# Patient Record
Sex: FEMALE | Race: WHITE | Employment: FULL TIME | URBAN - METROPOLITAN AREA
[De-identification: names, ages, dates, MRNs, and addresses within clinical notes are randomized per-mention and may not be internally consistent; named-entity substitution may affect disease eponyms.]

---

## 2017-01-31 RX ORDER — METFORMIN HYDROCHLORIDE 500 MG/1
1000 TABLET, EXTENDED RELEASE ORAL 2 TIMES DAILY WITH MEALS
Qty: 360 TABLET | Refills: 1 | Status: SHIPPED | OUTPATIENT
Start: 2017-01-31 | End: 2017-05-10

## 2017-02-03 ENCOUNTER — TELEPHONE (OUTPATIENT)
Dept: FAMILY MEDICINE CLINIC | Facility: CLINIC | Age: 57
End: 2017-02-03

## 2017-02-03 ENCOUNTER — LAB ENCOUNTER (OUTPATIENT)
Dept: LAB | Age: 57
End: 2017-02-03
Attending: INTERNAL MEDICINE
Payer: COMMERCIAL

## 2017-02-03 DIAGNOSIS — G47.30 SLEEP APNEA IN ADULT: Primary | ICD-10-CM

## 2017-02-03 DIAGNOSIS — G25.81 RESTLESS LEGS SYNDROME (RLS): ICD-10-CM

## 2017-02-03 DIAGNOSIS — G47.61 PERIODIC LIMB MOVEMENT DISORDER: Primary | ICD-10-CM

## 2017-02-03 LAB — DEPRECATED HBV CORE AB SER IA-ACNC: 10.2 NG/ML (ref 10–291)

## 2017-02-03 PROCEDURE — 82728 ASSAY OF FERRITIN: CPT

## 2017-02-03 PROCEDURE — 36415 COLL VENOUS BLD VENIPUNCTURE: CPT

## 2017-02-06 ENCOUNTER — MED REC SCAN ONLY (OUTPATIENT)
Dept: FAMILY MEDICINE CLINIC | Facility: CLINIC | Age: 57
End: 2017-02-06

## 2017-04-03 ENCOUNTER — TELEPHONE (OUTPATIENT)
Dept: FAMILY MEDICINE CLINIC | Facility: CLINIC | Age: 57
End: 2017-04-03

## 2017-04-03 DIAGNOSIS — Z00.00 ROUTINE HEALTH MAINTENANCE: Primary | ICD-10-CM

## 2017-04-03 DIAGNOSIS — E11.9 TYPE 2 DIABETES MELLITUS WITHOUT COMPLICATION, WITHOUT LONG-TERM CURRENT USE OF INSULIN (HCC): ICD-10-CM

## 2017-04-04 ENCOUNTER — TELEPHONE (OUTPATIENT)
Dept: FAMILY MEDICINE CLINIC | Facility: CLINIC | Age: 57
End: 2017-04-04

## 2017-04-10 ENCOUNTER — HOSPITAL ENCOUNTER (OUTPATIENT)
Dept: MAMMOGRAPHY | Age: 57
Discharge: HOME | End: 2017-04-10
Attending: FAMILY MEDICINE
Payer: COMMERCIAL

## 2017-04-10 DIAGNOSIS — Z00.00 ROUTINE HEALTH MAINTENANCE: ICD-10-CM

## 2017-04-10 PROCEDURE — 77067 SCR MAMMO BI INCL CAD: CPT

## 2017-04-14 ENCOUNTER — TELEPHONE (OUTPATIENT)
Dept: FAMILY MEDICINE CLINIC | Facility: CLINIC | Age: 57
End: 2017-04-14

## 2017-04-21 ENCOUNTER — TELEPHONE (OUTPATIENT)
Dept: FAMILY MEDICINE CLINIC | Facility: CLINIC | Age: 57
End: 2017-04-21

## 2017-04-24 ENCOUNTER — HOSPITAL ENCOUNTER (OUTPATIENT)
Dept: MAMMOGRAPHY | Age: 57
Discharge: HOME | End: 2017-04-24
Attending: FAMILY MEDICINE
Payer: COMMERCIAL

## 2017-04-24 DIAGNOSIS — R92.2 INCONCLUSIVE MAMMOGRAM: ICD-10-CM

## 2017-04-24 PROCEDURE — 77061 BREAST TOMOSYNTHESIS UNI: CPT

## 2017-04-24 PROCEDURE — 77065 DX MAMMO INCL CAD UNI: CPT

## 2017-04-25 ENCOUNTER — TELEPHONE (OUTPATIENT)
Dept: FAMILY MEDICINE CLINIC | Facility: CLINIC | Age: 57
End: 2017-04-25

## 2017-04-25 DIAGNOSIS — G25.81 RESTLESS LEG SYNDROME: ICD-10-CM

## 2017-04-25 DIAGNOSIS — G47.30 SLEEP APNEA IN ADULT: Primary | ICD-10-CM

## 2017-04-25 NOTE — TELEPHONE ENCOUNTER
Patient calls back, confirmed appointment for Saturday, states she needs a blood test for , sleep doctor. According to her note she wants a ferritin level, order placed.  V.O. Dr. Louise Carnes

## 2017-04-29 ENCOUNTER — NURSE ONLY (OUTPATIENT)
Dept: FAMILY MEDICINE CLINIC | Facility: CLINIC | Age: 57
End: 2017-04-29

## 2017-04-29 DIAGNOSIS — G25.81 RESTLESS LEG SYNDROME: ICD-10-CM

## 2017-04-29 DIAGNOSIS — E11.9 TYPE 2 DIABETES MELLITUS WITHOUT COMPLICATION, WITHOUT LONG-TERM CURRENT USE OF INSULIN (HCC): ICD-10-CM

## 2017-04-29 DIAGNOSIS — E78.00 PURE HYPERCHOLESTEROLEMIA: ICD-10-CM

## 2017-04-29 DIAGNOSIS — G47.30 SLEEP APNEA IN ADULT: ICD-10-CM

## 2017-04-29 PROCEDURE — 82728 ASSAY OF FERRITIN: CPT | Performed by: FAMILY MEDICINE

## 2017-04-29 PROCEDURE — 80061 LIPID PANEL: CPT | Performed by: FAMILY MEDICINE

## 2017-04-29 PROCEDURE — 36415 COLL VENOUS BLD VENIPUNCTURE: CPT | Performed by: FAMILY MEDICINE

## 2017-04-29 PROCEDURE — 82043 UR ALBUMIN QUANTITATIVE: CPT | Performed by: FAMILY MEDICINE

## 2017-04-29 PROCEDURE — 82570 ASSAY OF URINE CREATININE: CPT | Performed by: FAMILY MEDICINE

## 2017-04-29 PROCEDURE — 83036 HEMOGLOBIN GLYCOSYLATED A1C: CPT | Performed by: FAMILY MEDICINE

## 2017-05-01 ENCOUNTER — TELEPHONE (OUTPATIENT)
Dept: FAMILY MEDICINE CLINIC | Facility: CLINIC | Age: 57
End: 2017-05-01

## 2017-05-01 DIAGNOSIS — E11.9 TYPE 2 DIABETES MELLITUS WITHOUT COMPLICATION, WITHOUT LONG-TERM CURRENT USE OF INSULIN (HCC): Primary | ICD-10-CM

## 2017-05-01 NOTE — TELEPHONE ENCOUNTER
----- Message from Tin Alva DO sent at 4/30/2017  8:21 AM CDT -----  Can notify gifty, her BS control lipids all look good her kidneys are good as well .  Let's recall A1c in 6 months

## 2017-05-10 RX ORDER — METFORMIN HYDROCHLORIDE 500 MG/1
1000 TABLET, EXTENDED RELEASE ORAL 2 TIMES DAILY WITH MEALS
Qty: 360 TABLET | Refills: 1 | Status: SHIPPED | OUTPATIENT
Start: 2017-05-10 | End: 2017-10-22

## 2017-05-10 RX ORDER — LISINOPRIL 10 MG/1
10 TABLET ORAL
Qty: 90 TABLET | Refills: 1 | Status: SHIPPED | OUTPATIENT
Start: 2017-05-10 | End: 2017-10-22

## 2017-05-10 NOTE — TELEPHONE ENCOUNTER
Last OV 9/29/16, No future appointments.     Last rx lisinopril given 8/14/16  Last rx rosiglitazone given 8/31/16  Last rx metformin given 1/31/17

## 2017-08-02 ENCOUNTER — OFFICE VISIT (OUTPATIENT)
Dept: FAMILY MEDICINE CLINIC | Facility: CLINIC | Age: 57
End: 2017-08-02

## 2017-08-02 VITALS
WEIGHT: 151 LBS | SYSTOLIC BLOOD PRESSURE: 124 MMHG | DIASTOLIC BLOOD PRESSURE: 80 MMHG | TEMPERATURE: 98 F | HEART RATE: 68 BPM | RESPIRATION RATE: 16 BRPM | BODY MASS INDEX: 29 KG/M2

## 2017-08-02 DIAGNOSIS — E61.1 IRON DEFICIENCY: ICD-10-CM

## 2017-08-02 DIAGNOSIS — E11.9 TYPE 2 DIABETES MELLITUS WITHOUT COMPLICATION, WITHOUT LONG-TERM CURRENT USE OF INSULIN (HCC): ICD-10-CM

## 2017-08-02 DIAGNOSIS — R16.1 SPLENOMEGALY: ICD-10-CM

## 2017-08-02 DIAGNOSIS — R23.3 PETECHIAE: Primary | ICD-10-CM

## 2017-08-02 DIAGNOSIS — R63.5 WEIGHT GAIN: ICD-10-CM

## 2017-08-02 LAB
BASOPHILS # BLD AUTO: 0.04 X10(3) UL (ref 0–0.1)
BASOPHILS NFR BLD AUTO: 0.8 %
DEPRECATED HBV CORE AB SER IA-ACNC: 16.5 NG/ML (ref 10–291)
EOSINOPHIL # BLD AUTO: 0.37 X10(3) UL (ref 0–0.3)
EOSINOPHIL NFR BLD AUTO: 7 %
ERYTHROCYTE [DISTWIDTH] IN BLOOD BY AUTOMATED COUNT: 14 % (ref 11.5–16)
HCT VFR BLD AUTO: 32.4 % (ref 34–50)
HGB BLD-MCNC: 10.4 G/DL (ref 12–16)
IMMATURE GRANULOCYTE COUNT: 0.01 X10(3) UL (ref 0–1)
IMMATURE GRANULOCYTE RATIO %: 0.2 %
IRON SATURATION: 10 % (ref 13–45)
IRON: 42 UG/DL (ref 28–170)
LYMPHOCYTES # BLD AUTO: 1.84 X10(3) UL (ref 0.9–4)
LYMPHOCYTES NFR BLD AUTO: 34.9 %
MCH RBC QN AUTO: 30.9 PG (ref 27–33.2)
MCHC RBC AUTO-ENTMCNC: 32.1 G/DL (ref 31–37)
MCV RBC AUTO: 96.1 FL (ref 81–100)
MONOCYTES # BLD AUTO: 0.56 X10(3) UL (ref 0.1–0.6)
MONOCYTES NFR BLD AUTO: 10.6 %
NEUTROPHIL ABS PRELIM: 2.45 X10 (3) UL (ref 1.3–6.7)
NEUTROPHILS # BLD AUTO: 2.45 X10(3) UL (ref 1.3–6.7)
NEUTROPHILS NFR BLD AUTO: 46.5 %
PLATELET # BLD AUTO: 347 10(3)UL (ref 150–450)
RBC # BLD AUTO: 3.37 X10(6)UL (ref 3.8–5.1)
RED CELL DISTRIBUTION WIDTH-SD: 49 FL (ref 35.1–46.3)
TOTAL IRON BINDING CAPACITY: 425 UG/DL (ref 298–536)
TRANSFERRIN: 285 MG/DL (ref 200–360)
WBC # BLD AUTO: 5.3 X10(3) UL (ref 4–13)

## 2017-08-02 PROCEDURE — 83540 ASSAY OF IRON: CPT | Performed by: FAMILY MEDICINE

## 2017-08-02 PROCEDURE — 85025 COMPLETE CBC W/AUTO DIFF WBC: CPT | Performed by: FAMILY MEDICINE

## 2017-08-02 PROCEDURE — 99214 OFFICE O/P EST MOD 30 MIN: CPT | Performed by: FAMILY MEDICINE

## 2017-08-02 PROCEDURE — 82728 ASSAY OF FERRITIN: CPT | Performed by: FAMILY MEDICINE

## 2017-08-02 PROCEDURE — 83550 IRON BINDING TEST: CPT | Performed by: FAMILY MEDICINE

## 2017-08-02 PROCEDURE — 83036 HEMOGLOBIN GLYCOSYLATED A1C: CPT | Performed by: FAMILY MEDICINE

## 2017-08-03 ENCOUNTER — TELEPHONE (OUTPATIENT)
Dept: FAMILY MEDICINE CLINIC | Facility: CLINIC | Age: 57
End: 2017-08-03

## 2017-08-03 DIAGNOSIS — D50.9 IRON DEFICIENCY ANEMIA, UNSPECIFIED IRON DEFICIENCY ANEMIA TYPE: Primary | ICD-10-CM

## 2017-08-03 LAB
EST. AVERAGE GLUCOSE BLD GHB EST-MCNC: 146 MG/DL (ref 68–126)
HBA1C MFR BLD HPLC: 6.7 % (ref ?–5.7)

## 2017-08-03 RX ORDER — FERROUS SULFATE 325(65) MG
TABLET ORAL
Refills: 3 | COMMUNITY
Start: 2017-05-10 | End: 2017-08-26

## 2017-08-03 NOTE — PROGRESS NOTES
David Carrero is a 59year old female. HPI:   Jona Keenan presents today for a number of issues, first she noted new onset of several small red spots no her arms and legs, they are at times bigger than other. They are not painful nor do they itch.  She has Past Medical History:   Diagnosis Date   • DM type 2 (diabetes mellitus, type 2) [facility]    • Esophageal reflux    • Hyperlipidemia    • Hypertension    • Insomnia    • Varicose veins       Social History:  Smoking status: Never Smoker NUTRITION MANAGEMENT 3 HRS  US ABDOMEN COMPLETE (CPT=76700)     The patient indicates understanding of these issues and agrees to the plan. The patient is asked to return in after we get her lab results .   Not convinced these are petechiae, she needs some

## 2017-08-03 NOTE — TELEPHONE ENCOUNTER
Detailed message left for pt on cell (ok per consent) with Dr. Willa Damon an instructions to call with questions/concerns.

## 2017-08-03 NOTE — TELEPHONE ENCOUNTER
----- Message from Bismark Bond DO sent at 8/3/2017  6:54 AM CDT -----  Can notify Nora Acuña, her iron stores have actually improved, but she is still anemic.  I think I'd like her to see a hematologist, Dr. Sita Desir at [facility]  I will place the referral and see if

## 2017-08-08 ENCOUNTER — HOSPITAL ENCOUNTER (OUTPATIENT)
Dept: ULTRASOUND IMAGING | Age: 57
Discharge: HOME | End: 2017-08-08
Attending: FAMILY MEDICINE
Payer: COMMERCIAL

## 2017-08-08 DIAGNOSIS — R23.3 PETECHIAE: ICD-10-CM

## 2017-08-08 PROCEDURE — 76700 US EXAM ABDOM COMPLETE: CPT | Performed by: FAMILY MEDICINE

## 2017-08-11 ENCOUNTER — TELEPHONE (OUTPATIENT)
Dept: FAMILY MEDICINE CLINIC | Facility: CLINIC | Age: 57
End: 2017-08-11

## 2017-08-11 DIAGNOSIS — R10.11 RIGHT UPPER QUADRANT ABDOMINAL PAIN: Primary | ICD-10-CM

## 2017-08-11 DIAGNOSIS — R11.0 NAUSEA: ICD-10-CM

## 2017-08-11 DIAGNOSIS — K83.8 COMMON BILE DUCT DILATION: ICD-10-CM

## 2017-08-18 ENCOUNTER — OFFICE VISIT (OUTPATIENT)
Dept: HEMATOLOGY/ONCOLOGY | Age: 57
End: 2017-08-18
Attending: INTERNAL MEDICINE
Payer: COMMERCIAL

## 2017-08-18 ENCOUNTER — TELEPHONE (OUTPATIENT)
Dept: FAMILY MEDICINE CLINIC | Facility: CLINIC | Age: 57
End: 2017-08-18

## 2017-08-18 VITALS
RESPIRATION RATE: 18 BRPM | SYSTOLIC BLOOD PRESSURE: 121 MMHG | DIASTOLIC BLOOD PRESSURE: 77 MMHG | WEIGHT: 149.63 LBS | OXYGEN SATURATION: 99 % | HEART RATE: 76 BPM | BODY MASS INDEX: 29 KG/M2 | TEMPERATURE: 96 F

## 2017-08-18 DIAGNOSIS — D50.9 IRON DEFICIENCY ANEMIA, UNSPECIFIED IRON DEFICIENCY ANEMIA TYPE: Primary | ICD-10-CM

## 2017-08-18 DIAGNOSIS — E61.1 IRON DEFICIENCY: Primary | ICD-10-CM

## 2017-08-18 DIAGNOSIS — D50.9 IRON DEFICIENCY ANEMIA, UNSPECIFIED IRON DEFICIENCY ANEMIA TYPE: ICD-10-CM

## 2017-08-18 DIAGNOSIS — D64.9 NORMOCYTIC ANEMIA: ICD-10-CM

## 2017-08-18 PROCEDURE — 82607 VITAMIN B-12: CPT | Performed by: INTERNAL MEDICINE

## 2017-08-18 PROCEDURE — 85025 COMPLETE CBC W/AUTO DIFF WBC: CPT | Performed by: INTERNAL MEDICINE

## 2017-08-18 PROCEDURE — 99211 OFF/OP EST MAY X REQ PHY/QHP: CPT

## 2017-08-18 PROCEDURE — 36415 COLL VENOUS BLD VENIPUNCTURE: CPT

## 2017-08-18 PROCEDURE — 82746 ASSAY OF FOLIC ACID SERUM: CPT | Performed by: INTERNAL MEDICINE

## 2017-08-18 PROCEDURE — 82728 ASSAY OF FERRITIN: CPT | Performed by: INTERNAL MEDICINE

## 2017-08-18 PROCEDURE — 83615 LACTATE (LD) (LDH) ENZYME: CPT | Performed by: INTERNAL MEDICINE

## 2017-08-18 RX ORDER — ASCORBIC ACID 500 MG
500 TABLET ORAL DAILY
COMMUNITY

## 2017-08-18 NOTE — TELEPHONE ENCOUNTER
Spouse called, needs a referral for pt's 11/17 appt with Dr. Emerita Valentino.   Please call spouse at [phone]

## 2017-08-24 ENCOUNTER — DIABETIC EDUCATION (OUTPATIENT)
Dept: ENDOCRINOLOGY CLINIC | Facility: CLINIC | Age: 57
End: 2017-08-24

## 2017-08-24 DIAGNOSIS — E11.9 TYPE 2 DIABETES MELLITUS WITHOUT COMPLICATION, WITHOUT LONG-TERM CURRENT USE OF INSULIN (HCC): Primary | ICD-10-CM

## 2017-08-24 PROCEDURE — 97802 MEDICAL NUTRITION INDIV IN: CPT | Performed by: DIETITIAN, REGISTERED

## 2017-08-24 NOTE — PROGRESS NOTES
Start time: 2:50 End time: 4:05    Pt is eating healthy; low starch needs to add protein at breakfast. Not a good meat eater, anemic. Eats plenty of veg and some fruit. Had DB educ ~10 years ago at  at [facility].  Reviewed balanced plate and carb counting/fo

## 2017-08-26 RX ORDER — FERROUS SULFATE 325(65) MG
325 TABLET ORAL 2 TIMES DAILY
Qty: 180 TABLET | Refills: 1 | Status: SHIPPED | OUTPATIENT
Start: 2017-08-26 | End: 2018-05-14

## 2017-08-26 NOTE — TELEPHONE ENCOUNTER
Last OV 8/2/17, Future Appointments  Date Time Provider Pam Littlejohn   9/21/2017 3:50 PM MD Mony Farfan   11/17/2017 3:45 PM Osmany Gatica MD  [facility] [place]       Last rx given 5/10/17

## 2017-08-26 NOTE — TELEPHONE ENCOUNTER
Spouse called, pt needs a refill on Ferrous Sulfate 325 (65 Fe) MG Oral Tab. [facility], [facility]. Pt is suppose to take 2 by mouth everyday. This was prescribed by Dr. Betty Monroe. Please call spouse at [phone].   Dr. Roxine Cushing changed the dose from 1

## 2017-09-18 RX ORDER — OMEPRAZOLE 20 MG/1
CAPSULE, DELAYED RELEASE ORAL
Qty: 180 CAPSULE | Refills: 3 | Status: SHIPPED | OUTPATIENT
Start: 2017-09-18 | End: 2018-09-15

## 2017-09-18 NOTE — TELEPHONE ENCOUNTER
omeprazole 20 MG Oral Capsule Delayed Release 180 capsule 2 11/16/2016     Sig: TAKE 1 CAPSULE TWICE DAILY      PLEASE SEND [facility]RU [facility] MAIL ORDER.

## 2017-09-18 NOTE — TELEPHONE ENCOUNTER
Last OV 8/2/17, Future Appointments  Date Time Provider Pam Littlejohn   9/21/2017 3:50 PM MD Min Maldonado   11/17/2017 3:45 PM Jason Garner MD  [facility] [place]       Last rx given 11/16/16

## 2017-10-03 ENCOUNTER — TELEPHONE (OUTPATIENT)
Dept: FAMILY MEDICINE CLINIC | Facility: CLINIC | Age: 57
End: 2017-10-03

## 2017-10-03 DIAGNOSIS — E61.1 IRON DEFICIENCY: Primary | ICD-10-CM

## 2017-10-03 DIAGNOSIS — R10.12 LUQ PAIN: ICD-10-CM

## 2017-10-03 NOTE — TELEPHONE ENCOUNTER
Pt is scheduled to have EGD and colonoscopy with Dr. Damián Gallagher. Pt will need referral for follow up after testing.

## 2017-10-21 NOTE — TELEPHONE ENCOUNTER
Last OV 8/2/17, Future Appointments  Date Time Provider Pam Annetta   10/24/2017 11:00 AM Wesley Figueredo MD [facility]   11/17/2017 3:45 PM Kalie Hyde MD [facility] [place]       Last rx rosiglitazone given 5/10/17  Last rx lisinopr

## 2017-10-21 NOTE — TELEPHONE ENCOUNTER
SPOUSE CALLED AND PT NEEDS ALL 3 MEDS REFILLED THROUGH [facility]      Rosiglitazone Maleate 4 MG Oral Tab    lisinopril 10 MG Oral Tab    MetFORMIN HCl  MG Oral Tablet 24 Hr    PLEASE SEND SCRIPTS TO [facility]    THANK YOU

## 2017-10-22 RX ORDER — METFORMIN HYDROCHLORIDE 500 MG/1
1000 TABLET, EXTENDED RELEASE ORAL 2 TIMES DAILY WITH MEALS
Qty: 360 TABLET | Refills: 3 | Status: SHIPPED | OUTPATIENT
Start: 2017-10-22 | End: 2018-10-08

## 2017-10-22 RX ORDER — LISINOPRIL 10 MG/1
10 TABLET ORAL
Qty: 90 TABLET | Refills: 3 | Status: SHIPPED | OUTPATIENT
Start: 2017-10-22 | End: 2018-09-17

## 2017-10-23 ENCOUNTER — TELEPHONE (OUTPATIENT)
Dept: FAMILY MEDICINE CLINIC | Facility: CLINIC | Age: 57
End: 2017-10-23

## 2017-10-23 NOTE — TELEPHONE ENCOUNTER
Last OV 8/2/17, Future Appointments  Date Time Provider Pam Annetta   10/24/2017 11:00 AM [facility]   11/17/2017 3:45 PM Osmany Gatica MD [facility] [place]       Last rx given 3/29/16

## 2017-10-23 NOTE — TELEPHONE ENCOUNTER
Allergies on pt's file reviewed with pt. Pt states she does not have an allergy to statins (she has never been prescribed a statin in the past); allergy removed from chart. Pt states she had a bad reaction to Amaryl in the past. Allergy added.   Pt will alcides

## 2017-10-24 PROCEDURE — 88305 TISSUE EXAM BY PATHOLOGIST: CPT | Performed by: INTERNAL MEDICINE

## 2017-10-25 ENCOUNTER — TELEPHONE (OUTPATIENT)
Dept: FAMILY MEDICINE CLINIC | Facility: CLINIC | Age: 57
End: 2017-10-25

## 2017-10-25 NOTE — TELEPHONE ENCOUNTER
Noted    Future Appointments  Date Time Provider Pam Littlejohn   11/10/2017 9:00 AM EM[facility] CH[facility] NURSE [facility] EMG Jorge Collado   11/17/2017 3:45 PM Steven Paulson MD [facility] [place]

## 2017-10-30 PROBLEM — B96.81 HELICOBACTER POSITIVE GASTRITIS: Status: ACTIVE | Noted: 2017-10-30

## 2017-10-30 PROBLEM — K29.70 HELICOBACTER POSITIVE GASTRITIS: Status: ACTIVE | Noted: 2017-10-30

## 2017-11-17 ENCOUNTER — APPOINTMENT (OUTPATIENT)
Dept: HEMATOLOGY/ONCOLOGY | Age: 57
End: 2017-11-17
Attending: INTERNAL MEDICINE
Payer: COMMERCIAL

## 2017-11-20 ENCOUNTER — TELEPHONE (OUTPATIENT)
Dept: FAMILY MEDICINE CLINIC | Facility: CLINIC | Age: 57
End: 2017-11-20

## 2017-11-20 RX ORDER — FLUCONAZOLE 100 MG/1
100 TABLET ORAL DAILY
Qty: 5 TABLET | Refills: 0 | Status: SHIPPED | OUTPATIENT
Start: 2017-11-20 | End: 2017-11-25

## 2017-11-20 NOTE — TELEPHONE ENCOUNTER
Pt called, recently on an antibiotic, now thinks she is getting a yeast infection. Leaving for out of town tomorrow. Is there anything we can call in for her today? [place].    Please call pt at [phone]

## 2018-01-19 ENCOUNTER — TELEPHONE (OUTPATIENT)
Dept: FAMILY MEDICINE CLINIC | Facility: CLINIC | Age: 58
End: 2018-01-19

## 2018-01-19 NOTE — TELEPHONE ENCOUNTER
Pt state she was feeling very good prior to meeting with Dr. Lady Crouch. Dr. Lady Crouch states that she has to be off omeprazole for 2 weeks prior to stool testing.     Pt states she went off it last Friday- starting Sunday she started having a lot of pain, pt is not

## 2018-01-19 NOTE — TELEPHONE ENCOUNTER
PT is seeing Dr. Jacey Laird and they asked her to go off of her    omeprazole 20 MG Oral Capsule Delayed Release      for two week so they can do a stool sample . It has been one week and pt says she is in so much pain. She can't eatand has to sleep sitting up.

## 2018-01-19 NOTE — TELEPHONE ENCOUNTER
Well she  Could try some mylanta?  Or tums, but I would check with Dr. Lois Bedoya first and make sure it isn;t going to mess anything up as far as her testing goes

## 2018-01-19 NOTE — TELEPHONE ENCOUNTER
Pt advised of medication and to check with Dr. Corinne Elkins. Number for Dr. Corinne Elkins provided.

## 2018-01-29 ENCOUNTER — OFFICE VISIT (OUTPATIENT)
Dept: FAMILY MEDICINE CLINIC | Facility: CLINIC | Age: 58
End: 2018-01-29

## 2018-01-29 ENCOUNTER — LAB ENCOUNTER (OUTPATIENT)
Dept: LAB | Age: 58
End: 2018-01-29
Attending: INTERNAL MEDICINE
Payer: COMMERCIAL

## 2018-01-29 VITALS
TEMPERATURE: 98 F | WEIGHT: 146 LBS | DIASTOLIC BLOOD PRESSURE: 70 MMHG | BODY MASS INDEX: 29 KG/M2 | HEART RATE: 70 BPM | SYSTOLIC BLOOD PRESSURE: 120 MMHG

## 2018-01-29 DIAGNOSIS — R20.2 PARESTHESIA AND PAIN OF BOTH UPPER EXTREMITIES: Primary | ICD-10-CM

## 2018-01-29 DIAGNOSIS — K29.70 HELICOBACTER POSITIVE GASTRITIS: ICD-10-CM

## 2018-01-29 DIAGNOSIS — B96.81 HELICOBACTER POSITIVE GASTRITIS: ICD-10-CM

## 2018-01-29 DIAGNOSIS — I10 ESSENTIAL HYPERTENSION: ICD-10-CM

## 2018-01-29 DIAGNOSIS — M79.601 PARESTHESIA AND PAIN OF BOTH UPPER EXTREMITIES: Primary | ICD-10-CM

## 2018-01-29 DIAGNOSIS — E61.1 IRON DEFICIENCY: ICD-10-CM

## 2018-01-29 DIAGNOSIS — E78.00 PURE HYPERCHOLESTEROLEMIA: ICD-10-CM

## 2018-01-29 DIAGNOSIS — M79.602 PARESTHESIA AND PAIN OF BOTH UPPER EXTREMITIES: Primary | ICD-10-CM

## 2018-01-29 DIAGNOSIS — E11.9 TYPE 2 DIABETES MELLITUS WITHOUT COMPLICATION, WITHOUT LONG-TERM CURRENT USE OF INSULIN (HCC): ICD-10-CM

## 2018-01-29 DIAGNOSIS — G56.03 BILATERAL CARPAL TUNNEL SYNDROME: ICD-10-CM

## 2018-01-29 PROCEDURE — 84439 ASSAY OF FREE THYROXINE: CPT | Performed by: FAMILY MEDICINE

## 2018-01-29 PROCEDURE — 87338 HPYLORI STOOL AG IA: CPT

## 2018-01-29 PROCEDURE — 82570 ASSAY OF URINE CREATININE: CPT | Performed by: FAMILY MEDICINE

## 2018-01-29 PROCEDURE — 83036 HEMOGLOBIN GLYCOSYLATED A1C: CPT | Performed by: FAMILY MEDICINE

## 2018-01-29 PROCEDURE — 80061 LIPID PANEL: CPT | Performed by: FAMILY MEDICINE

## 2018-01-29 PROCEDURE — 84443 ASSAY THYROID STIM HORMONE: CPT | Performed by: FAMILY MEDICINE

## 2018-01-29 PROCEDURE — 82043 UR ALBUMIN QUANTITATIVE: CPT | Performed by: FAMILY MEDICINE

## 2018-01-29 PROCEDURE — 99214 OFFICE O/P EST MOD 30 MIN: CPT | Performed by: FAMILY MEDICINE

## 2018-01-29 PROCEDURE — 85025 COMPLETE CBC W/AUTO DIFF WBC: CPT | Performed by: INTERNAL MEDICINE

## 2018-01-29 PROCEDURE — 82728 ASSAY OF FERRITIN: CPT | Performed by: INTERNAL MEDICINE

## 2018-01-29 NOTE — PROGRESS NOTES
HPI:   Mabel Abreu is a 59year old female who presents for recheck of her diabetes. Patient’s FBS have been . Last visit with ophthalmologist was 4/13/17. Pt has been checking her feet on a regular basis. Pt denies any tingling of the feet. (ONETOUCH ULTRA BLUE) In Vitro Strip TEST BLOOD SUGAR THREE TIMES A DAY Disp: 300 strip Rfl: 3   MetFORMIN HCl  MG Oral Tablet 24 Hr Take 2 tablets (1,000 mg total) by mouth 2 (two) times daily with meals.  Disp: 360 tablet Rfl: 3   lisinopril 10 MG O exertion  CARDIOVASCULAR: denies chest pain on exertion  GI: denies abdominal pain and denies heartburn  NEURO: denies headaches, paresthesia in her hands     EXAM:   /70   Pulse 70   Temp 98.4 °F (36.9 °C) (Temporal)   Wt 146 lb   BMI 28.51 kg/m²

## 2018-01-30 LAB
BASOPHILS # BLD AUTO: 0.05 X10(3) UL (ref 0–0.1)
BASOPHILS NFR BLD AUTO: 0.9 %
CHOLEST SMN-MCNC: 161 MG/DL (ref ?–200)
CREAT UR-SCNC: 66.9 MG/DL
DEPRECATED HBV CORE AB SER IA-ACNC: 46.4 NG/ML (ref 10–291)
EOSINOPHIL # BLD AUTO: 0.3 X10(3) UL (ref 0–0.3)
EOSINOPHIL NFR BLD AUTO: 5.3 %
ERYTHROCYTE [DISTWIDTH] IN BLOOD BY AUTOMATED COUNT: 13.5 % (ref 11.5–16)
EST. AVERAGE GLUCOSE BLD GHB EST-MCNC: 151 MG/DL (ref 68–126)
FREE T4: 0.8 NG/DL (ref 0.9–1.8)
HBA1C MFR BLD HPLC: 6.9 % (ref ?–5.7)
HCT VFR BLD AUTO: 35.1 % (ref 34–50)
HDLC SERPL-MCNC: 63 MG/DL (ref 45–?)
HDLC SERPL: 2.56 {RATIO} (ref ?–4.44)
HGB BLD-MCNC: 11.1 G/DL (ref 12–16)
IMMATURE GRANULOCYTE COUNT: 0.01 X10(3) UL (ref 0–1)
IMMATURE GRANULOCYTE RATIO %: 0.2 %
LDLC SERPL CALC-MCNC: 71 MG/DL (ref ?–130)
LYMPHOCYTES # BLD AUTO: 2.51 X10(3) UL (ref 0.9–4)
LYMPHOCYTES NFR BLD AUTO: 44.6 %
MCH RBC QN AUTO: 31.1 PG (ref 27–33.2)
MCHC RBC AUTO-ENTMCNC: 31.6 G/DL (ref 31–37)
MCV RBC AUTO: 98.3 FL (ref 81–100)
MICROALBUMIN UR-MCNC: <0.5 MG/DL
MONOCYTES # BLD AUTO: 0.59 X10(3) UL (ref 0.1–0.6)
MONOCYTES NFR BLD AUTO: 10.5 %
NEUTROPHIL ABS PRELIM: 2.17 X10 (3) UL (ref 1.3–6.7)
NEUTROPHILS # BLD AUTO: 2.17 X10(3) UL (ref 1.3–6.7)
NEUTROPHILS NFR BLD AUTO: 38.5 %
NONHDLC SERPL-MCNC: 98 MG/DL (ref ?–130)
PLATELET # BLD AUTO: 314 10(3)UL (ref 150–450)
RBC # BLD AUTO: 3.57 X10(6)UL (ref 3.8–5.1)
RED CELL DISTRIBUTION WIDTH-SD: 48.8 FL (ref 35.1–46.3)
TRIGL SERPL-MCNC: 136 MG/DL (ref ?–150)
TSI SER-ACNC: 4.49 MIU/ML (ref 0.35–5.5)
VLDLC SERPL CALC-MCNC: 27 MG/DL (ref 5–40)
WBC # BLD AUTO: 5.6 X10(3) UL (ref 4–13)

## 2018-01-31 ENCOUNTER — TELEPHONE (OUTPATIENT)
Dept: FAMILY MEDICINE CLINIC | Facility: CLINIC | Age: 58
End: 2018-01-31

## 2018-01-31 DIAGNOSIS — E11.9 TYPE 2 DIABETES MELLITUS WITHOUT COMPLICATION, WITHOUT LONG-TERM CURRENT USE OF INSULIN (HCC): Primary | ICD-10-CM

## 2018-01-31 LAB — HELICOBACTER PYLORI AG, FECAL: NEGATIVE

## 2018-01-31 NOTE — PROGRESS NOTES
Here are your results from your recent visit with Gastroenterology. You will be happy to know that the stool test for the bacteria called H.pylori returned NEGATIVE. This is very good news.       If you need any further assistance, please feel free t

## 2018-01-31 NOTE — TELEPHONE ENCOUNTER
----- Message from Bismark Bond DO sent at 1/31/2018  4:12 PM CST -----  Notified of results and her A1c went up, but she admits that she has snot been eating well and has not been exercising as much and wants to try and make a better attempt to get some

## 2018-03-02 ENCOUNTER — TELEPHONE (OUTPATIENT)
Dept: FAMILY MEDICINE CLINIC | Facility: CLINIC | Age: 58
End: 2018-03-02

## 2018-03-02 NOTE — TELEPHONE ENCOUNTER
Pt called, she has an eye appt with Dr. Angela Baldwin on 04/23 at 8:00 am and needs a referral.  Please call pt at [phone]

## 2018-03-26 DIAGNOSIS — J30.2 SEASONAL ALLERGIC RHINITIS, UNSPECIFIED TRIGGER: ICD-10-CM

## 2018-03-26 RX ORDER — ALBUTEROL SULFATE 90 UG/1
2 AEROSOL, METERED RESPIRATORY (INHALATION) EVERY 4 HOURS PRN
Qty: 3 INHALER | Refills: 3 | Status: SHIPPED | OUTPATIENT
Start: 2018-03-26 | End: 2019-07-19

## 2018-03-26 NOTE — TELEPHONE ENCOUNTER
Needs refill of inhaler, it is now . Leaving town on [place] and wants to have with.  Shira Phillips

## 2018-03-27 ENCOUNTER — OFFICE VISIT (OUTPATIENT)
Dept: FAMILY MEDICINE CLINIC | Facility: CLINIC | Age: 58
End: 2018-03-27

## 2018-03-27 VITALS
WEIGHT: 149 LBS | BODY MASS INDEX: 29.25 KG/M2 | TEMPERATURE: 98 F | OXYGEN SATURATION: 99 % | RESPIRATION RATE: 16 BRPM | SYSTOLIC BLOOD PRESSURE: 120 MMHG | HEART RATE: 71 BPM | HEIGHT: 60 IN | DIASTOLIC BLOOD PRESSURE: 60 MMHG

## 2018-03-27 DIAGNOSIS — E11.9 CONTROLLED TYPE 2 DIABETES MELLITUS WITHOUT COMPLICATION, WITHOUT LONG-TERM CURRENT USE OF INSULIN (HCC): Primary | ICD-10-CM

## 2018-03-27 DIAGNOSIS — J30.89 NON-SEASONAL ALLERGIC RHINITIS DUE TO OTHER ALLERGIC TRIGGER: Primary | ICD-10-CM

## 2018-03-27 DIAGNOSIS — R06.00 DYSPNEA ON EXERTION: ICD-10-CM

## 2018-03-27 DIAGNOSIS — J98.01 BRONCHOSPASM: ICD-10-CM

## 2018-03-27 PROCEDURE — 99214 OFFICE O/P EST MOD 30 MIN: CPT | Performed by: FAMILY MEDICINE

## 2018-03-27 RX ORDER — METHYLPREDNISOLONE 4 MG/1
TABLET ORAL
Qty: 1 KIT | Refills: 0 | Status: SHIPPED | OUTPATIENT
Start: 2018-03-27 | End: 2018-06-27 | Stop reason: ALTCHOICE

## 2018-03-27 NOTE — PROGRESS NOTES
HPI:   Lis Shah is a 59year old female who presents for upper respiratory symptoms for  1  weeks.  Patient reports sore throat, congestion, clear colored nasal discharge, cough is keeping pt up at night, wheezing., has been using her inhaler off mellitus, type 2) (HCC)    • Esophageal reflux    • H. pylori infection    • Hyperlipidemia    • Hypertension    • Insomnia    • Varicose veins       Past Surgical History:  No date: CHOLECYSTECTOMY  No date: HYSTERECTOMY  No date: OTHER SURGICAL HISTORY days  Call if Sx worsen or persist    Meds & Refills for this Visit:  Signed Prescriptions Disp Refills    methylPREDNISolone (MEDROL) 4 MG Oral Tablet Therapy Pack 1 kit 0      Sig: As directed.            Imaging & Consults:  None

## 2018-04-24 ENCOUNTER — MED REC SCAN ONLY (OUTPATIENT)
Dept: FAMILY MEDICINE CLINIC | Facility: CLINIC | Age: 58
End: 2018-04-24

## 2018-04-26 ENCOUNTER — TELEPHONE (OUTPATIENT)
Dept: FAMILY MEDICINE CLINIC | Facility: CLINIC | Age: 58
End: 2018-04-26

## 2018-05-14 RX ORDER — FERROUS SULFATE 325(65) MG
325 TABLET ORAL 2 TIMES DAILY
Qty: 180 TABLET | Refills: 1 | Status: SHIPPED | OUTPATIENT
Start: 2018-05-14 | End: 2019-07-30

## 2018-05-14 NOTE — TELEPHONE ENCOUNTER
LOV: 3/27/18    Last Refill:  8/26/17  #180 1 RF    Future Appointments  Date Time Provider Pam Littlejohn   5/17/2018 8:30 AM  [facility] EMG Aidan Vásquez

## 2018-05-17 ENCOUNTER — NURSE ONLY (OUTPATIENT)
Dept: FAMILY MEDICINE CLINIC | Facility: CLINIC | Age: 58
End: 2018-05-17

## 2018-05-17 DIAGNOSIS — E61.1 IRON DEFICIENCY: Primary | ICD-10-CM

## 2018-05-17 DIAGNOSIS — E11.9 CONTROLLED TYPE 2 DIABETES MELLITUS WITHOUT COMPLICATION, WITHOUT LONG-TERM CURRENT USE OF INSULIN (HCC): ICD-10-CM

## 2018-05-17 DIAGNOSIS — E11.9 TYPE 2 DIABETES MELLITUS WITHOUT COMPLICATION, WITHOUT LONG-TERM CURRENT USE OF INSULIN (HCC): ICD-10-CM

## 2018-05-17 PROCEDURE — 82728 ASSAY OF FERRITIN: CPT | Performed by: FAMILY MEDICINE

## 2018-05-17 PROCEDURE — 83036 HEMOGLOBIN GLYCOSYLATED A1C: CPT | Performed by: FAMILY MEDICINE

## 2018-05-17 PROCEDURE — 85025 COMPLETE CBC W/AUTO DIFF WBC: CPT | Performed by: FAMILY MEDICINE

## 2018-05-17 PROCEDURE — 82570 ASSAY OF URINE CREATININE: CPT | Performed by: FAMILY MEDICINE

## 2018-05-17 PROCEDURE — 36415 COLL VENOUS BLD VENIPUNCTURE: CPT | Performed by: FAMILY MEDICINE

## 2018-05-17 PROCEDURE — 82043 UR ALBUMIN QUANTITATIVE: CPT | Performed by: FAMILY MEDICINE

## 2018-05-17 NOTE — PROGRESS NOTES
Patient to clinic for A1c and Microalbumin    Patient asking to have her iron checked as she takes an iron pill daily    Per v/o Dr Ivonne Bryson, add ferritin and CBC    Labs ordered    Mint and lavender tube drawn right AC x 1 attempt  Urine sent in red top tube

## 2018-05-18 ENCOUNTER — TELEPHONE (OUTPATIENT)
Dept: FAMILY MEDICINE CLINIC | Facility: CLINIC | Age: 58
End: 2018-05-18

## 2018-05-18 DIAGNOSIS — E11.9 TYPE 2 DIABETES MELLITUS WITHOUT COMPLICATION, WITHOUT LONG-TERM CURRENT USE OF INSULIN (HCC): ICD-10-CM

## 2018-05-18 DIAGNOSIS — E78.00 PURE HYPERCHOLESTEROLEMIA: Primary | ICD-10-CM

## 2018-05-18 NOTE — TELEPHONE ENCOUNTER
----- Message from Bismark Bond DO sent at 5/18/2018  8:13 AM CDT -----  Can notify Nora Acuña that her labs overall look good, but her A1c is about the same as it was, and it's not terrible but something we need to keep an eye on, lets recall A1c in 6 months

## 2018-06-27 ENCOUNTER — OFFICE VISIT (OUTPATIENT)
Dept: FAMILY MEDICINE CLINIC | Facility: CLINIC | Age: 58
End: 2018-06-27

## 2018-06-27 VITALS
WEIGHT: 152 LBS | OXYGEN SATURATION: 99 % | RESPIRATION RATE: 16 BRPM | DIASTOLIC BLOOD PRESSURE: 82 MMHG | TEMPERATURE: 98 F | SYSTOLIC BLOOD PRESSURE: 112 MMHG | BODY MASS INDEX: 30 KG/M2 | HEART RATE: 72 BPM

## 2018-06-27 DIAGNOSIS — J98.01 BRONCHOSPASM: ICD-10-CM

## 2018-06-27 DIAGNOSIS — J06.9 VIRAL URI: Primary | ICD-10-CM

## 2018-06-27 DIAGNOSIS — R05.9 COUGH: ICD-10-CM

## 2018-06-27 PROCEDURE — 99213 OFFICE O/P EST LOW 20 MIN: CPT | Performed by: FAMILY MEDICINE

## 2018-06-27 NOTE — PROGRESS NOTES
Angelica Carter is a 59year old female.   HPI:   Royal Layton is here for discussion of her allergies, sh has some PND and also some back and muscle pain , no fever slightly increased cough, and her inhaler helped, no other sick contacts, but [facility] Social History:  Smoking status: Never Smoker                                                              Smokeless tobacco: Never Used                      Alcohol use:  No                 REVIEW OF SYSTEMS:   GENERAL HEALTH: feels well otherwise  SKIN:

## 2018-09-15 RX ORDER — OMEPRAZOLE 20 MG/1
CAPSULE, DELAYED RELEASE ORAL
Qty: 180 CAPSULE | Refills: 3 | Status: SHIPPED | OUTPATIENT
Start: 2018-09-15 | End: 2019-06-27

## 2018-10-09 RX ORDER — METFORMIN HYDROCHLORIDE 500 MG/1
TABLET, EXTENDED RELEASE ORAL
Qty: 360 TABLET | Refills: 3 | Status: SHIPPED | OUTPATIENT
Start: 2018-10-09 | End: 2019-11-05

## 2018-10-09 NOTE — TELEPHONE ENCOUNTER
LOV: 6/27/18  Last Refill:10/22/17 #360 3 RF  Next A1C in November  A1C in May 6.9    Future Appointments   Date Time Provider Pam Littlejohn   10/11/2018  4:15 PM Soni Ellis [facility] ZARA Rdz

## 2018-10-11 ENCOUNTER — OFFICE VISIT (OUTPATIENT)
Dept: FAMILY MEDICINE CLINIC | Facility: CLINIC | Age: 58
End: 2018-10-11

## 2018-10-11 VITALS
HEART RATE: 76 BPM | TEMPERATURE: 97 F | WEIGHT: 147 LBS | DIASTOLIC BLOOD PRESSURE: 76 MMHG | SYSTOLIC BLOOD PRESSURE: 120 MMHG | BODY MASS INDEX: 29 KG/M2 | RESPIRATION RATE: 18 BRPM

## 2018-10-11 DIAGNOSIS — D50.9 IRON DEFICIENCY ANEMIA, UNSPECIFIED IRON DEFICIENCY ANEMIA TYPE: Primary | ICD-10-CM

## 2018-10-11 DIAGNOSIS — G56.03 CARPAL TUNNEL SYNDROME, BILATERAL: Primary | ICD-10-CM

## 2018-10-11 PROCEDURE — 99214 OFFICE O/P EST MOD 30 MIN: CPT | Performed by: FAMILY MEDICINE

## 2018-10-11 NOTE — PROGRESS NOTES
Lis Shah is a 59year old female.   HPI:   Symone Rm is here for discussion of bilateral hand pain and supposed to get a flu shot but she is a bit nauseous, but she presented in May with carpal tunnel and got the night splints and it gotten better, bu History:  Social History    Tobacco Use      Smoking status: Never Smoker      Smokeless tobacco: Never Used    Alcohol use: No      Alcohol/week: 0.0 oz    Drug use: No       REVIEW OF SYSTEMS:   GENERAL HEALTH: feels well otherwise  SKIN: denies any unus

## 2018-10-15 ENCOUNTER — NURSE ONLY (OUTPATIENT)
Dept: FAMILY MEDICINE CLINIC | Facility: CLINIC | Age: 58
End: 2018-10-15

## 2018-10-15 DIAGNOSIS — G56.03 CARPAL TUNNEL SYNDROME, BILATERAL: ICD-10-CM

## 2018-10-15 DIAGNOSIS — E78.00 PURE HYPERCHOLESTEROLEMIA: ICD-10-CM

## 2018-10-15 DIAGNOSIS — D50.9 IRON DEFICIENCY ANEMIA, UNSPECIFIED IRON DEFICIENCY ANEMIA TYPE: ICD-10-CM

## 2018-10-15 DIAGNOSIS — E11.9 TYPE 2 DIABETES MELLITUS WITHOUT COMPLICATION, WITHOUT LONG-TERM CURRENT USE OF INSULIN (HCC): ICD-10-CM

## 2018-10-15 PROCEDURE — 84439 ASSAY OF FREE THYROXINE: CPT | Performed by: FAMILY MEDICINE

## 2018-10-15 PROCEDURE — 83550 IRON BINDING TEST: CPT | Performed by: FAMILY MEDICINE

## 2018-10-15 PROCEDURE — 82728 ASSAY OF FERRITIN: CPT | Performed by: FAMILY MEDICINE

## 2018-10-15 PROCEDURE — 83036 HEMOGLOBIN GLYCOSYLATED A1C: CPT | Performed by: FAMILY MEDICINE

## 2018-10-15 PROCEDURE — 83540 ASSAY OF IRON: CPT | Performed by: FAMILY MEDICINE

## 2018-10-15 PROCEDURE — 80061 LIPID PANEL: CPT | Performed by: FAMILY MEDICINE

## 2018-10-15 PROCEDURE — 84443 ASSAY THYROID STIM HORMONE: CPT | Performed by: FAMILY MEDICINE

## 2018-10-15 PROCEDURE — 84481 FREE ASSAY (FT-3): CPT | Performed by: FAMILY MEDICINE

## 2018-10-15 PROCEDURE — 36415 COLL VENOUS BLD VENIPUNCTURE: CPT | Performed by: FAMILY MEDICINE

## 2018-10-15 NOTE — PROGRESS NOTES
1 mint and 1 lavender tube collected from R AC using straight needle and 1 attempt    Pt tolerated and was sent home in stable condition

## 2018-10-16 ENCOUNTER — TELEPHONE (OUTPATIENT)
Dept: FAMILY MEDICINE CLINIC | Facility: CLINIC | Age: 58
End: 2018-10-16

## 2018-10-16 DIAGNOSIS — E03.9 ACQUIRED HYPOTHYROIDISM: Primary | ICD-10-CM

## 2018-10-16 DIAGNOSIS — E11.9 TYPE 2 DIABETES MELLITUS WITHOUT COMPLICATION, WITHOUT LONG-TERM CURRENT USE OF INSULIN (HCC): Primary | ICD-10-CM

## 2018-10-16 RX ORDER — LIOTHYRONINE SODIUM 25 UG/1
25 TABLET ORAL DAILY
Qty: 30 TABLET | Refills: 0 | Status: SHIPPED | OUTPATIENT
Start: 2018-10-16 | End: 2018-11-12

## 2018-10-16 NOTE — TELEPHONE ENCOUNTER
----- Message from Batsheva Fay DO sent at 10/16/2018  4:52 PM CDT -----  Notified of results, her labs overall looked  Very good , but her T3 is low and her T4 is borderline and her TSH was normal, will add CYtomel  And then lets recall free T3, TSh and

## 2018-10-24 ENCOUNTER — TELEPHONE (OUTPATIENT)
Dept: FAMILY MEDICINE CLINIC | Facility: CLINIC | Age: 58
End: 2018-10-24

## 2018-10-24 NOTE — TELEPHONE ENCOUNTER
Pt wants to know if she is able to get the flu and tdap shot while having sinus drainage. She has no fever and doesn't feel bad just a lot of drainage when she wakes up.  No cough during the day    Please return call to [phone]

## 2018-10-24 NOTE — TELEPHONE ENCOUNTER
LM for pt that she can have vaccinations done and she can schedule a nurse visit.   Office number provided

## 2018-10-26 ENCOUNTER — NURSE ONLY (OUTPATIENT)
Dept: FAMILY MEDICINE CLINIC | Facility: CLINIC | Age: 58
End: 2018-10-26

## 2018-10-26 ENCOUNTER — TELEPHONE (OUTPATIENT)
Dept: FAMILY MEDICINE CLINIC | Facility: CLINIC | Age: 58
End: 2018-10-26

## 2018-10-26 DIAGNOSIS — Z12.39 SCREENING FOR BREAST CANCER: Primary | ICD-10-CM

## 2018-10-26 PROCEDURE — 90686 IIV4 VACC NO PRSV 0.5 ML IM: CPT | Performed by: FAMILY MEDICINE

## 2018-10-26 PROCEDURE — 90472 IMMUNIZATION ADMIN EACH ADD: CPT | Performed by: FAMILY MEDICINE

## 2018-10-26 PROCEDURE — 90715 TDAP VACCINE 7 YRS/> IM: CPT | Performed by: FAMILY MEDICINE

## 2018-10-26 PROCEDURE — 90471 IMMUNIZATION ADMIN: CPT | Performed by: FAMILY MEDICINE

## 2018-10-26 NOTE — PROGRESS NOTES
Patient presents today for flu vaccine. Consent signed. Copy sent to scanning. Also advised that her daughter is having a baby next month and would like TDAP vaccine. Last TDAP 4/24/13. Spoke with Dr Ruddy Diop and verbal ok to give TDAP. VIS sheet given.  TDA

## 2018-10-26 NOTE — TELEPHONE ENCOUNTER
Report from Additional views of 4/24/17 -     Additional views with tomography and cone compression reveal no evidence of mass in the retroareolar region of the right breast at the site of slight asymmetry.  Findings are consistent with overlapping breast t

## 2018-10-26 NOTE — TELEPHONE ENCOUNTER
PT STOPPED IN AND WANTED TO LOOK AND SEE WHEN LAST MAMMO WAS. ADV TO PT THAT LAST ONE WAS DONE ON 4/22/17    PT WOULD LIKE TO SEE IF THERE IS ANYWAY PLACING ORDER JUST FOR DIAGNOSTIC.  WOULD PREFER JUST TO MAKE ONE TRIP    PLEASE PLACE ORDER AND PT WILL ZEKE

## 2018-11-07 ENCOUNTER — TELEPHONE (OUTPATIENT)
Dept: FAMILY MEDICINE CLINIC | Facility: CLINIC | Age: 58
End: 2018-11-07

## 2018-11-07 NOTE — TELEPHONE ENCOUNTER
Pt is has about 8 pills left of her thyroid (Liothyronine Sodium 25 MCG Oral Tab) medicine. She will be out of town when she runs out. She was told she needs blood work when she is done but she will be out of town.  She wants to know if she just needs a ref

## 2018-11-07 NOTE — TELEPHONE ENCOUNTER
Orders are in epic for bloodwork- I advised pt she can schedule nurse visit      Pt will call back to schedule

## 2018-11-09 ENCOUNTER — NURSE ONLY (OUTPATIENT)
Dept: FAMILY MEDICINE CLINIC | Facility: CLINIC | Age: 58
End: 2018-11-09

## 2018-11-09 DIAGNOSIS — E03.9 ACQUIRED HYPOTHYROIDISM: ICD-10-CM

## 2018-11-09 PROCEDURE — 84443 ASSAY THYROID STIM HORMONE: CPT | Performed by: FAMILY MEDICINE

## 2018-11-09 PROCEDURE — 84481 FREE ASSAY (FT-3): CPT | Performed by: FAMILY MEDICINE

## 2018-11-09 PROCEDURE — 84439 ASSAY OF FREE THYROXINE: CPT | Performed by: FAMILY MEDICINE

## 2018-11-09 PROCEDURE — 36415 COLL VENOUS BLD VENIPUNCTURE: CPT | Performed by: FAMILY MEDICINE

## 2018-11-10 ENCOUNTER — TELEPHONE (OUTPATIENT)
Dept: FAMILY MEDICINE CLINIC | Facility: CLINIC | Age: 58
End: 2018-11-10

## 2018-11-10 DIAGNOSIS — G47.30 SLEEP APNEA IN ADULT: Primary | ICD-10-CM

## 2018-11-10 NOTE — TELEPHONE ENCOUNTER
----- Message from Berenice Amor DO sent at 11/10/2018 10:41 AM CST -----  Can notify gifty, that her thyroid looks good right now, lets keep the dose the same and recall TSH T4 and free T3 in 3 months

## 2018-11-12 RX ORDER — LIOTHYRONINE SODIUM 25 UG/1
25 TABLET ORAL DAILY
Qty: 90 TABLET | Refills: 0 | Status: SHIPPED | OUTPATIENT
Start: 2018-11-12 | End: 2019-02-11

## 2018-11-12 NOTE — TELEPHONE ENCOUNTER
Pt advised of results- verbalized understanding    Future orders and recall placed    Pt asked for refill of medication- routed to provider for review

## 2018-12-07 ENCOUNTER — TELEPHONE (OUTPATIENT)
Dept: FAMILY MEDICINE CLINIC | Facility: CLINIC | Age: 58
End: 2018-12-07

## 2018-12-07 ENCOUNTER — NURSE ONLY (OUTPATIENT)
Dept: FAMILY MEDICINE CLINIC | Facility: CLINIC | Age: 58
End: 2018-12-07

## 2018-12-07 DIAGNOSIS — R31.0 GROSS HEMATURIA: ICD-10-CM

## 2018-12-07 DIAGNOSIS — R10.9 ABDOMINAL PRESSURE: ICD-10-CM

## 2018-12-07 DIAGNOSIS — R82.90 ABNORMAL URINALYSIS: Primary | ICD-10-CM

## 2018-12-07 PROCEDURE — 87186 SC STD MICRODIL/AGAR DIL: CPT | Performed by: FAMILY MEDICINE

## 2018-12-07 PROCEDURE — 87086 URINE CULTURE/COLONY COUNT: CPT | Performed by: FAMILY MEDICINE

## 2018-12-07 PROCEDURE — 81003 URINALYSIS AUTO W/O SCOPE: CPT | Performed by: FAMILY MEDICINE

## 2018-12-07 PROCEDURE — 87077 CULTURE AEROBIC IDENTIFY: CPT | Performed by: FAMILY MEDICINE

## 2018-12-07 RX ORDER — NITROFURANTOIN 25; 75 MG/1; MG/1
100 CAPSULE ORAL 2 TIMES DAILY
Qty: 10 CAPSULE | Refills: 0 | Status: SHIPPED | OUTPATIENT
Start: 2018-12-07 | End: 2018-12-12

## 2018-12-07 NOTE — PROGRESS NOTES
Patient to clinic for urine dip. Result to Dr Marques Kilgore    Per DS, send for culture. Antibiotic will be sent to pharmacy for patient. Patient notified and verbalized understanding.    Request script to [facility]

## 2018-12-07 NOTE — TELEPHONE ENCOUNTER
Pt called, thinks she has a UTI. Took some otc medications and urine is now orange. Would like to discuss.   Please call pt at [phone]

## 2018-12-07 NOTE — TELEPHONE ENCOUNTER
Luis Constantino from [facility] called, pt is there right now, have we sent over the script for the antibiotic yet? Please call Luis Constantino at [phone].

## 2018-12-07 NOTE — TELEPHONE ENCOUNTER
Patient states she had blood in her urine last night and feeling of pressure. She took Azo last night because the pressure was so bad. Urine is orange today. Discussed with Dr Navi Mcknight who states have patient come for urine dip.     Patient notified and v

## 2018-12-10 ENCOUNTER — TELEPHONE (OUTPATIENT)
Dept: FAMILY MEDICINE CLINIC | Facility: CLINIC | Age: 58
End: 2018-12-10

## 2018-12-10 NOTE — TELEPHONE ENCOUNTER
----- Message from Lucia Infante DO sent at 12/10/2018  7:55 AM CST -----  Can notify Cora Esperanza, her urine was positive for UTI, the ABX she is on should cover it, but if her Sx return she should let us pinky

## 2018-12-27 ENCOUNTER — PATIENT OUTREACH (OUTPATIENT)
Dept: FAMILY MEDICINE CLINIC | Facility: CLINIC | Age: 58
End: 2018-12-27

## 2019-01-09 ENCOUNTER — OFFICE VISIT (OUTPATIENT)
Dept: FAMILY MEDICINE CLINIC | Facility: CLINIC | Age: 59
End: 2019-01-09

## 2019-01-09 VITALS
DIASTOLIC BLOOD PRESSURE: 76 MMHG | OXYGEN SATURATION: 99 % | HEIGHT: 60 IN | BODY MASS INDEX: 28.74 KG/M2 | TEMPERATURE: 98 F | RESPIRATION RATE: 16 BRPM | WEIGHT: 146.38 LBS | SYSTOLIC BLOOD PRESSURE: 130 MMHG | HEART RATE: 70 BPM

## 2019-01-09 DIAGNOSIS — J98.01 BRONCHOSPASM: ICD-10-CM

## 2019-01-09 DIAGNOSIS — J06.9 VIRAL URI: Primary | ICD-10-CM

## 2019-01-09 PROCEDURE — 99214 OFFICE O/P EST MOD 30 MIN: CPT | Performed by: FAMILY MEDICINE

## 2019-01-09 RX ORDER — METHYLPREDNISOLONE 4 MG/1
TABLET ORAL
Qty: 1 KIT | Refills: 0 | Status: SHIPPED | OUTPATIENT
Start: 2019-01-09 | End: 2019-04-29 | Stop reason: ALTCHOICE

## 2019-01-09 NOTE — PROGRESS NOTES
HPI:   Sergio Price is a 59year old female who presents for upper respiratory symptoms for  1  weeks. Patient reports congestion, cough with grey colored sputum, cough is keeping pt up at night, wheezing.  Shew was in [place] At her daughters and th Insomnia    • Varicose veins       Past Surgical History:   Procedure Laterality Date   • CHOLECYSTECTOMY     • HYSTERECTOMY     • OTHER SURGICAL HISTORY      cystocele repair   • OTHER SURGICAL HISTORY      rectocele repair   • OTHER SURGICAL HISTORY Pack 1 kit 0     Sig: As directed.        Imaging & Consults:  None

## 2019-01-18 ENCOUNTER — TELEPHONE (OUTPATIENT)
Dept: FAMILY MEDICINE CLINIC | Facility: CLINIC | Age: 59
End: 2019-01-18

## 2019-01-18 DIAGNOSIS — Z12.39 BREAST CANCER SCREENING: Primary | ICD-10-CM

## 2019-01-18 NOTE — TELEPHONE ENCOUNTER
Called [facility] and spoke to Natanael Carrasquillo. Advised that Dr Sharyle Bjork would have to addend original referral to include devin lucinda 2D + 3D, or cancel order and place new one. Stated she can't do that from her end. Then patient can do both at same visit.

## 2019-01-18 NOTE — TELEPHONE ENCOUNTER
Pt called, said she discussed with Dr. Brian Shoemaker about how she gets the run around with her mammograms, going her and going there, and he said we would look into where she is to get her specific mammogram done at with her insurance.   Pt said this was over a we

## 2019-01-18 NOTE — TELEPHONE ENCOUNTER
What run around? I don’t know if I can place an ordefyfor the 2D tomos without first doing a regular mammogram, maybe we can contact [facility] and find out if we can do that and if so then place the order.  And I will sign

## 2019-01-18 NOTE — TELEPHONE ENCOUNTER
Call from patient. States that when she gets her mammograms done, she always needs to go back for the [facility] lucinda 2D + 3D. States she wants order placed for this now, so she can do both at the same time, instead of making 2 trips.  States she talked to Dr Eli Kim

## 2019-01-21 NOTE — TELEPHONE ENCOUNTER
Pt was advised that SHIRA amd LANDEN orders were placed and per [facility] pt should be able to get them both done at the same time.     PT was provided with number to central scheduling to call and get visit scheduled

## 2019-02-04 ENCOUNTER — HOSPITAL ENCOUNTER (OUTPATIENT)
Dept: MAMMOGRAPHY | Age: 59
Discharge: HOME | End: 2019-02-04
Attending: FAMILY MEDICINE
Payer: COMMERCIAL

## 2019-02-04 DIAGNOSIS — Z12.39 BREAST CANCER SCREENING: ICD-10-CM

## 2019-02-04 PROCEDURE — 77063 BREAST TOMOSYNTHESIS BI: CPT | Performed by: FAMILY MEDICINE

## 2019-02-04 PROCEDURE — 77067 SCR MAMMO BI INCL CAD: CPT | Performed by: FAMILY MEDICINE

## 2019-02-11 RX ORDER — LIOTHYRONINE SODIUM 25 UG/1
25 TABLET ORAL DAILY
Qty: 90 TABLET | Refills: 0 | Status: SHIPPED | OUTPATIENT
Start: 2019-02-11 | End: 2019-05-12

## 2019-02-11 NOTE — TELEPHONE ENCOUNTER
Spouse called, pt needs refill on Liothyronine, please send this refill to [facility] in [place]. Pt also needs refill on Glucose Blood (ONETOUCH ULTRA BLUE) In Vitro Strip-please send this refill to [facility].   Please call spouse at [phone]

## 2019-02-13 ENCOUNTER — TELEPHONE (OUTPATIENT)
Dept: FAMILY MEDICINE CLINIC | Facility: CLINIC | Age: 59
End: 2019-02-13

## 2019-02-13 NOTE — TELEPHONE ENCOUNTER
Test strips were sent to the wrong pharmacy- need to go to [address] per spouse    Scripts sent to correct pharmacy and cancelled at [facility]

## 2019-02-16 ENCOUNTER — NURSE ONLY (OUTPATIENT)
Dept: FAMILY MEDICINE CLINIC | Facility: CLINIC | Age: 59
End: 2019-02-16

## 2019-02-16 DIAGNOSIS — E78.00 PURE HYPERCHOLESTEROLEMIA: ICD-10-CM

## 2019-02-16 DIAGNOSIS — E61.1 IRON DEFICIENCY: Primary | ICD-10-CM

## 2019-02-16 DIAGNOSIS — G47.30 SLEEP APNEA IN ADULT: ICD-10-CM

## 2019-02-16 LAB
DEPRECATED HBV CORE AB SER IA-ACNC: 48.6 NG/ML (ref 18–340)
T3FREE SERPL-MCNC: 2.46 PG/ML (ref 2.4–4.2)
T4 FREE SERPL-MCNC: 0.3 NG/DL (ref 0.8–1.7)
TSI SER-ACNC: 0.72 MIU/ML (ref 0.36–3.74)

## 2019-02-16 PROCEDURE — 84443 ASSAY THYROID STIM HORMONE: CPT | Performed by: FAMILY MEDICINE

## 2019-02-16 PROCEDURE — 82728 ASSAY OF FERRITIN: CPT | Performed by: FAMILY MEDICINE

## 2019-02-16 PROCEDURE — 84439 ASSAY OF FREE THYROXINE: CPT | Performed by: FAMILY MEDICINE

## 2019-02-16 PROCEDURE — 36415 COLL VENOUS BLD VENIPUNCTURE: CPT | Performed by: FAMILY MEDICINE

## 2019-02-16 PROCEDURE — 84481 FREE ASSAY (FT-3): CPT | Performed by: FAMILY MEDICINE

## 2019-02-16 NOTE — PROGRESS NOTES
Patient to clinic for thyroid labs. Asking if iron level can be checked as well. Per DS, add ferritin    Mint tube drawn right ac x 1 attempt    Patient states she is due for A1c as well. Per DS, can repeat A1c next month with lipid at that time.  Order

## 2019-02-18 ENCOUNTER — TELEPHONE (OUTPATIENT)
Dept: FAMILY MEDICINE CLINIC | Facility: CLINIC | Age: 59
End: 2019-02-18

## 2019-02-18 DIAGNOSIS — G47.00 INSOMNIA, UNSPECIFIED TYPE: Primary | ICD-10-CM

## 2019-02-18 DIAGNOSIS — E11.9 TYPE 2 DIABETES MELLITUS WITHOUT COMPLICATION, WITHOUT LONG-TERM CURRENT USE OF INSULIN (HCC): ICD-10-CM

## 2019-02-18 NOTE — TELEPHONE ENCOUNTER
----- Message from Oksana Salazar DO sent at 2/18/2019  8:01 AM CST -----  Can notify Francisca Overall her labs look good, thyroid is where I think it should be, her iron counts are good as well, lets recall TSH and Ferritin in 6 months everything else stays the same

## 2019-02-18 NOTE — TELEPHONE ENCOUNTER
Pt was advised of results- verbalized understanding    Future orders with recall    PT states she woke up with pain on the left side of her chest.  Pt states that she feels like she is starting to come down with something.   She denies chest pressure, tight

## 2019-02-25 ENCOUNTER — TELEPHONE (OUTPATIENT)
Dept: FAMILY MEDICINE CLINIC | Facility: CLINIC | Age: 59
End: 2019-02-25

## 2019-02-25 NOTE — TELEPHONE ENCOUNTER
Pt was advised that [facility] paperwork is ready to get picked up- she will be by to     Copy made and sent to scanning

## 2019-03-23 ENCOUNTER — NURSE ONLY (OUTPATIENT)
Dept: FAMILY MEDICINE CLINIC | Facility: CLINIC | Age: 59
End: 2019-03-23

## 2019-03-23 DIAGNOSIS — E78.00 PURE HYPERCHOLESTEROLEMIA: ICD-10-CM

## 2019-03-23 DIAGNOSIS — E11.9 TYPE 2 DIABETES MELLITUS WITHOUT COMPLICATION, WITHOUT LONG-TERM CURRENT USE OF INSULIN (HCC): ICD-10-CM

## 2019-03-23 LAB
CHOLEST SMN-MCNC: 186 MG/DL (ref ?–200)
EST. AVERAGE GLUCOSE BLD GHB EST-MCNC: 151 MG/DL (ref 68–126)
HBA1C MFR BLD HPLC: 6.9 % (ref ?–5.7)
HDLC SERPL-MCNC: 65 MG/DL (ref 40–59)
LDLC SERPL CALC-MCNC: 101 MG/DL (ref ?–100)
NONHDLC SERPL-MCNC: 121 MG/DL (ref ?–130)
TRIGL SERPL-MCNC: 100 MG/DL (ref 30–149)
VLDLC SERPL CALC-MCNC: 20 MG/DL (ref 0–30)

## 2019-03-23 PROCEDURE — 80061 LIPID PANEL: CPT | Performed by: FAMILY MEDICINE

## 2019-03-23 PROCEDURE — 83036 HEMOGLOBIN GLYCOSYLATED A1C: CPT | Performed by: FAMILY MEDICINE

## 2019-03-23 PROCEDURE — 36415 COLL VENOUS BLD VENIPUNCTURE: CPT | Performed by: FAMILY MEDICINE

## 2019-03-23 NOTE — PROGRESS NOTES
1 mint and 1lavender tube collected from R AC using straight needle and 1 attempt    Pt tolerated and was sent home in stable condition

## 2019-03-25 ENCOUNTER — TELEPHONE (OUTPATIENT)
Dept: FAMILY MEDICINE CLINIC | Facility: CLINIC | Age: 59
End: 2019-03-25

## 2019-03-25 DIAGNOSIS — E11.9 TYPE 2 DIABETES MELLITUS WITHOUT COMPLICATION, WITHOUT LONG-TERM CURRENT USE OF INSULIN (HCC): Primary | ICD-10-CM

## 2019-03-25 NOTE — TELEPHONE ENCOUNTER
----- Message from Chele Meredith DO sent at 3/25/2019  8:37 AM CDT -----  Can notify gifty  Her labs look good her A1c is holding steady at 6.9, her cholesterol looks good, lets keep all meds the same right now and work on diet and exercise and if she gets

## 2019-04-29 ENCOUNTER — OFFICE VISIT (OUTPATIENT)
Dept: FAMILY MEDICINE CLINIC | Facility: CLINIC | Age: 59
End: 2019-04-29

## 2019-04-29 VITALS
DIASTOLIC BLOOD PRESSURE: 70 MMHG | TEMPERATURE: 98 F | RESPIRATION RATE: 16 BRPM | SYSTOLIC BLOOD PRESSURE: 128 MMHG | BODY MASS INDEX: 27.88 KG/M2 | WEIGHT: 142 LBS | HEIGHT: 60 IN | OXYGEN SATURATION: 98 % | HEART RATE: 97 BPM

## 2019-04-29 DIAGNOSIS — M79.642 HAND PAIN, LEFT: ICD-10-CM

## 2019-04-29 DIAGNOSIS — J98.01 BRONCHOSPASM: ICD-10-CM

## 2019-04-29 DIAGNOSIS — G56.01 CARPAL TUNNEL SYNDROME OF RIGHT WRIST: Primary | ICD-10-CM

## 2019-04-29 DIAGNOSIS — J30.81 ALLERGIC RHINITIS DUE TO ANIMAL HAIR AND DANDER: ICD-10-CM

## 2019-04-29 PROCEDURE — 86003 ALLG SPEC IGE CRUDE XTRC EA: CPT | Performed by: FAMILY MEDICINE

## 2019-04-29 PROCEDURE — 99214 OFFICE O/P EST MOD 30 MIN: CPT | Performed by: FAMILY MEDICINE

## 2019-04-29 PROCEDURE — 36415 COLL VENOUS BLD VENIPUNCTURE: CPT | Performed by: FAMILY MEDICINE

## 2019-04-29 PROCEDURE — 82785 ASSAY OF IGE: CPT | Performed by: FAMILY MEDICINE

## 2019-04-29 RX ORDER — METHYLPREDNISOLONE 4 MG/1
TABLET ORAL
Qty: 1 KIT | Refills: 0 | Status: SHIPPED | OUTPATIENT
Start: 2019-04-29 | End: 2019-07-23 | Stop reason: ALTCHOICE

## 2019-04-29 NOTE — PROGRESS NOTES
HPI:   Lalo Dominguez is a 59year old female who presents for upper respiratory symptoms for  1  weeks. Patient reports sore throat, congestion, clear colored nasal discharge, wheezing.  She noted this happens every time she goes to [place] in [place] Esophageal reflux    • H. pylori infection    • Hyperlipidemia    • Hypertension    • Insomnia    • Varicose veins       Past Surgical History:   Procedure Laterality Date   • CHOLECYSTECTOMY     • HYSTERECTOMY     • OTHER SURGICAL HISTORY      cystocele r left    Orders Placed This Encounter      Allergens, Zone 8 [E]      Adult Food Allergy Prof [E]    AWAIT ALLERGY PROFILE  REFERRED TO DR. Lynda Pantoja AT [place] [id number] for this Visit:  Requested Prescriptions     Signed Prescripti

## 2019-05-01 ENCOUNTER — TELEPHONE (OUTPATIENT)
Dept: FAMILY MEDICINE CLINIC | Facility: CLINIC | Age: 59
End: 2019-05-01

## 2019-05-01 RX ORDER — FLUTICASONE PROPIONATE 50 MCG
2 SPRAY, SUSPENSION (ML) NASAL NIGHTLY
Qty: 16 G | Refills: 3 | Status: CANCELLED | OUTPATIENT
Start: 2019-05-01

## 2019-05-01 NOTE — TELEPHONE ENCOUNTER
----- Message from Moises Zavala DO sent at 5/1/2019 12:43 PM CDT -----  Can notify Arie Rodriguez, the good news is she si not allergic to anything, the bad news is she may still have allergies  Just did not show up in the panel we tested, so Iw oudl suggest when

## 2019-05-01 NOTE — TELEPHONE ENCOUNTER
Pt states she takes claritin daily- but has not been taking Flonase.     Scripts pended for provider review

## 2019-05-07 RX ORDER — PREDNISONE 10 MG/1
TABLET ORAL
Qty: 18 TABLET | Refills: 0 | Status: SHIPPED | OUTPATIENT
Start: 2019-05-07 | End: 2019-07-23 | Stop reason: ALTCHOICE

## 2019-05-13 RX ORDER — LIOTHYRONINE SODIUM 25 UG/1
25 TABLET ORAL DAILY
Qty: 90 TABLET | Refills: 0 | Status: SHIPPED | OUTPATIENT
Start: 2019-05-13 | End: 2019-08-06

## 2019-05-13 NOTE — TELEPHONE ENCOUNTER
LOV: 4/29/19  Last Refill: 2/11/19 #90 0 RF    Last Labs: TSH  0.717  T4: 0.3   2/16/19    Future Appointments   Date Time Provider Pam Littlejohn   5/29/2019  3:10 PM Janie Maldonado MD [facility] NP MATT Rodrigues

## 2019-05-18 ENCOUNTER — TELEPHONE (OUTPATIENT)
Dept: FAMILY MEDICINE CLINIC | Facility: CLINIC | Age: 59
End: 2019-05-18

## 2019-05-18 NOTE — TELEPHONE ENCOUNTER
Well lets try some plain tylenol 2 po Q6, if that doesn;t work then we may give aDVIL 600 MG A TRY EVERY 8 WITH FOOD

## 2019-05-18 NOTE — TELEPHONE ENCOUNTER
Patient notified and verbalized understanding. States she has already tried ibuprofen 4 tabs last night and it did not help. Per DS, take ibuprofen 600 mg every 8 hours with food consistently over the weekend.  If pain feels closer to the hip surface

## 2019-05-18 NOTE — TELEPHONE ENCOUNTER
Pt called, is having right hip pain, no known injury, what does Dr. Pam Black want her to take for pain.   Please call pt at home number [phone]

## 2019-05-29 PROBLEM — G56.01 CARPAL TUNNEL SYNDROME ON RIGHT: Status: ACTIVE | Noted: 2019-05-29

## 2019-05-30 ENCOUNTER — TELEPHONE (OUTPATIENT)
Dept: FAMILY MEDICINE CLINIC | Facility: CLINIC | Age: 59
End: 2019-05-30

## 2019-05-30 DIAGNOSIS — E11.65 TYPE 2 DIABETES MELLITUS WITH HYPERGLYCEMIA, WITHOUT LONG-TERM CURRENT USE OF INSULIN (HCC): Primary | ICD-10-CM

## 2019-06-24 ENCOUNTER — TELEPHONE (OUTPATIENT)
Dept: FAMILY MEDICINE CLINIC | Facility: CLINIC | Age: 59
End: 2019-06-24

## 2019-06-24 DIAGNOSIS — G56.03 BILATERAL CARPAL TUNNEL SYNDROME: Primary | ICD-10-CM

## 2019-06-24 NOTE — TELEPHONE ENCOUNTER
Its in there, next time a couple of days heads up will be needed, so that we can guarantee it will get approved

## 2019-06-24 NOTE — TELEPHONE ENCOUNTER
Pt needs a new referral to see Dr Lizette Aguirre. She used the referral from April already.  She has an appt this afternoon at 2pm. Please return call to [phone]

## 2019-06-26 ENCOUNTER — TELEPHONE (OUTPATIENT)
Dept: FAMILY MEDICINE CLINIC | Facility: CLINIC | Age: 59
End: 2019-06-26

## 2019-06-26 NOTE — TELEPHONE ENCOUNTER
FYI:    PT CALLED AND ADV THAT SHE IS HAVING CARPEL TUNEL SURGERY ON 7/16/19. WAS ADV THAT [employer]TS INS IS CHANGING ON 71/19. WAS ADV TO WAIT UNTIL AFTER 7/1/19 TO SCHEDULE EKG AND BLOOD WORK. ADV THAT DR HERNÁNDEZ'S OFFICE WILL BE SENDING OVER ORDERS SOON.

## 2019-06-26 NOTE — TELEPHONE ENCOUNTER
Routing to provider as Corin Quiñones- please advise if you receive anything from Dr. Kay Ruiz office for pre-op RN will call

## 2019-06-27 ENCOUNTER — TELEPHONE (OUTPATIENT)
Dept: FAMILY MEDICINE CLINIC | Facility: CLINIC | Age: 59
End: 2019-06-27

## 2019-06-27 RX ORDER — OMEPRAZOLE 20 MG/1
CAPSULE, DELAYED RELEASE ORAL
Qty: 180 CAPSULE | Refills: 3 | Status: SHIPPED | OUTPATIENT
Start: 2019-06-27 | End: 2020-06-23

## 2019-06-27 NOTE — TELEPHONE ENCOUNTER
[facility] sent over request for Omeprazole 20mg    Per order by Dr. Yesenia Em to RF #90 with 3 RF

## 2019-07-02 NOTE — TELEPHONE ENCOUNTER
Future Appointments   Date Time Provider Pam Littlejohn   7/9/2019  8:45 AM EMG CHAU NURSE [facility] EM[facility] [place]   7/16/2019  7:00 AM Camden Kelsey MD [id number] [place] [id number] [facility]   7/23/2019  4:00 PM Yoana August DO [facility] [place]   7/24/2019  7:00 AM Osvaldo

## 2019-07-09 ENCOUNTER — NURSE ONLY (OUTPATIENT)
Dept: FAMILY MEDICINE CLINIC | Facility: CLINIC | Age: 59
End: 2019-07-09

## 2019-07-09 DIAGNOSIS — G56.01 CARPAL TUNNEL SYNDROME ON RIGHT: ICD-10-CM

## 2019-07-09 LAB
ANION GAP SERPL CALC-SCNC: 7 MMOL/L (ref 0–18)
BUN BLD-MCNC: 22 MG/DL (ref 7–18)
BUN/CREAT SERPL: 31 (ref 10–20)
CALCIUM BLD-MCNC: 9.7 MG/DL (ref 8.5–10.1)
CHLORIDE SERPL-SCNC: 105 MMOL/L (ref 98–112)
CO2 SERPL-SCNC: 27 MMOL/L (ref 21–32)
CREAT BLD-MCNC: 0.71 MG/DL (ref 0.55–1.02)
GLUCOSE BLD-MCNC: 117 MG/DL (ref 70–99)
OSMOLALITY SERPL CALC.SUM OF ELEC: 292 MOSM/KG (ref 275–295)
POTASSIUM SERPL-SCNC: 4.4 MMOL/L (ref 3.5–5.1)
SODIUM SERPL-SCNC: 139 MMOL/L (ref 136–145)

## 2019-07-09 PROCEDURE — 80048 BASIC METABOLIC PNL TOTAL CA: CPT | Performed by: ORTHOPAEDIC SURGERY

## 2019-07-09 NOTE — PROGRESS NOTES
Patient to clinic for EKG and BMP per Dr Swapna Chávez. Mint tube drawn right AC x 1 attempt.   EKG performed

## 2019-07-11 ENCOUNTER — TELEPHONE (OUTPATIENT)
Dept: FAMILY MEDICINE CLINIC | Facility: CLINIC | Age: 59
End: 2019-07-11

## 2019-07-17 ENCOUNTER — TELEPHONE (OUTPATIENT)
Dept: FAMILY MEDICINE CLINIC | Facility: CLINIC | Age: 59
End: 2019-07-17

## 2019-07-17 DIAGNOSIS — E11.9 TYPE 2 DIABETES MELLITUS WITHOUT COMPLICATION, WITHOUT LONG-TERM CURRENT USE OF INSULIN (HCC): Primary | ICD-10-CM

## 2019-07-17 NOTE — TELEPHONE ENCOUNTER
Pt needs new referral for the new insurance company to see Dr Aury Daniel. New insurance started 7-1-19.

## 2019-07-19 ENCOUNTER — TELEPHONE (OUTPATIENT)
Dept: FAMILY MEDICINE CLINIC | Facility: CLINIC | Age: 59
End: 2019-07-19

## 2019-07-19 DIAGNOSIS — J30.2 SEASONAL ALLERGIC RHINITIS, UNSPECIFIED TRIGGER: ICD-10-CM

## 2019-07-19 RX ORDER — ALBUTEROL SULFATE 90 UG/1
2 AEROSOL, METERED RESPIRATORY (INHALATION) EVERY 4 HOURS PRN
Qty: 3 INHALER | Refills: 3 | Status: SHIPPED | OUTPATIENT
Start: 2019-07-19 | End: 2021-08-16

## 2019-07-19 NOTE — TELEPHONE ENCOUNTER
Patient states that she needs a refill on her Albuterol Inhaler - Ayanna Cox  Last refill on 3 26 2018 #3 with 3 refills

## 2019-07-19 NOTE — TELEPHONE ENCOUNTER
Pt called, would like a refill on her inhaler. [id number].    Please call pt at [phone]

## 2019-07-23 ENCOUNTER — OFFICE VISIT (OUTPATIENT)
Dept: FAMILY MEDICINE CLINIC | Facility: CLINIC | Age: 59
End: 2019-07-23

## 2019-07-23 VITALS
HEIGHT: 60 IN | RESPIRATION RATE: 16 BRPM | WEIGHT: 140.38 LBS | TEMPERATURE: 98 F | DIASTOLIC BLOOD PRESSURE: 78 MMHG | SYSTOLIC BLOOD PRESSURE: 102 MMHG | HEART RATE: 72 BPM | BODY MASS INDEX: 27.56 KG/M2

## 2019-07-23 DIAGNOSIS — M76.30 ILIOTIBIAL BAND SYNDROME, UNSPECIFIED LATERALITY: ICD-10-CM

## 2019-07-23 DIAGNOSIS — Z00.00 ROUTINE HEALTH MAINTENANCE: Primary | ICD-10-CM

## 2019-07-23 DIAGNOSIS — M70.62 TROCHANTERIC BURSITIS OF BOTH HIPS: ICD-10-CM

## 2019-07-23 DIAGNOSIS — D50.8 OTHER IRON DEFICIENCY ANEMIA: ICD-10-CM

## 2019-07-23 DIAGNOSIS — E11.9 CONTROLLED TYPE 2 DIABETES MELLITUS WITHOUT COMPLICATION, WITHOUT LONG-TERM CURRENT USE OF INSULIN (HCC): ICD-10-CM

## 2019-07-23 DIAGNOSIS — M70.61 TROCHANTERIC BURSITIS OF BOTH HIPS: ICD-10-CM

## 2019-07-23 PROCEDURE — 99396 PREV VISIT EST AGE 40-64: CPT | Performed by: FAMILY MEDICINE

## 2019-07-23 NOTE — PROGRESS NOTES
HPI:   Israel Buitrago is a 59year old female who presents for a complete physical exam. Symptoms: denies discharge, itching, burning or dysuria. Patient complains of not much SHe recently had carpal tunnel surgery ad went back to work today, ?  Maybe t lungs every 4 (four) hours as needed for Wheezing. Disp: 3 Inhaler Rfl: 3   omeprazole 20 MG Oral Capsule Delayed Release TAKE 1 CAPSULE TWICE A DAY Disp: 180 capsule Rfl: 3   LIOTHYRONINE SODIUM 25 MCG Oral Tab Take 1 tablet (25 mcg total) by mouth daily. Mother         Cancer in the liver   • Other (Other) Mother         non-alcoholic cirrhosis   • Heart Disorder Father       Social History:   Social History    Tobacco Use      Smoking status: Never Smoker      Smokeless tobacco: Never Used    Alcohol use: Oriented times three,cranial nerves are intact,motor and sensory are grossly intact    ASSESSMENT AND PLAN:   Mabel Abreu is a 59year old female who presents for a complete physical exam. NO Pap and pelvic done. Self breast exam explained.  Health Visit:  Requested Prescriptions      No prescriptions requested or ordered in this encounter       Imaging & Consults:  None

## 2019-07-24 ENCOUNTER — NURSE ONLY (OUTPATIENT)
Dept: FAMILY MEDICINE CLINIC | Facility: CLINIC | Age: 59
End: 2019-07-24

## 2019-07-24 DIAGNOSIS — G47.00 INSOMNIA, UNSPECIFIED TYPE: ICD-10-CM

## 2019-07-24 DIAGNOSIS — E78.00 PURE HYPERCHOLESTEROLEMIA: Primary | ICD-10-CM

## 2019-07-24 DIAGNOSIS — E11.9 TYPE 2 DIABETES MELLITUS WITHOUT COMPLICATION, WITHOUT LONG-TERM CURRENT USE OF INSULIN (HCC): ICD-10-CM

## 2019-07-24 DIAGNOSIS — E11.9 CONTROLLED TYPE 2 DIABETES MELLITUS WITHOUT COMPLICATION, WITHOUT LONG-TERM CURRENT USE OF INSULIN (HCC): ICD-10-CM

## 2019-07-24 LAB
CHOLEST SMN-MCNC: 192 MG/DL (ref ?–200)
CREAT UR-SCNC: 34.1 MG/DL
DEPRECATED HBV CORE AB SER IA-ACNC: 57.9 NG/ML (ref 18–340)
EST. AVERAGE GLUCOSE BLD GHB EST-MCNC: 154 MG/DL (ref 68–126)
HBA1C MFR BLD HPLC: 7 % (ref ?–5.7)
HDLC SERPL-MCNC: 72 MG/DL (ref 40–59)
LDLC SERPL CALC-MCNC: 106 MG/DL (ref ?–100)
MICROALBUMIN UR-MCNC: <0.5 MG/DL
NONHDLC SERPL-MCNC: 120 MG/DL (ref ?–130)
TRIGL SERPL-MCNC: 69 MG/DL (ref 30–149)
TSI SER-ACNC: 1.05 MIU/ML (ref 0.36–3.74)
VLDLC SERPL CALC-MCNC: 14 MG/DL (ref 0–30)

## 2019-07-24 PROCEDURE — 82570 ASSAY OF URINE CREATININE: CPT | Performed by: FAMILY MEDICINE

## 2019-07-24 PROCEDURE — 83036 HEMOGLOBIN GLYCOSYLATED A1C: CPT | Performed by: FAMILY MEDICINE

## 2019-07-24 PROCEDURE — 84443 ASSAY THYROID STIM HORMONE: CPT | Performed by: FAMILY MEDICINE

## 2019-07-24 PROCEDURE — 82728 ASSAY OF FERRITIN: CPT | Performed by: FAMILY MEDICINE

## 2019-07-24 PROCEDURE — 82043 UR ALBUMIN QUANTITATIVE: CPT | Performed by: FAMILY MEDICINE

## 2019-07-24 PROCEDURE — 80061 LIPID PANEL: CPT | Performed by: FAMILY MEDICINE

## 2019-07-24 NOTE — PROGRESS NOTES
Patient presented today for lab draw. 1 mint, 1 lav tube(s) drawn from right ac area x1 with butterfly needle. Patient tolerated well.

## 2019-07-25 ENCOUNTER — TELEPHONE (OUTPATIENT)
Dept: FAMILY MEDICINE CLINIC | Facility: CLINIC | Age: 59
End: 2019-07-25

## 2019-07-25 NOTE — TELEPHONE ENCOUNTER
Received Diabetic Eye Exam from [facility], [place]. Seen on 7/25/19. No retinopathy. Flowsheet updated. Sent for scanning.

## 2019-07-26 ENCOUNTER — TELEPHONE (OUTPATIENT)
Dept: FAMILY MEDICINE CLINIC | Facility: CLINIC | Age: 59
End: 2019-07-26

## 2019-07-26 DIAGNOSIS — E78.00 PURE HYPERCHOLESTEROLEMIA: ICD-10-CM

## 2019-07-26 DIAGNOSIS — D50.8 OTHER IRON DEFICIENCY ANEMIA: Primary | ICD-10-CM

## 2019-07-26 DIAGNOSIS — E11.9 CONTROLLED TYPE 2 DIABETES MELLITUS WITHOUT COMPLICATION, WITHOUT LONG-TERM CURRENT USE OF INSULIN (HCC): ICD-10-CM

## 2019-07-26 NOTE — TELEPHONE ENCOUNTER
Notes recorded by Dhara Joseph DO on 7/26/2019 at 8:27 AM CDT  OK forget the januvia, there;s a chance for allergic issues based on her previous allergies, I just want her to work on her weight and try and get some exercise, which I know is going to be t

## 2019-07-26 NOTE — TELEPHONE ENCOUNTER
Per DS insulin can be an option down the road but no where close at this time    Called the pt to let her know- left a message for her with the above information

## 2019-07-26 NOTE — TELEPHONE ENCOUNTER
----- Message from Tin Alva DO sent at 7/26/2019  8:25 AM CDT -----  Denis Pallas Larae Deed  labs looked very good lipids were  Excellent kidney, liver function, blood sugar is still creeping up a bit,  So I'd like to add something to her medicatio

## 2019-07-26 NOTE — TELEPHONE ENCOUNTER
----- Message from Max Mcbride DO sent at 7/26/2019  8:27 AM CDT -----  OK forget the [place] and Param, there;s a chance for allergic issues based on her previous allergies, I just want her to work on her weight and try and get some exercise, which I know is gabriela

## 2019-07-26 NOTE — TELEPHONE ENCOUNTER
Called the pt  gave her the lab results- she v/u she asks about her iron level as if it is low it will make her restless leg worse.  Per DS the iron level is normal  please add cbc and have pt CPM-recheck in 3 months with A1C and lipid    The pt states that

## 2019-07-30 RX ORDER — FERROUS SULFATE 325(65) MG
325 TABLET ORAL 2 TIMES DAILY
Qty: 180 TABLET | Refills: 1 | Status: SHIPPED | OUTPATIENT
Start: 2019-07-30 | End: 2020-03-23

## 2019-07-30 NOTE — TELEPHONE ENCOUNTER
LOV: 7/23/19   Last Refill: 5/14/18  #180 1 RF    Future Appointments   Date Time Provider Pam Littlejohn   8/14/2019  7:20 AM Jacquelyn Steele MD MMO NP ORTHO Melyssa

## 2019-08-06 RX ORDER — LIOTHYRONINE SODIUM 25 UG/1
25 TABLET ORAL DAILY
Qty: 90 TABLET | Refills: 2 | Status: SHIPPED | OUTPATIENT
Start: 2019-08-06 | End: 2019-09-19

## 2019-08-06 NOTE — TELEPHONE ENCOUNTER
LOV: 7/23/19  Last Refill:5/13/19 #90 0 RF    Future Appointments   Date Time Provider Pam Littlejohn   8/14/2019  7:20 AM Ronnie Kasper MD MMO NP MATT Nelson     Last TSH: 1.050 7/24/19

## 2019-09-19 RX ORDER — LIOTHYRONINE SODIUM 25 UG/1
25 TABLET ORAL DAILY
Qty: 90 TABLET | Refills: 2 | Status: SHIPPED | OUTPATIENT
Start: 2019-09-19 | End: 2020-04-23

## 2019-09-19 NOTE — TELEPHONE ENCOUNTER
Sp called, pt needs refill on Liothyronine Sodium 25 MCG Oral Tab, [facility] mail order.    Please call spouse at [phone]

## 2019-09-19 NOTE — TELEPHONE ENCOUNTER
LOV: 7/23/19   Last Refill: 8/6/19 * went to local pharmacy -wants it to go to mail order    Future Appointments   Date Time Provider Pam Littlejohn   10/14/2019  7:00 AM Marline Villegas MD MMO NP ORTHO [place]

## 2019-10-18 ENCOUNTER — TELEPHONE (OUTPATIENT)
Dept: FAMILY MEDICINE CLINIC | Facility: CLINIC | Age: 59
End: 2019-10-18

## 2019-10-30 ENCOUNTER — NURSE ONLY (OUTPATIENT)
Dept: FAMILY MEDICINE CLINIC | Facility: CLINIC | Age: 59
End: 2019-10-30

## 2019-10-30 DIAGNOSIS — E78.00 PURE HYPERCHOLESTEROLEMIA: ICD-10-CM

## 2019-10-30 DIAGNOSIS — D50.8 OTHER IRON DEFICIENCY ANEMIA: ICD-10-CM

## 2019-10-30 DIAGNOSIS — E11.9 CONTROLLED TYPE 2 DIABETES MELLITUS WITHOUT COMPLICATION, WITHOUT LONG-TERM CURRENT USE OF INSULIN (HCC): ICD-10-CM

## 2019-10-30 PROCEDURE — 90686 IIV4 VACC NO PRSV 0.5 ML IM: CPT | Performed by: FAMILY MEDICINE

## 2019-10-30 PROCEDURE — 82728 ASSAY OF FERRITIN: CPT | Performed by: FAMILY MEDICINE

## 2019-10-30 PROCEDURE — 85027 COMPLETE CBC AUTOMATED: CPT | Performed by: FAMILY MEDICINE

## 2019-10-30 PROCEDURE — 80061 LIPID PANEL: CPT | Performed by: FAMILY MEDICINE

## 2019-10-30 PROCEDURE — 90471 IMMUNIZATION ADMIN: CPT | Performed by: FAMILY MEDICINE

## 2019-10-30 PROCEDURE — 83036 HEMOGLOBIN GLYCOSYLATED A1C: CPT | Performed by: FAMILY MEDICINE

## 2019-10-30 NOTE — PROGRESS NOTES
Pt was in office for labs per DS    1 mint and 1 lavender tube collected from R AC using butterfly needle and 1 attempt    Pt tolerated and was sent home in stable condition    Pt also received flu vaccination in R arm- pt tolerated and was sent home in

## 2019-11-01 ENCOUNTER — TELEPHONE (OUTPATIENT)
Dept: FAMILY MEDICINE CLINIC | Facility: CLINIC | Age: 59
End: 2019-11-01

## 2019-11-01 DIAGNOSIS — D64.9 ANEMIA, UNSPECIFIED TYPE: Primary | ICD-10-CM

## 2019-11-01 NOTE — TELEPHONE ENCOUNTER
Notes recorded by Deejay Monroe DO on 11/1/2019 at 8:38 AM CDT  Can notify Adriel Kingsley her labs all look very good except she is once again anemic? I know she saw Dr. Ulysees Kayser in 2017 and had a scope done, is she still taking the iron supplements?  If so then She is

## 2019-11-05 RX ORDER — LISINOPRIL 10 MG/1
10 TABLET ORAL
Qty: 90 TABLET | Refills: 3 | Status: SHIPPED | OUTPATIENT
Start: 2019-11-05 | End: 2020-09-15

## 2019-11-05 RX ORDER — METFORMIN HYDROCHLORIDE 500 MG/1
TABLET, EXTENDED RELEASE ORAL
Qty: 360 TABLET | Refills: 3 | Status: SHIPPED | OUTPATIENT
Start: 2019-11-05 | End: 2020-09-15

## 2019-11-05 NOTE — TELEPHONE ENCOUNTER
LOV: 7/23/19   Last Refill:    Lisinopril 9/17/18  #90 3 RFf  Metofromin 10/9/18 #360 3RF  Rosiglitazone  9/17/18 #180 3 RF    [facility]    No future appointments.     Spoke with  and advised to check veggis/fruits in the freezer a

## 2019-11-05 NOTE — TELEPHONE ENCOUNTER
lisinopril 10 MG Oral Tab    METFORMIN HCL  MG Oral Tablet 24 Hr    Rosiglitazone Maleate 4 MG Oral Tab ()      [facility]      Also  was asking if his wife should be concerned about the [employer] recall on frozen veggies, [employer] states t

## 2020-01-06 ENCOUNTER — TELEPHONE (OUTPATIENT)
Dept: FAMILY MEDICINE CLINIC | Facility: CLINIC | Age: 60
End: 2020-01-06

## 2020-01-06 RX ORDER — ONDANSETRON 4 MG/1
4 TABLET, FILM COATED ORAL EVERY 8 HOURS PRN
Qty: 20 TABLET | Refills: 2 | Status: SHIPPED | OUTPATIENT
Start: 2020-01-06 | End: 2020-05-20

## 2020-01-06 NOTE — TELEPHONE ENCOUNTER
Sent Zofran 4 mg tabs po q4-6 prn, Bread, bananas, rice apple sauce and toast no dairy no fatty or fried foods, no fresh fruits or vegetables, can use some OTC florastor or align, and gatorade to rehydrate

## 2020-01-06 NOTE — TELEPHONE ENCOUNTER
Per DS can call in Zofran and pt can follow BRAT diet- need to know what pharmacy to send Zofran to.     Pt  advised please send Zofran to [facility] in [place]

## 2020-01-06 NOTE — TELEPHONE ENCOUNTER
called wife has the stomach flu. She has been throwing up, has diarrhea, and fever. She cant keep anything in her system. This has been going on since Saturday. She has been eating bland food and trying to stay hydrated.  Please advise and call back

## 2020-01-07 ENCOUNTER — TELEPHONE (OUTPATIENT)
Dept: FAMILY MEDICINE CLINIC | Facility: CLINIC | Age: 60
End: 2020-01-07

## 2020-01-07 NOTE — TELEPHONE ENCOUNTER
Letter mailed to patient reminding her she is due for labs.   Lab Frequency Next Occurrence   FERRITIN Once 01/01/2020   CBC WITH DIFFERENTIAL WITH PLATELET Once 36/36/9922

## 2020-01-09 ENCOUNTER — TELEPHONE (OUTPATIENT)
Dept: FAMILY MEDICINE CLINIC | Facility: CLINIC | Age: 60
End: 2020-01-09

## 2020-01-09 NOTE — TELEPHONE ENCOUNTER
Pt is going back to work- could Dr. Calvin Patino please write pt a note excusing her from work this past week and for her to return this coming [address] [facility]

## 2020-01-20 ENCOUNTER — NURSE ONLY (OUTPATIENT)
Dept: FAMILY MEDICINE CLINIC | Facility: CLINIC | Age: 60
End: 2020-01-20

## 2020-01-20 DIAGNOSIS — D64.9 ANEMIA, UNSPECIFIED TYPE: ICD-10-CM

## 2020-01-20 LAB
BASOPHILS # BLD AUTO: 0.04 X10(3) UL (ref 0–0.2)
BASOPHILS NFR BLD AUTO: 0.7 %
DEPRECATED HBV CORE AB SER IA-ACNC: 75.4 NG/ML (ref 18–340)
DEPRECATED RDW RBC AUTO: 48.1 FL (ref 35.1–46.3)
EOSINOPHIL # BLD AUTO: 0.2 X10(3) UL (ref 0–0.7)
EOSINOPHIL NFR BLD AUTO: 3.3 %
ERYTHROCYTE [DISTWIDTH] IN BLOOD BY AUTOMATED COUNT: 13.4 % (ref 11–15)
HCT VFR BLD AUTO: 35.1 % (ref 35–48)
HGB BLD-MCNC: 11.2 G/DL (ref 12–16)
IMM GRANULOCYTES # BLD AUTO: 0.01 X10(3) UL (ref 0–1)
IMM GRANULOCYTES NFR BLD: 0.2 %
LYMPHOCYTES # BLD AUTO: 1.86 X10(3) UL (ref 1–4)
LYMPHOCYTES NFR BLD AUTO: 30.7 %
MCH RBC QN AUTO: 31 PG (ref 26–34)
MCHC RBC AUTO-ENTMCNC: 31.9 G/DL (ref 31–37)
MCV RBC AUTO: 97.2 FL (ref 80–100)
MONOCYTES # BLD AUTO: 0.51 X10(3) UL (ref 0.1–1)
MONOCYTES NFR BLD AUTO: 8.4 %
NEUTROPHILS # BLD AUTO: 3.44 X10 (3) UL (ref 1.5–7.7)
NEUTROPHILS # BLD AUTO: 3.44 X10(3) UL (ref 1.5–7.7)
NEUTROPHILS NFR BLD AUTO: 56.7 %
PLATELET # BLD AUTO: 298 10(3)UL (ref 150–450)
RBC # BLD AUTO: 3.61 X10(6)UL (ref 3.8–5.3)
WBC # BLD AUTO: 6.1 X10(3) UL (ref 4–11)

## 2020-01-20 PROCEDURE — 82728 ASSAY OF FERRITIN: CPT | Performed by: FAMILY MEDICINE

## 2020-01-20 PROCEDURE — 85025 COMPLETE CBC W/AUTO DIFF WBC: CPT | Performed by: FAMILY MEDICINE

## 2020-01-20 NOTE — PROGRESS NOTES
Pt was in office for NV for labs per DS    1 mint and 1 lavender tube collected from [facility] using butterfly needle and 1 attempt    Pt tolerated and was sent home in stable condition

## 2020-01-21 ENCOUNTER — TELEPHONE (OUTPATIENT)
Dept: FAMILY MEDICINE CLINIC | Facility: CLINIC | Age: 60
End: 2020-01-21

## 2020-01-21 DIAGNOSIS — D50.9 IRON DEFICIENCY ANEMIA, UNSPECIFIED IRON DEFICIENCY ANEMIA TYPE: Primary | ICD-10-CM

## 2020-01-21 NOTE — TELEPHONE ENCOUNTER
Pt was advised of results- verbalized understanding    Pt states mom has the same issue and she has to have iron infusions done- Rn advised if that is a route to go down then hematology  Needs to make the call.     Pt states she will call to schedule

## 2020-01-21 NOTE — TELEPHONE ENCOUNTER
----- Message from Marlo Yi DO sent at 1/21/2020  8:05 AM CST -----  Can notify Donald Lopezaster that her blood count is slightly better, her iron stores have recovered a bit, but I can;t figure out why she would still be anemic given she is taking supplements a

## 2020-03-02 ENCOUNTER — TELEPHONE (OUTPATIENT)
Dept: FAMILY MEDICINE CLINIC | Facility: CLINIC | Age: 60
End: 2020-03-02

## 2020-03-02 DIAGNOSIS — E11.9 TYPE 2 DIABETES MELLITUS WITHOUT COMPLICATION, WITHOUT LONG-TERM CURRENT USE OF INSULIN (HCC): ICD-10-CM

## 2020-03-02 DIAGNOSIS — E11.9 CONTROLLED TYPE 2 DIABETES MELLITUS WITHOUT COMPLICATION, WITHOUT LONG-TERM CURRENT USE OF INSULIN (HCC): Primary | ICD-10-CM

## 2020-03-02 NOTE — TELEPHONE ENCOUNTER
Letter mailed to patient reminding her she is due for labs.     Lab Frequency Next Occurrence   LIPID PANEL Once 03/02/2020   HEMOGLOBIN A1C Once 03/02/2020

## 2020-03-23 RX ORDER — PNV NO.95/FERROUS FUM/FOLIC AC 28MG-0.8MG
TABLET ORAL
Qty: 180 TABLET | Refills: 0 | Status: SHIPPED | OUTPATIENT
Start: 2020-03-23 | End: 2020-07-07

## 2020-03-23 NOTE — TELEPHONE ENCOUNTER
No refill protocol    Last refilled on 7/30/19 for # 180 with 1 refills  Last ferritin, CBC 1/20/20  Last OV 7/23/19 for routine health  No future appointments. Thank you.

## 2020-04-23 ENCOUNTER — TELEPHONE (OUTPATIENT)
Dept: FAMILY MEDICINE CLINIC | Facility: CLINIC | Age: 60
End: 2020-04-23

## 2020-04-23 RX ORDER — LIOTHYRONINE SODIUM 25 UG/1
25 TABLET ORAL DAILY
Qty: 90 TABLET | Refills: 0 | Status: SHIPPED | OUTPATIENT
Start: 2020-04-23 | End: 2020-06-04

## 2020-04-23 NOTE — TELEPHONE ENCOUNTER
Last OV 7/23/19  Last lab 7/24/19  TSH 1.050   Last refilled 9/19/19  #90  2 refills     Refilled x 3 months    Patient  notified and verbalized understanding.

## 2020-04-23 NOTE — TELEPHONE ENCOUNTER
called Pt needs refill of   LIOTHYRONINE SODIUM 25 MG    Would like sent to   [facility] [address], [facility] [phone], [phone]

## 2020-05-12 ENCOUNTER — TELEPHONE (OUTPATIENT)
Dept: FAMILY MEDICINE CLINIC | Facility: CLINIC | Age: 60
End: 2020-05-12

## 2020-05-12 DIAGNOSIS — D50.9 IRON DEFICIENCY ANEMIA, UNSPECIFIED IRON DEFICIENCY ANEMIA TYPE: Primary | ICD-10-CM

## 2020-05-12 NOTE — TELEPHONE ENCOUNTER
Routing to provider- pt is requesting lab order for Ferritin    Last Ferritin was 1/20/20  75.4    Future Appointments   Date Time Provider Pam Littlejohn   5/19/2020  8:00 AM REF YK PATTI PRAC REF [facility] None

## 2020-05-12 NOTE — TELEPHONE ENCOUNTER
Patient called to schedule a nurse visit for next week. She would like Dr. Óscar Lo to add on a ferritin level. No need to all back unless you have questions.

## 2020-05-19 ENCOUNTER — LABORATORY ENCOUNTER (OUTPATIENT)
Dept: LAB | Age: 60
End: 2020-05-19
Attending: FAMILY MEDICINE
Payer: COMMERCIAL

## 2020-05-19 DIAGNOSIS — D50.9 IRON DEFICIENCY ANEMIA, UNSPECIFIED IRON DEFICIENCY ANEMIA TYPE: ICD-10-CM

## 2020-05-19 DIAGNOSIS — E11.9 TYPE 2 DIABETES MELLITUS WITHOUT COMPLICATION, WITHOUT LONG-TERM CURRENT USE OF INSULIN (HCC): ICD-10-CM

## 2020-05-19 PROCEDURE — 85025 COMPLETE CBC W/AUTO DIFF WBC: CPT

## 2020-05-19 PROCEDURE — 36415 COLL VENOUS BLD VENIPUNCTURE: CPT

## 2020-05-19 PROCEDURE — 82728 ASSAY OF FERRITIN: CPT

## 2020-05-19 PROCEDURE — 83036 HEMOGLOBIN GLYCOSYLATED A1C: CPT

## 2020-05-19 PROCEDURE — 80061 LIPID PANEL: CPT

## 2020-05-20 ENCOUNTER — TELEPHONE (OUTPATIENT)
Dept: FAMILY MEDICINE CLINIC | Facility: CLINIC | Age: 60
End: 2020-05-20

## 2020-05-20 DIAGNOSIS — E78.2 MIXED HYPERLIPIDEMIA: ICD-10-CM

## 2020-05-20 DIAGNOSIS — D50.9 IRON DEFICIENCY ANEMIA, UNSPECIFIED IRON DEFICIENCY ANEMIA TYPE: ICD-10-CM

## 2020-05-20 DIAGNOSIS — E11.9 NON-INSULIN TREATED TYPE 2 DIABETES MELLITUS (HCC): Primary | ICD-10-CM

## 2020-05-20 RX ORDER — ROSIGLITAZONE MALEATE 4 MG
TABLET ORAL
COMMUNITY
Start: 2020-04-15 | End: 2020-09-15

## 2020-05-20 RX ORDER — ATORVASTATIN CALCIUM 10 MG/1
10 TABLET, FILM COATED ORAL NIGHTLY
Qty: 30 TABLET | Refills: 5 | Status: SHIPPED | OUTPATIENT
Start: 2020-05-20 | End: 2020-11-13

## 2020-05-20 NOTE — TELEPHONE ENCOUNTER
----- Message from Berenice Amor, DO sent at 5/20/2020  3:49 PM CDT -----  Can notify Vlad July, really nice job on her BS control she went down 2/10's of a point, her blood count showed improvement as well as her ferritin, but she should conitnue to take an  I

## 2020-06-03 ENCOUNTER — TELEPHONE (OUTPATIENT)
Dept: FAMILY MEDICINE CLINIC | Facility: CLINIC | Age: 60
End: 2020-06-03

## 2020-06-03 ENCOUNTER — OFFICE VISIT (OUTPATIENT)
Dept: FAMILY MEDICINE CLINIC | Facility: CLINIC | Age: 60
End: 2020-06-03

## 2020-06-03 VITALS
WEIGHT: 139 LBS | SYSTOLIC BLOOD PRESSURE: 122 MMHG | BODY MASS INDEX: 28.02 KG/M2 | HEIGHT: 59 IN | TEMPERATURE: 99 F | DIASTOLIC BLOOD PRESSURE: 72 MMHG | RESPIRATION RATE: 12 BRPM | HEART RATE: 78 BPM

## 2020-06-03 DIAGNOSIS — L50.9 URTICARIA: ICD-10-CM
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
## 2020-06-03 PROCEDURE — 99213 OFFICE O/P EST LOW 20 MIN: CPT | Performed by: FAMILY MEDICINE

## 2020-06-03 NOTE — PROGRESS NOTES
Christopher Estevez is a 62year old female. HPI:   Sabas Murphy noted that she developed a rash on her legs and also on her  Arm in the last 2 days was concerned that this was an alleregicc reaction to her cholesterol med.  She has not been gardening and has not Hypertension    • Insomnia    • Varicose veins       Social History:  Social History    Tobacco Use      Smoking status: Never Smoker      Smokeless tobacco: Never Used    Alcohol use: No      Alcohol/week: 0.0 standard drinks    Drug use: No       REVIEW

## 2020-06-03 NOTE — TELEPHONE ENCOUNTER
PT CALLED AND ADV PT STARTED NEW MEDS ABOUT A WEEK AGO:    atorvastatin 10 MG Oral Tab      PT HAS NOTICED THAT SHE HAS STARTED OUT WITH SOME BLISTERY HIVE LIKE BUMPS. STARTED OUT ON KNEE, HAS SOME ON ANKLES, AND NOW ON ARM.     LOOKING FOR RECOMMENDATIONS

## 2020-06-04 RX ORDER — LIOTHYRONINE SODIUM 25 UG/1
25 TABLET ORAL DAILY
Qty: 90 TABLET | Refills: 2 | Status: SHIPPED | OUTPATIENT
Start: 2020-06-04 | End: 2021-02-10

## 2020-06-04 NOTE — TELEPHONE ENCOUNTER
[address], [facility] [phone], [phone]    Liothyronine Sodium 25 MCG Oral Tab

## 2020-06-05 ENCOUNTER — TELEPHONE (OUTPATIENT)
Dept: FAMILY MEDICINE CLINIC | Facility: CLINIC | Age: 60
End: 2020-06-05

## 2020-06-05 DIAGNOSIS — E11.9 TYPE 2 DIABETES MELLITUS WITHOUT COMPLICATION, WITHOUT LONG-TERM CURRENT USE OF INSULIN (HCC): Primary | ICD-10-CM

## 2020-06-05 NOTE — TELEPHONE ENCOUNTER
PT CALLED AND IS REQUESTING REFERRAL FOR OPHTHALMOLOGY DR Fede Suarez.      PT TRYING TO SCHEDULE APT

## 2020-06-17 ENCOUNTER — TELEPHONE (OUTPATIENT)
Dept: FAMILY MEDICINE CLINIC | Facility: CLINIC | Age: 60
End: 2020-06-17

## 2020-06-17 ENCOUNTER — HOSPITAL ENCOUNTER (OUTPATIENT)
Dept: MAMMOGRAPHY | Age: 60
Discharge: HOME | End: 2020-06-17
Attending: FAMILY MEDICINE
Payer: COMMERCIAL

## 2020-06-17 DIAGNOSIS — Z12.31 VISIT FOR SCREENING MAMMOGRAM: ICD-10-CM

## 2020-06-17 DIAGNOSIS — Z12.39 BREAST CANCER SCREENING: Primary | ICD-10-CM

## 2020-06-17 DIAGNOSIS — Z12.39 BREAST CANCER SCREENING: ICD-10-CM

## 2020-06-17 PROCEDURE — 77067 SCR MAMMO BI INCL CAD: CPT | Performed by: FAMILY MEDICINE

## 2020-06-17 PROCEDURE — 77063 BREAST TOMOSYNTHESIS BI: CPT | Performed by: FAMILY MEDICINE

## 2020-06-17 NOTE — TELEPHONE ENCOUNTER
She does not need a 2D-3D her last one was normal and she is to return to regular screening and she could have it done here

## 2020-06-17 NOTE — TELEPHONE ENCOUNTER
Pt was advised order was placed- pt states in the past she always got the 2D/3D because she always has to go back for additional views

## 2020-06-17 NOTE — TELEPHONE ENCOUNTER
Pt called needs a referral placed to get a mammogram. Pt is getting it done today in [place]. Pt is also wanting to know why they are telling her that is a self order? She thought the Dr was the one who ordered it for her?

## 2020-06-23 RX ORDER — OMEPRAZOLE 20 MG/1
CAPSULE, DELAYED RELEASE ORAL
Qty: 180 CAPSULE | Refills: 3 | Status: SHIPPED | OUTPATIENT
Start: 2020-06-23 | End: 2021-05-25

## 2020-06-23 NOTE — TELEPHONE ENCOUNTER
Pt needs a refill of the   omeprazole 20 MG Oral Capsule Delayed Release     [facility]    Also Spouse would like to talk to nurse about a letter they received from the insurance company  Please return call to [phone]

## 2020-06-26 ENCOUNTER — HOSPITAL ENCOUNTER (OUTPATIENT)
Dept: MAMMOGRAPHY | Age: 60
Discharge: HOME | End: 2020-06-26
Attending: FAMILY MEDICINE
Payer: COMMERCIAL

## 2020-06-26 DIAGNOSIS — R92.2 INCONCLUSIVE MAMMOGRAM: ICD-10-CM

## 2020-06-26 PROCEDURE — 77065 DX MAMMO INCL CAD UNI: CPT | Performed by: FAMILY MEDICINE

## 2020-06-26 PROCEDURE — 77061 BREAST TOMOSYNTHESIS UNI: CPT | Performed by: FAMILY MEDICINE

## 2020-07-07 RX ORDER — PNV NO.95/FERROUS FUM/FOLIC AC 28MG-0.8MG
1 TABLET ORAL 2 TIMES DAILY
Qty: 180 TABLET | Refills: 1 | Status: SHIPPED | OUTPATIENT
Start: 2020-07-07 | End: 2021-12-03

## 2020-07-16 ENCOUNTER — TELEPHONE (OUTPATIENT)
Dept: FAMILY MEDICINE CLINIC | Facility: CLINIC | Age: 60
End: 2020-07-16

## 2020-07-16 NOTE — TELEPHONE ENCOUNTER
It can cause muscle camps, it generally does not cause reflux, taking it at night is fine, she doesn't need to stay upright or fast for it.  Her iron can cause GI issues, if she wants to hold it and see what happens that's fine, if her Sx improve, then [facility]

## 2020-07-16 NOTE — TELEPHONE ENCOUNTER
Patient reports she didn't feel right in the very beginning when she started taking atorvastatin. Muscle cramps in legs. States she's never had these before. Having a hard time seeing, but was told is having cataracts. Blurriness.    Feels acid reflux for

## 2020-07-16 NOTE — TELEPHONE ENCOUNTER
Pt believes this medication is the reason she hasn't been feeling her best. Please call back.    atorvastatin 10 MG Oral Tab

## 2020-07-27 NOTE — TELEPHONE ENCOUNTER
Pt was advised of labs- verbalized understanding    Agreeable to starting statin Eye Protection Verbiage: Before proceeding with the stage, a plastic scleral shield was inserted. The globe was anesthetized with a few drops of 1% lidocaine with 1:100,000 epinephrine. Then, an appropriate sized scleral shield was chosen and coated with lacrilube ointment. The shield was gently inserted and left in place for the duration of each stage. After the stage was completed, the shield was gently removed.

## 2020-07-28 ENCOUNTER — NURSE ONLY (OUTPATIENT)
Dept: FAMILY MEDICINE CLINIC | Facility: CLINIC | Age: 60
End: 2020-07-28

## 2020-07-28 ENCOUNTER — TELEPHONE (OUTPATIENT)
Dept: FAMILY MEDICINE CLINIC | Facility: CLINIC | Age: 60
End: 2020-07-28

## 2020-07-28 DIAGNOSIS — R30.0 DYSURIA: Primary | ICD-10-CM

## 2020-07-28 PROCEDURE — 87086 URINE CULTURE/COLONY COUNT: CPT | Performed by: FAMILY MEDICINE

## 2020-07-28 PROCEDURE — 87077 CULTURE AEROBIC IDENTIFY: CPT | Performed by: FAMILY MEDICINE

## 2020-07-28 PROCEDURE — 87186 SC STD MICRODIL/AGAR DIL: CPT | Performed by: FAMILY MEDICINE

## 2020-07-28 NOTE — TELEPHONE ENCOUNTER
Pt called she just got back from vacation yesterday. On Sunday she was symptoms of a UTI, she had blood in urine, urinating frequently, and pressure when going to the bathroom. She took azo's, tylenol, and cranberry juice.  That helped but then the symptoms

## 2020-07-28 NOTE — PROGRESS NOTES
Pt was in office for NV to provide urine for testing    Pt has been taking Axo- so no in office dip was preformed    Pt tolerated and was sent home in stable condition

## 2020-07-28 NOTE — TELEPHONE ENCOUNTER
Per conversation with DR. Pitt pt can bring in urine to test    Future Appointments   Date Time Provider Pam Littlejohn   7/28/2020 11:00 AM  [facility] [address] EMG Hector Bones

## 2020-07-31 ENCOUNTER — TELEPHONE (OUTPATIENT)
Dept: FAMILY MEDICINE CLINIC | Facility: CLINIC | Age: 60
End: 2020-07-31

## 2020-07-31 RX ORDER — NITROFURANTOIN 25; 75 MG/1; MG/1
100 CAPSULE ORAL 2 TIMES DAILY
Qty: 10 CAPSULE | Refills: 0 | Status: SHIPPED | OUTPATIENT
Start: 2020-07-31 | End: 2020-08-05

## 2020-07-31 RX ORDER — FLUCONAZOLE 100 MG/1
100 TABLET ORAL DAILY
Qty: 5 TABLET | Refills: 0 | Status: SHIPPED | OUTPATIENT
Start: 2020-07-31 | End: 2020-08-05

## 2020-07-31 NOTE — TELEPHONE ENCOUNTER
Patient notified and verbalized understanding. States every time she takes antibiotic she gets a yeast infection.   Would like to know if DS will send script for diflucan as well

## 2020-07-31 NOTE — TELEPHONE ENCOUNTER
Ismael Rainey, DO  7/31/2020  8:29 AM      Can notify [place] July that her urine culture came back positive, will send in Macrobid bid for 5 days        Left message on voicemail/answering machine for patient to call office

## 2020-08-06 ENCOUNTER — TELEPHONE (OUTPATIENT)
Dept: FAMILY MEDICINE CLINIC | Facility: CLINIC | Age: 60
End: 2020-08-06

## 2020-08-06 NOTE — TELEPHONE ENCOUNTER
Spouse called, states he called last week for a referral for pt for Dr. Austin Boyer. Pt has an appt scheduled for 8/11 w/ Dr. Austin Boyer. No referral or phone note in system.   Please call spouse at [phone]

## 2020-08-08 ENCOUNTER — TELEPHONE (OUTPATIENT)
Dept: FAMILY MEDICINE CLINIC | Facility: CLINIC | Age: 60
End: 2020-08-08

## 2020-08-08 DIAGNOSIS — Z20.822 EXPOSURE TO COVID-19 VIRUS: Primary | ICD-10-CM

## 2020-08-08 DIAGNOSIS — R09.82 PND (POST-NASAL DRIP): ICD-10-CM

## 2020-08-08 NOTE — TELEPHONE ENCOUNTER
Order placed fro COVID test. She should wait until Monday to do it, if she does it too soon after exposure, it might not be accurate. She needs to stay home from work, cancel eye appt and quarantine at home until she knows the test result.

## 2020-08-08 NOTE — TELEPHONE ENCOUNTER
She was exposed to a co worker you is covid positive  She only has issues with Phlegm no fever, She is having vision issues  She needs advise.

## 2020-08-08 NOTE — TELEPHONE ENCOUNTER
Person at work tested positive on Thursday. Pt has not been in the same room with COVID pt. She does go in and out of rooms that COVID pt has been in. Pt denies fever, but she does have some phlegm in her throat.   She states she also suffers from se

## 2020-08-10 ENCOUNTER — TELEPHONE (OUTPATIENT)
Dept: FAMILY MEDICINE CLINIC | Facility: CLINIC | Age: 60
End: 2020-08-10

## 2020-08-10 ENCOUNTER — LAB ENCOUNTER (OUTPATIENT)
Dept: LAB | Facility: HOSPITAL | Age: 60
End: 2020-08-10
Attending: FAMILY MEDICINE
Payer: COMMERCIAL

## 2020-08-10 DIAGNOSIS — Z20.822 EXPOSURE TO COVID-19 VIRUS: ICD-10-CM

## 2020-08-10 DIAGNOSIS — R09.82 PND (POST-NASAL DRIP): ICD-10-CM

## 2020-08-10 NOTE — TELEPHONE ENCOUNTER
PT CALLED AND ADV THAT SHE NEEDS REFERRAL FOR COVID TEST. PT GETTING IT DONE AT 10:30 TODAY.     PLEASE ADV    THANK YOU

## 2020-08-10 NOTE — TELEPHONE ENCOUNTER
Per verbal conversation with DS    No referral is needed for lab testing- only an order    COVID testing would fall under lab work. RN advised pt that no referral is needed- an order is placed and pt is going to [facility] facility to hav testing done.     RN alcides

## 2020-08-11 ENCOUNTER — TELEPHONE (OUTPATIENT)
Dept: FAMILY MEDICINE CLINIC | Facility: CLINIC | Age: 60
End: 2020-08-11

## 2020-08-11 NOTE — TELEPHONE ENCOUNTER
Pt had the covid testing done yesterday. She was advised to call our office and ask to be called when results are in. She was told that if the test is negative that pt are not getting called. She was told this by the lady who did the test on her.  Alberto pa

## 2020-08-12 LAB — SARS-COV-2 RNA RESP QL NAA+PROBE: NOT DETECTED

## 2020-08-18 ENCOUNTER — TELEPHONE (OUTPATIENT)
Dept: FAMILY MEDICINE CLINIC | Facility: CLINIC | Age: 60
End: 2020-08-18

## 2020-08-18 NOTE — TELEPHONE ENCOUNTER
Pt's  called they had to cancel an eye appointment as she had to get tested for covid. They were advised they needed to get new referral to see the dr. Jeane Tracy is going to see the eye dr 10/27.  Pt is wanting to know if thye need  A new referral?

## 2020-09-15 RX ORDER — METFORMIN HYDROCHLORIDE 500 MG/1
TABLET, EXTENDED RELEASE ORAL
Qty: 360 TABLET | Refills: 3 | Status: SHIPPED | OUTPATIENT
Start: 2020-09-15 | End: 2021-09-27

## 2020-09-15 RX ORDER — PIOGLITAZONEHYDROCHLORIDE 15 MG/1
30 TABLET ORAL DAILY
Qty: 90 TABLET | Refills: 2 | Status: SHIPPED | OUTPATIENT
Start: 2020-09-15 | End: 2020-09-21 | Stop reason: SINTOL

## 2020-09-15 RX ORDER — LISINOPRIL 10 MG/1
10 TABLET ORAL
Qty: 90 TABLET | Refills: 3 | Status: SHIPPED | OUTPATIENT
Start: 2020-09-15

## 2020-09-15 NOTE — TELEPHONE ENCOUNTER
metFORMIN HCl  MG Oral Tablet     Rosiglitazone Maleate 4 MG Oral Tab lisinopril 10 MG Oral Tab [[id number]    [facility]

## 2020-09-15 NOTE — TELEPHONE ENCOUNTER
LOV: 6/3/20   Last Refill:   Metformin 11/5/19   Lisinopril 11/5/19   Rosiglitazone 4/15/20       Last A1c value was 6.7% done 5/19/2020.     BP Readings from Last 2 Encounters:  06/03/20 : 122/72  07/23/19 : 102/78

## 2020-09-21 ENCOUNTER — TELEPHONE (OUTPATIENT)
Dept: FAMILY MEDICINE CLINIC | Facility: CLINIC | Age: 60
End: 2020-09-21

## 2020-09-21 NOTE — TELEPHONE ENCOUNTER
We might need to get a prior auth given her side effect issues and also the Januvia did not work for her

## 2020-09-21 NOTE — TELEPHONE ENCOUNTER
Rosiglitazone was not approved per insurance- Pioglitazone was the preferred drug on the med list.    Spouse states this medication makes her sick- last time she was on it it dropped her blood pressure and she wound up in the ER     states Jeremy sepulveda

## 2020-09-21 NOTE — TELEPHONE ENCOUNTER
Medication sent to pharmacy to [facility]- will let pt know once we have approval     was advised- verbalized understanding

## 2020-09-21 NOTE — TELEPHONE ENCOUNTER
Pt was given Pioglitazone. And its making her sick. Spouse is calling to see if she can get something else.    Please return call to [phone]

## 2020-10-09 ENCOUNTER — OFFICE VISIT (OUTPATIENT)
Dept: FAMILY MEDICINE CLINIC | Facility: CLINIC | Age: 60
End: 2020-10-09

## 2020-10-09 VITALS
WEIGHT: 140 LBS | SYSTOLIC BLOOD PRESSURE: 140 MMHG | HEART RATE: 71 BPM | BODY MASS INDEX: 28 KG/M2 | OXYGEN SATURATION: 99 % | TEMPERATURE: 97 F | RESPIRATION RATE: 18 BRPM | DIASTOLIC BLOOD PRESSURE: 72 MMHG

## 2020-10-09 DIAGNOSIS — Z20.822 SUSPECTED COVID-19 VIRUS INFECTION: Primary | ICD-10-CM

## 2020-10-09 DIAGNOSIS — R09.81 NASAL CONGESTION: ICD-10-CM

## 2020-10-09 DIAGNOSIS — R19.7 DIARRHEA, UNSPECIFIED TYPE: ICD-10-CM

## 2020-10-09 PROCEDURE — 99213 OFFICE O/P EST LOW 20 MIN: CPT | Performed by: NURSE PRACTITIONER

## 2020-10-09 PROCEDURE — 3077F SYST BP >= 140 MM HG: CPT | Performed by: NURSE PRACTITIONER

## 2020-10-09 PROCEDURE — 3078F DIAST BP <80 MM HG: CPT | Performed by: NURSE PRACTITIONER

## 2020-10-09 NOTE — PATIENT INSTRUCTIONS
1. Rest. Drink plenty of fluids. 2. Gentle nose blowing. Supportive care as discussed. 3. [place] diet. Advance as tolerated. 4.Self quarantine at this time per [facility] guidelines. (Until 10 days from onset of symptoms, AND no fever for at least 24hrs, AND a symptoms. Symptoms can also vary from person to person. As experts learn more about COVID-19, other symptoms are being reported.  Symptoms may appear 2 to 14 days after contact with the virus:   · Fever  · Coughing  · Trouble breathing or feeling short of b weeks  · Inflammation in at least 2 organs such as the heart, lungs, or kidneys with lab tests that show inflammation  · No other diagnoses besides COVID-19 explain the child's symptoms  How is COVID-19 diagnosed?   Your healthcare provider will ask about y other conditions are being looked at for COVID-19, but these are not currently approved to treat it. The most proven treatments right now are those to help your body while it fights the virus. This is known as supportive care.  Supportive care may include treatment, but the FDA is looking at it and has asked the [employer] to help with plasma donation and collection. Talk with your provider to learn more about convalescent plasma donation and whether you qualify to donate.    Are you at risk for COVI of someone who has COVID-19 for at least 15 minutes  * You provided care at home to someone who is sick with COVID-19  * You had direct physical contact with the person (touched, hugged, or kissed them)  * You shared eating or drinking utensils  * They sne awaiting test results or are confirmed positive for COVID -19, and your symptoms worsen at home with symptoms such as: extreme weakness, difficult breathing, or unrelenting fevers greater than 100.4 degrees Fahrenheit, you should contact your health care p and would be interested in donating plasma. Convalescent plasma is a component of blood that, in people who have recovered from COVID-19, contains antibodies against the virus.  The antibodies in plasma can be used as a treatment for patients in our comm happens when you have loose, watery, or frequent bowel movements. It is a common problem with many causes. Most cases of diarrhea clear up on their own. But certain cases may need treatment.  Be sure to see your healthcare provider if your symptoms don't ge healthcare professional's instructions. Adult Self-Care for Colds  Colds are caused by viruses. They can't be cured with antibiotics. But you can ease symptoms and support your body's efforts to heal itself.  No matter which symptoms you have, be heike healthcare provider if there are any foods you should not have. When to call your healthcare provider  When you first notice symptoms, ask your provider if you should take antiviral medicines. Antibiotics should not be taken for colds or flu.  Also call

## 2020-10-09 NOTE — PROGRESS NOTES
CHIEF COMPLAINT:   Patient presents with:  Nasal Congestion  Diarrhea  Other: covid exposure      HPI:   Hyacinth Pitts is a 56year old female who presents for concern for possible covd-19.  She reports she was at a wedding on 10/3 and received a call • NON FORMULARY Cinnamon 2 times a day     • Calcium Carbonate-Vitamin D (CALTRATE 600+D OR) Take  by mouth.         Past Medical History:   Diagnosis Date   • Diabetes ([facility] [address]    • DM type 2 (diabetes mellitus, type 2) (HCC)    • Esophageal reflux    • H. py NECK: Supple, non-tender  LUNGS: clear to auscultation bilaterally, no rales, wheezes or rhonchi. Breathing is non labored. CARDIO: RRR without murmur  GI: soft, non distended. No visible peristalsis. No masses. No organomegaly.  No tenderness to palpation 6. Follow up with PMD as needed. Go to the emergency department immediately if symptoms worsen, change, you develop chest discomfort, wheezing, shortness of breath, or if you have any concerns.       Coronavirus Disease 2019 (COVID-19): Overview  Coronaviru · Chills or repeated shaking with chills  · Fatigue  · Loss of appetite  · Nausea, vomiting, diarrhea, or abdominal pain  · Loss of sense of smell and taste  You can check your symptoms with the CDC’s Coronavirus Self-.    What are possible complicat Your healthcare provider will ask about your symptoms. He or she will ask where you live, and about your recent travel, and any contact with sick people.  If your healthcare provider thinks you may have COVID-19, he or she will consider whether to test you The most proven treatments right now are those to help your body while it fights the virus. This is known as supportive care. Supportive care may include:   · Getting rest.  This helps your body fight the illness. · Staying hydrated.   Drinking liquids is People who have had COVID-19 and are fully recovered may be asked by their healthcare team to consider donating plasma. This is called COVID-19 convalescent plasma donation.  Plasma from people fully recovered from COVID-19 may contain antibodies to help fi There is no specific antiviral treatment recommended for COVID-19. People with COVID-19 should receive supportive care to help relieve symptoms.       Anyone who has been in close contact with someone who has COVID-19 should quarantine for at least 14 days 8. As much as possible, stay in a specific room and away from other people in your home. Also, you should use a separate bathroom, if available. If you need to be around other people in or outside of the home, wear a facemask.    9. Avoid sharing personal i If you have a fever with cough or shortness of breath but have not been exposed to someone with COVID-19 and have not tested positive for COVID-19, you should also stay home and away from others for a total of 10 days after your symptoms started, and at [employer] You can also get more information at the following websites:   Centers for Disease Control & Prevention (CDC)  What to do if you are sick with coronavirus disease 2019, [employer]com.pt. pdf · If you were prescribed antibiotics, take them as directed.   · Don't take anti-diarrhea medicines without asking your provider first.  Call your healthcare provider   Call your healthcare provider if you have any of the following:   · A fever of 100.4° F Soothe a sore throat and cough  · Gargle every  2 hours with  1/4 teaspoon of salt dissolved in  1/2 cup of warm water. Suck on throat lozenges and cough drops to moisten your throat. · Cough medicines are available. But it's not clear how well they work. © 2495-4590 [employer] [address] [facility] [place]. All rights reserved. This information is not intended as a substitute for professional medical care. Always follow your healthcare professional's instructions.

## 2020-10-16 ENCOUNTER — IMMUNIZATION (OUTPATIENT)
Dept: FAMILY MEDICINE CLINIC | Facility: CLINIC | Age: 60
End: 2020-10-16

## 2020-10-16 DIAGNOSIS — Z23 NEED FOR VACCINATION: ICD-10-CM

## 2020-10-16 PROCEDURE — 90471 IMMUNIZATION ADMIN: CPT | Performed by: FAMILY MEDICINE

## 2020-10-16 PROCEDURE — 90686 IIV4 VACC NO PRSV 0.5 ML IM: CPT | Performed by: FAMILY MEDICINE

## 2020-10-30 ENCOUNTER — TELEPHONE (OUTPATIENT)
Dept: FAMILY MEDICINE CLINIC | Facility: CLINIC | Age: 60
End: 2020-10-30

## 2020-11-13 ENCOUNTER — OFFICE VISIT (OUTPATIENT)
Dept: FAMILY MEDICINE CLINIC | Facility: CLINIC | Age: 60
End: 2020-11-13

## 2020-11-13 VITALS
HEIGHT: 60 IN | BODY MASS INDEX: 26.31 KG/M2 | HEART RATE: 69 BPM | RESPIRATION RATE: 16 BRPM | OXYGEN SATURATION: 99 % | TEMPERATURE: 98 F | DIASTOLIC BLOOD PRESSURE: 80 MMHG | WEIGHT: 134 LBS | SYSTOLIC BLOOD PRESSURE: 138 MMHG

## 2020-11-13 DIAGNOSIS — Z20.822 SUSPECTED 2019 NOVEL CORONAVIRUS INFECTION: ICD-10-CM

## 2020-11-13 DIAGNOSIS — R09.81 NASAL CONGESTION: Primary | ICD-10-CM

## 2020-11-13 PROCEDURE — 3075F SYST BP GE 130 - 139MM HG: CPT | Performed by: PHYSICIAN ASSISTANT

## 2020-11-13 PROCEDURE — 3079F DIAST BP 80-89 MM HG: CPT | Performed by: PHYSICIAN ASSISTANT

## 2020-11-13 PROCEDURE — 99213 OFFICE O/P EST LOW 20 MIN: CPT | Performed by: PHYSICIAN ASSISTANT

## 2020-11-13 PROCEDURE — 3008F BODY MASS INDEX DOCD: CPT | Performed by: PHYSICIAN ASSISTANT

## 2020-11-13 NOTE — PROGRESS NOTES
CHIEF COMPLAINT:     Patient presents with:  Nasal Congestion: x months. needs covid-19 test for travel      HPI:   Lisa Monroe is a 56year old female who presents with request for covid testing.   She will be driving to [place] to visit grandson a reflux    • H. pylori infection    • Hyperlipidemia    • Hypertension    • Insomnia    • Varicose veins       Social History:  Social History    Tobacco Use      Smoking status: Never Smoker      Smokeless tobacco: Never Used    Alcohol use: No      Alcoho test is positive: Discussed cdc guidelines regarding home isolation to include a minimum of 10 days of isolation from the start of symptoms or date of positive test if symptom onset date is unclear, have general improvement in symptoms, and be fever free f

## 2020-11-13 NOTE — PATIENT INSTRUCTIONS
Coronavirus Disease 2019 (COVID-19)     [facility] is committed to the safety and well-being of our patients, members, employees, and communities.  As concerns arise about the new strain of coronavirus that causes COVID-19, [facility] 4. If you have a medical appointment, call the healthcare provider ahead of time and tell them that you have or may have COVID-19.  5. For medical emergencies, call 911 and notify the dispatch personnel that you have or may have COVID-19.   6. Cover your c · At least 10 days have passed since symptoms first appeared OR if asymptomatic patient or date of symptom onset is unclear then use 10 days post COVID test date.    · At least 20 days have passed for severe illness (requiring hospitalization) OR if you are *Some people will be required to have a repeat COVID-19 test in order to be eligible to donate. If you’re instructed by [facility] that a repeat test is required, please contact the [facility] COVID-19 Nurse Triage Line at [phone].     Additional Inf

## 2020-12-22 ENCOUNTER — TELEPHONE (OUTPATIENT)
Dept: FAMILY MEDICINE CLINIC | Facility: CLINIC | Age: 60
End: 2020-12-22

## 2021-02-10 RX ORDER — LIOTHYRONINE SODIUM 25 UG/1
25 TABLET ORAL DAILY
Qty: 90 TABLET | Refills: 2 | Status: SHIPPED | OUTPATIENT
Start: 2021-02-10 | End: 2021-06-18

## 2021-02-10 NOTE — TELEPHONE ENCOUNTER
LOV: 6/3/20  Last Refill: 6/4/20 #90 2 . No future appointments.     Lab Results   Component Value Date    T4F 0.3 (L) 02/16/2019    TSH 1.050 07/24/2019
SPOUSE CALLED AND ADV THAT PT NEEDS REFILL OF     Liothyronine Sodium 25 MCG Oral Tab    ** THIS NEEDS TO [facility] SENT TO [facility] **     PLEASE CALL IF ANY PROBLEMS    THANK YOU
7

## 2021-03-22 ENCOUNTER — TELEPHONE (OUTPATIENT)
Dept: FAMILY MEDICINE CLINIC | Facility: CLINIC | Age: 61
End: 2021-03-22

## 2021-03-22 DIAGNOSIS — E11.9 TYPE 2 DIABETES MELLITUS WITHOUT COMPLICATION, WITHOUT LONG-TERM CURRENT USE OF INSULIN (HCC): ICD-10-CM

## 2021-03-22 DIAGNOSIS — D50.9 IRON DEFICIENCY ANEMIA, UNSPECIFIED IRON DEFICIENCY ANEMIA TYPE: ICD-10-CM

## 2021-03-22 DIAGNOSIS — E78.00 PURE HYPERCHOLESTEROLEMIA: Primary | ICD-10-CM

## 2021-03-22 NOTE — TELEPHONE ENCOUNTER
----- Message from Tin Alva DO sent at 3/22/2021  2:31 PM CDT -----  Regarding: RE: Labs  Yes please  ----- Message -----  From: Shweta Acuna RN  Sent: 3/22/2021   2:28 PM CDT  To: Tin Alva DO  Subject: Labs

## 2021-03-25 ENCOUNTER — NURSE ONLY (OUTPATIENT)
Dept: FAMILY MEDICINE CLINIC | Facility: CLINIC | Age: 61
End: 2021-03-25

## 2021-03-25 DIAGNOSIS — E78.00 PURE HYPERCHOLESTEROLEMIA: ICD-10-CM

## 2021-03-25 DIAGNOSIS — E11.9 TYPE 2 DIABETES MELLITUS WITHOUT COMPLICATION, WITHOUT LONG-TERM CURRENT USE OF INSULIN (HCC): ICD-10-CM

## 2021-03-25 DIAGNOSIS — D50.9 IRON DEFICIENCY ANEMIA, UNSPECIFIED IRON DEFICIENCY ANEMIA TYPE: ICD-10-CM

## 2021-03-25 LAB
CHOLEST SMN-MCNC: 189 MG/DL (ref ?–200)
CREAT UR-SCNC: <13 MG/DL
DEPRECATED HBV CORE AB SER IA-ACNC: 68.9 NG/ML
DEPRECATED RDW RBC AUTO: 49 FL (ref 35.1–46.3)
ERYTHROCYTE [DISTWIDTH] IN BLOOD BY AUTOMATED COUNT: 13.9 % (ref 11–15)
EST. AVERAGE GLUCOSE BLD GHB EST-MCNC: 151 MG/DL (ref 68–126)
HBA1C MFR BLD HPLC: 6.9 % (ref ?–5.7)
HCT VFR BLD AUTO: 34.3 %
HDLC SERPL-MCNC: 70 MG/DL (ref 40–59)
HGB BLD-MCNC: 11.1 G/DL
LDLC SERPL CALC-MCNC: 105 MG/DL (ref ?–100)
MCH RBC QN AUTO: 30.9 PG (ref 26–34)
MCHC RBC AUTO-ENTMCNC: 32.4 G/DL (ref 31–37)
MCV RBC AUTO: 95.5 FL
MICROALBUMIN UR-MCNC: <0.5 MG/DL
NONHDLC SERPL-MCNC: 119 MG/DL (ref ?–130)
PATIENT FASTING Y/N/NP: YES
PLATELET # BLD AUTO: 276 10(3)UL (ref 150–450)
RBC # BLD AUTO: 3.59 X10(6)UL
TRIGL SERPL-MCNC: 69 MG/DL (ref 30–149)
VLDLC SERPL CALC-MCNC: 14 MG/DL (ref 0–30)
WBC # BLD AUTO: 5.1 X10(3) UL (ref 4–11)

## 2021-03-25 PROCEDURE — 3044F HG A1C LEVEL LT 7.0%: CPT | Performed by: FAMILY MEDICINE

## 2021-03-25 PROCEDURE — 82728 ASSAY OF FERRITIN: CPT | Performed by: FAMILY MEDICINE

## 2021-03-25 PROCEDURE — 80061 LIPID PANEL: CPT | Performed by: FAMILY MEDICINE

## 2021-03-25 PROCEDURE — 82570 ASSAY OF URINE CREATININE: CPT | Performed by: FAMILY MEDICINE

## 2021-03-25 PROCEDURE — 82043 UR ALBUMIN QUANTITATIVE: CPT | Performed by: FAMILY MEDICINE

## 2021-03-25 PROCEDURE — 83036 HEMOGLOBIN GLYCOSYLATED A1C: CPT | Performed by: FAMILY MEDICINE

## 2021-03-25 PROCEDURE — 3061F NEG MICROALBUMINURIA REV: CPT | Performed by: FAMILY MEDICINE

## 2021-03-25 PROCEDURE — 85027 COMPLETE CBC AUTOMATED: CPT | Performed by: FAMILY MEDICINE

## 2021-03-26 ENCOUNTER — TELEPHONE (OUTPATIENT)
Dept: FAMILY MEDICINE CLINIC | Facility: CLINIC | Age: 61
End: 2021-03-26

## 2021-03-26 DIAGNOSIS — E11.9 TYPE 2 DIABETES MELLITUS WITHOUT COMPLICATION, WITHOUT LONG-TERM CURRENT USE OF INSULIN (HCC): ICD-10-CM

## 2021-03-26 DIAGNOSIS — E78.00 PURE HYPERCHOLESTEROLEMIA: ICD-10-CM

## 2021-03-26 DIAGNOSIS — D64.9 ANEMIA, UNSPECIFIED TYPE: Primary | ICD-10-CM

## 2021-03-26 NOTE — TELEPHONE ENCOUNTER
Patient returned call. Lab values for A1c, lipid, ferritin and Hgb given verbally. Patient states she does not follow with Dr Doris Dang. Only saw him once for a scope. Patient also stating she is still having chest pains at work.   States it has happene

## 2021-03-26 NOTE — TELEPHONE ENCOUNTER
Patient notified via detailed voicemail left at cell number (ok per  HIPAA consent)    Future labs ordered   Recall in epic    Labs faxed to Dr Corinne Elkins [phone]

## 2021-03-26 NOTE — TELEPHONE ENCOUNTER
----- Message from Marj Lawson DO sent at 3/26/2021  7:37 AM CDT -----  Can notify Phoebe Mcfarlane, her lipids and her A1c look great, her anemia is stable. Let’s keep everything the same and recall labs in 6 months.  Forward CBC and ferritin to Dr Margaret Blizzard

## 2021-04-12 ENCOUNTER — TELEPHONE (OUTPATIENT)
Dept: FAMILY MEDICINE CLINIC | Facility: CLINIC | Age: 61
End: 2021-04-12

## 2021-04-13 ENCOUNTER — MED REC SCAN ONLY (OUTPATIENT)
Dept: FAMILY MEDICINE CLINIC | Facility: CLINIC | Age: 61
End: 2021-04-13

## 2021-05-11 ENCOUNTER — NURSE ONLY (OUTPATIENT)
Dept: FAMILY MEDICINE CLINIC | Facility: CLINIC | Age: 61
End: 2021-05-11

## 2021-05-11 ENCOUNTER — TELEPHONE (OUTPATIENT)
Dept: FAMILY MEDICINE CLINIC | Facility: CLINIC | Age: 61
End: 2021-05-11

## 2021-05-11 DIAGNOSIS — R39.89 SENSATION OF PRESSURE IN BLADDER AREA: ICD-10-CM

## 2021-05-11 DIAGNOSIS — R30.0 DYSURIA: Primary | ICD-10-CM

## 2021-05-11 DIAGNOSIS — R30.0 DYSURIA: ICD-10-CM

## 2021-05-11 PROCEDURE — 87086 URINE CULTURE/COLONY COUNT: CPT | Performed by: FAMILY MEDICINE

## 2021-05-11 PROCEDURE — 87077 CULTURE AEROBIC IDENTIFY: CPT | Performed by: FAMILY MEDICINE

## 2021-05-11 PROCEDURE — 87186 SC STD MICRODIL/AGAR DIL: CPT | Performed by: FAMILY MEDICINE

## 2021-05-11 NOTE — TELEPHONE ENCOUNTER
Pt called she thinks she might have a UTI. She has pain and blood in her urine. She stopped at the store this morning and bought AZOJohn Lopez PT is  wanting to know if she came in to give a urine sample if that would mess with the results?

## 2021-05-11 NOTE — TELEPHONE ENCOUNTER
Pt advised she is having pressure and pain with urination- she also noticed blood today    Pt took Azo this morning RN advised we can do urine and send for culture    Per verbal okay per DS send for culture    Future Appointments   Date Time Provider Depar

## 2021-05-14 ENCOUNTER — TELEPHONE (OUTPATIENT)
Dept: FAMILY MEDICINE CLINIC | Facility: CLINIC | Age: 61
End: 2021-05-14

## 2021-05-14 ENCOUNTER — MOBILE ENCOUNTER (OUTPATIENT)
Dept: FAMILY MEDICINE CLINIC | Facility: CLINIC | Age: 61
End: 2021-05-14

## 2021-05-14 RX ORDER — NITROFURANTOIN 25; 75 MG/1; MG/1
100 CAPSULE ORAL 2 TIMES DAILY
Qty: 10 CAPSULE | Refills: 0 | Status: SHIPPED | OUTPATIENT
Start: 2021-05-14 | End: 2021-05-19

## 2021-05-14 RX ORDER — FLUCONAZOLE 150 MG/1
TABLET ORAL
Qty: 2 TABLET | Refills: 0 | Status: SHIPPED | OUTPATIENT
Start: 2021-05-14 | End: 2021-05-17

## 2021-05-14 RX ORDER — CIPROFLOXACIN 500 MG/1
500 TABLET, FILM COATED ORAL 2 TIMES DAILY
Qty: 10 TABLET | Refills: 0 | Status: SHIPPED | OUTPATIENT
Start: 2021-05-14 | End: 2021-05-19

## 2021-05-14 NOTE — TELEPHONE ENCOUNTER
Patient advised of Doctor's note below. Pt advised to take Diflucan when symptoms of yeast infection starts. Patient verbalized understanding. No further questions at this time.

## 2021-05-14 NOTE — TELEPHONE ENCOUNTER
Considering she is still symptomatic and has some E. Coli growth will choose to treat with Macrobid 100 mg twice daily X 5 days. May continue taking Azo as needed. Hydrate well.  Will also send in Rx Diflucan 150 mg X 2 doses to be taken 72 hours apart. ~ MM

## 2021-05-14 NOTE — TELEPHONE ENCOUNTER
Patient advised of Doctor's note below. Patient advised to  Cipro and Diflucan prescription. Patient verbalized understanding.      [address] and confirmed cancel order for macrobid, keep cipro and diflucan

## 2021-05-14 NOTE — TELEPHONE ENCOUNTER
See telephone encounter 5/14/21    Routed to Dr. Isaiah Gray    She prescribed pt Nitrofurantoin Monohyd Macro 100 MG Oral Cap  And fluconazole (DIFLUCAN) 150 MG Oral Tab      Patient was notified of the above. Keep cipro or macrobid?

## 2021-05-14 NOTE — TELEPHONE ENCOUNTER
----- Message from Kay Marquez DO sent at 5/14/2021  1:31 PM CDT -----  Can notify Pineda Cheatham, her urine culture did show a UTI, am going to call in Cipro 500 mg po bid for 5 days.

## 2021-05-14 NOTE — TELEPHONE ENCOUNTER
5/11/2021  Urine Culture 10,000 - 50,000 CFU/ML Escherichia coli       No abx in current medication list.    Pt reports she does not have any other symptoms other than blood in urine and pain- describes as pressure, with urination.  Pt denies burning with

## 2021-05-14 NOTE — TELEPHONE ENCOUNTER
PT CALLED AND ADV THAT SHE WOULD LIKE SOME RECOMMENDATIONS ON HER URINE CULTURE. PT ADV SHE LOOKED IT UP AND IS NOT HAVING ANY SX'S EXCEPT   HORRIBLE PRESSURE WHEN GOES TO THE BATHROOM AND SOME BLOOD IN THE URINE.     LOOKING FOR RECOMMENDATIONS       PLEAS

## 2021-05-18 ENCOUNTER — MED REC SCAN ONLY (OUTPATIENT)
Dept: FAMILY MEDICINE CLINIC | Facility: CLINIC | Age: 61
End: 2021-05-18

## 2021-05-21 ENCOUNTER — TELEPHONE (OUTPATIENT)
Dept: FAMILY MEDICINE CLINIC | Facility: CLINIC | Age: 61
End: 2021-05-21

## 2021-05-21 NOTE — TELEPHONE ENCOUNTER
In the same place? That would be really unusual? If it's just itching they can try something topical like lanacaine cream to the site 1-2 times a day, if she breaks out in the blisters again, then we would call in an antiviral for her.  If the itching is in

## 2021-05-21 NOTE — TELEPHONE ENCOUNTER
Pt's  called she thinks that she might have shingles again on her back. She has no pain just itching. Pt is wanting to know if the dr could send something in?     Pt would like sent to [facility]

## 2021-05-21 NOTE — TELEPHONE ENCOUNTER
Patient states that the itching is in the same place. He will try the cream and will call if anything changes.

## 2021-05-25 RX ORDER — OMEPRAZOLE 20 MG/1
CAPSULE, DELAYED RELEASE ORAL
Qty: 180 CAPSULE | Refills: 3 | Status: SHIPPED | OUTPATIENT
Start: 2021-05-25

## 2021-05-25 NOTE — TELEPHONE ENCOUNTER
Dariusz Kwok called to request a refill of the following medication:    omeprazole 20 MG Oral Capsule Delayed Release    [facility] [address] [place] [address], [address] [place] [address] [phone], [phone]

## 2021-06-03 ENCOUNTER — TELEPHONE (OUTPATIENT)
Dept: FAMILY MEDICINE CLINIC | Facility: CLINIC | Age: 61
End: 2021-06-03

## 2021-06-03 RX ORDER — PIOGLITAZONEHYDROCHLORIDE 30 MG/1
30 TABLET ORAL DAILY
Qty: 90 TABLET | Refills: 2 | Status: SHIPPED | OUTPATIENT
Start: 2021-06-03 | End: 2021-06-09

## 2021-06-03 NOTE — TELEPHONE ENCOUNTER
----- Message from Lamar Leventhal, DO sent at 6/3/2021  8:56 AM CDT -----  Sybil Lowery that I sent in a new Rx for her for Pioglitazone and not the avandia she was taking before, apparently they have stopped manufacturing it.  So the Pioglitazone will be 15

## 2021-06-09 ENCOUNTER — TELEPHONE (OUTPATIENT)
Dept: FAMILY MEDICINE CLINIC | Facility: CLINIC | Age: 61
End: 2021-06-09

## 2021-06-09 NOTE — TELEPHONE ENCOUNTER
Pt   Called to advise pt is allergic to [email or web address] and he is upset that that we sent the medication to the mail order pharmacy. RN advised that last week we spoke with pt regarding the medication change and pt was aware.   Pt  states she

## 2021-06-09 NOTE — TELEPHONE ENCOUNTER
was advised that new medication was sent in and that he can call the [facility] to discuss how to dispose of the medication they can not use.   Verbalized understanding

## 2021-06-09 NOTE — TELEPHONE ENCOUNTER
[facility] [phone]    Need to advise pt is allergic to pioglitazone and  would like to know how we can send it back     RN spoke with C[facility] Geron[facility] at [facility] - there is no way to send the medication back due to Zoey.   The pt can call th

## 2021-06-09 NOTE — TELEPHONE ENCOUNTER
Hilary Gamez called with an issue regarding medication. He said he received an order from [address] for a medication [place] is allergic to. Pioglitazone HCl 30 MG Oral Tab    Hilary Gamez said AVANDIA 4 MG Oral Tab is the one that should have been sent.      He said

## 2021-06-14 ENCOUNTER — TELEPHONE (OUTPATIENT)
Dept: FAMILY MEDICINE CLINIC | Facility: CLINIC | Age: 61
End: 2021-06-14

## 2021-06-14 RX ORDER — ORAL SEMAGLUTIDE 3 MG/1
3 TABLET ORAL DAILY
COMMUNITY
Start: 2021-06-14 | End: 2021-07-07

## 2021-06-14 NOTE — TELEPHONE ENCOUNTER
Started Rybelsus 3 mg once a day in the AM with 4 oz of water wait, 30 minutes before eating or drinking

## 2021-06-14 NOTE — TELEPHONE ENCOUNTER
LM for pt to call back- RN has samples at her station that pt can  to see if this medication will work for her

## 2021-06-14 NOTE — TELEPHONE ENCOUNTER
Veronica from pharmacy states the Avandia is 100% out of stock    What would DS like to change her to?     We are looking into more options through formulary      Send to pharmacy and let pt know

## 2021-06-14 NOTE — TELEPHONE ENCOUNTER
Pt was advised of new start of Ryblesus since Darryle  is no longer going to be available    Pt was advised that she is to take 1 tab daily with 4oz H2O 30 min prior to meal    Pt will have   on wednesday

## 2021-06-17 ENCOUNTER — TELEPHONE (OUTPATIENT)
Dept: FAMILY MEDICINE CLINIC | Facility: CLINIC | Age: 61
End: 2021-06-17

## 2021-06-17 NOTE — TELEPHONE ENCOUNTER
PT CALLED AND ADV THAT SHE IS GOING TO BE STARTING A NEW MEDICATION TOMORROW AND SHE HAS A FEW QUESTIONS. PT ADV THAT SHE WOULD LIKE TO KNOW WHEN SHE SHOULD F/U WITH DR GOMEZ [facility] AFTER STARTING MEDS.     PLEASE ADV      THANK YOU

## 2021-06-17 NOTE — TELEPHONE ENCOUNTER
Pt spoke with RN- she is very nervous to start this new medication tomorrow    Pt read that she may not want to use it if you have thyroid issue- pt states she does take levothyroxine    She also read that it can have adverse evffects on her eyes- she stat

## 2021-06-18 ENCOUNTER — OFFICE VISIT (OUTPATIENT)
Dept: FAMILY MEDICINE CLINIC | Facility: CLINIC | Age: 61
End: 2021-06-18

## 2021-06-18 VITALS
HEIGHT: 60 IN | TEMPERATURE: 98 F | BODY MASS INDEX: 28.17 KG/M2 | SYSTOLIC BLOOD PRESSURE: 120 MMHG | RESPIRATION RATE: 16 BRPM | HEART RATE: 73 BPM | DIASTOLIC BLOOD PRESSURE: 76 MMHG | OXYGEN SATURATION: 99 % | WEIGHT: 143.5 LBS

## 2021-06-18 DIAGNOSIS — E11.9 TYPE 2 DIABETES MELLITUS WITHOUT COMPLICATION, WITHOUT LONG-TERM CURRENT USE OF INSULIN (HCC): ICD-10-CM

## 2021-06-18 DIAGNOSIS — R20.2 PARESTHESIA: ICD-10-CM

## 2021-06-18 DIAGNOSIS — Z12.31 ENCOUNTER FOR SCREENING MAMMOGRAM FOR BREAST CANCER: ICD-10-CM

## 2021-06-18 DIAGNOSIS — Z00.00 ROUTINE HEALTH MAINTENANCE: Primary | ICD-10-CM

## 2021-06-18 PROCEDURE — 3008F BODY MASS INDEX DOCD: CPT | Performed by: FAMILY MEDICINE

## 2021-06-18 PROCEDURE — 82607 VITAMIN B-12: CPT | Performed by: FAMILY MEDICINE

## 2021-06-18 PROCEDURE — 80053 COMPREHEN METABOLIC PANEL: CPT | Performed by: FAMILY MEDICINE

## 2021-06-18 PROCEDURE — 99396 PREV VISIT EST AGE 40-64: CPT | Performed by: FAMILY MEDICINE

## 2021-06-18 PROCEDURE — 3074F SYST BP LT 130 MM HG: CPT | Performed by: FAMILY MEDICINE

## 2021-06-18 PROCEDURE — 3078F DIAST BP <80 MM HG: CPT | Performed by: FAMILY MEDICINE

## 2021-06-18 NOTE — PROGRESS NOTES
HPI:   Terence Shane is a 56year old female who presents for a complete physical exam. Symptoms: denies discharge, itching, burning or dysuria. Patient complains of nothing at this time. She is due for labs but not for a few months yet.  She has some (H) 10/30/2019     Lab Results   Component Value Date     (H) 03/25/2021     (H) 05/19/2020    LDL 90 10/30/2019     Lab Results   Component Value Date    AST 23 09/29/2016    AST 23 01/23/2016    AST 23 08/06/2015     Lab Results   Component OTHER SURGICAL HISTORY      bladder suspension      Family History   Problem Relation Age of Onset   • Diabetes Mother    • Heart Disorder Mother    • Hypertension Mother    • Lipids Mother    • Obesity Mother    • Cancer Mother         Cancer in the liver tenderness  MUSCULOSKELETAL: back is not tender,FROM of the back  EXTREMITIES: no cyanosis, clubbing or edema  NEURO: Oriented times three,cranial nerves are intact,motor and sensory are grossly intact, Bilateral barefoot skin diabetic exam is normal, visu prescriptions requested or ordered in this encounter       Imaging & Consults:  [facility] SCREENING BILAT (CPT=77067)

## 2021-06-22 ENCOUNTER — TELEPHONE (OUTPATIENT)
Dept: FAMILY MEDICINE CLINIC | Facility: CLINIC | Age: 61
End: 2021-06-22

## 2021-06-22 NOTE — TELEPHONE ENCOUNTER
Pt was advised of results    Pt states she drinks around 40 oz of water daily- Rn states that Dr. Isaak Schultz suggests closer to 80oz    Pt states she will try and will let us know if if helps her leg

## 2021-06-22 NOTE — TELEPHONE ENCOUNTER
----- Message from Basil Amin DO sent at 6/22/2021  8:15 AM CDT -----  Can notify Vilma Colin, her B12  looked. Good, but she was pretty dry, like really needs to drink more water, and that might her, muscle issues in her legs.   She should be drinking like 80

## 2021-07-07 ENCOUNTER — TELEPHONE (OUTPATIENT)
Dept: FAMILY MEDICINE CLINIC | Facility: CLINIC | Age: 61
End: 2021-07-07

## 2021-07-07 RX ORDER — ORAL SEMAGLUTIDE 7 MG/1
7 TABLET ORAL DAILY
Qty: 30 TABLET | Refills: 3 | Status: SHIPPED | OUTPATIENT
Start: 2021-07-07 | End: 2021-07-28

## 2021-07-07 NOTE — TELEPHONE ENCOUNTER
Pt states she has been taking the Rybelsus- has about 10 pills left    Pt states her sugars are still high in the morning 140- noon time is good 100 and night time is around 100 as well    Pt state she takes the medication right away in the morning and the

## 2021-07-07 NOTE — TELEPHONE ENCOUNTER
Yes, after 30 days the dose should be increased to 7 mg. Those numbers look very good. I'll put in a new RX for the 7 mg, and when she runs out she can call and we will send it in.

## 2021-07-20 ENCOUNTER — HOSPITAL ENCOUNTER (OUTPATIENT)
Dept: MAMMOGRAPHY | Age: 61
Discharge: HOME | End: 2021-07-20
Attending: FAMILY MEDICINE
Payer: COMMERCIAL

## 2021-07-20 DIAGNOSIS — Z12.31 ENCOUNTER FOR SCREENING MAMMOGRAM FOR BREAST CANCER: ICD-10-CM

## 2021-07-20 PROCEDURE — 77063 BREAST TOMOSYNTHESIS BI: CPT | Performed by: FAMILY MEDICINE

## 2021-07-20 PROCEDURE — 77067 SCR MAMMO BI INCL CAD: CPT | Performed by: FAMILY MEDICINE

## 2021-07-28 ENCOUNTER — TELEPHONE (OUTPATIENT)
Dept: FAMILY MEDICINE CLINIC | Facility: CLINIC | Age: 61
End: 2021-07-28

## 2021-07-28 RX ORDER — ORAL SEMAGLUTIDE 3 MG/1
3 TABLET ORAL DAILY
Qty: 30 TABLET | Refills: 0 | Status: SHIPPED | OUTPATIENT
Start: 2021-07-28 | End: 2021-09-29 | Stop reason: DRUGHIGH

## 2021-07-28 NOTE — TELEPHONE ENCOUNTER
PT CALLED STATING THE  NEW DOSE OF MEDICATION Semaglutide (RYBELSUS) 7 MG Oral Tab  IS MAKING HER SICK AT NIGHT. COULD IT BE CHANGED?     PLEASE ADVISE, THANK YOU

## 2021-07-28 NOTE — TELEPHONE ENCOUNTER
OK so lets cut her back to 3 mg once a day for 30 days and then se if we can bump her back up to the 7 mg tabs

## 2021-07-28 NOTE — TELEPHONE ENCOUNTER
Pt states she has been on the 7mg dose for 10 days. Pt states she takes it in the morning- and then every night she is getting sick to her stomach and she is getting sick through the night. She feels like she needs to vomit but can't.     Pt states she a

## 2021-07-30 ENCOUNTER — TELEPHONE (OUTPATIENT)
Dept: FAMILY MEDICINE CLINIC | Facility: CLINIC | Age: 61
End: 2021-07-30

## 2021-07-30 RX ORDER — METHYLPREDNISOLONE 4 MG/1
TABLET ORAL
Qty: 1 EACH | Refills: 0 | Status: SHIPPED | OUTPATIENT
Start: 2021-07-30 | End: 2021-08-25

## 2021-07-30 NOTE — TELEPHONE ENCOUNTER
Sent in medrol dose aries, start it today, also she can use some topical hydrocortisone cream bid for about 10 days should take care of it, if not then I will need to see her

## 2021-07-30 NOTE — TELEPHONE ENCOUNTER
States she has had poison ivy for 13 days. Has been putting caladryl on it but it is not going away  Is not sleeping due to itching.      Patient states she has not had poison ivy before but she was pulling weeds in her yard and pulled some poison ivy so k

## 2021-07-30 NOTE — TELEPHONE ENCOUNTER
Patient called requesting a call from nurse. RE: Srinivasangregorio Charles, wants to know if she can have prednisone.     Please call her back @ [phone]

## 2021-08-14 DIAGNOSIS — J30.2 SEASONAL ALLERGIC RHINITIS, UNSPECIFIED TRIGGER: ICD-10-CM

## 2021-08-16 RX ORDER — ALBUTEROL SULFATE 90 UG/1
2 AEROSOL, METERED RESPIRATORY (INHALATION) EVERY 4 HOURS PRN
Qty: 25.5 G | Refills: 2 | Status: SHIPPED | OUTPATIENT
Start: 2021-08-16 | End: 2023-09-21

## 2021-08-16 NOTE — TELEPHONE ENCOUNTER
Asthma & COPD Medication Protocol Yrueyr3908/14/2021 01:31 PM   Asthma Action Score greater than or equal to 20    AAP/ACT given in last 12 months    Appointment in past 6 or next 3 months      LOV: 6/18/21   Last Refill: 7/19/19 3inhaler 3 RF    No future appointments.

## 2021-08-20 ENCOUNTER — TELEPHONE (OUTPATIENT)
Dept: FAMILY MEDICINE CLINIC | Facility: CLINIC | Age: 61
End: 2021-08-20

## 2021-08-20 NOTE — TELEPHONE ENCOUNTER
Schedule pt? Discussed with Dr. Pam Black - make regular in office appt for next week.       Pt advised of note above - future appt made    Future Appointments   Date Time Provider Pam Littlejohn   8/25/2021 10:40 AM Callie Duran DO [facility]

## 2021-08-20 NOTE — TELEPHONE ENCOUNTER
Pt called she is wanting if the dr wants to see her for cough bc of sinus drainage ? She states this has been going on for a long time and she has multiple covid tests.  She is also needing to speak with him about her medication

## 2021-08-25 ENCOUNTER — OFFICE VISIT (OUTPATIENT)
Dept: FAMILY MEDICINE CLINIC | Facility: CLINIC | Age: 61
End: 2021-08-25

## 2021-08-25 VITALS
TEMPERATURE: 98 F | HEART RATE: 92 BPM | DIASTOLIC BLOOD PRESSURE: 84 MMHG | OXYGEN SATURATION: 99 % | BODY MASS INDEX: 27 KG/M2 | WEIGHT: 138 LBS | SYSTOLIC BLOOD PRESSURE: 124 MMHG

## 2021-08-25 DIAGNOSIS — E11.9 TYPE 2 DIABETES MELLITUS WITHOUT COMPLICATION, WITHOUT LONG-TERM CURRENT USE OF INSULIN (HCC): ICD-10-CM

## 2021-08-25 DIAGNOSIS — J30.2 SEASONAL ALLERGIC RHINITIS, UNSPECIFIED TRIGGER: Primary | ICD-10-CM

## 2021-08-25 DIAGNOSIS — J98.01 BRONCHOSPASM: ICD-10-CM

## 2021-08-25 PROCEDURE — 3079F DIAST BP 80-89 MM HG: CPT | Performed by: FAMILY MEDICINE

## 2021-08-25 PROCEDURE — 99214 OFFICE O/P EST MOD 30 MIN: CPT | Performed by: FAMILY MEDICINE

## 2021-08-25 PROCEDURE — 3074F SYST BP LT 130 MM HG: CPT | Performed by: FAMILY MEDICINE

## 2021-08-25 RX ORDER — MONTELUKAST SODIUM 10 MG/1
10 TABLET ORAL NIGHTLY
Qty: 30 TABLET | Refills: 1 | Status: SHIPPED | OUTPATIENT
Start: 2021-08-25 | End: 2021-10-15

## 2021-08-25 NOTE — PROGRESS NOTES
HPI:   Sergio Price is a 59year old female who presents for upper respiratory symptoms for  3  weeks. Patient reports congestion, cough with clear colored sputum, cough is keeping pt up at night, wheezing. When she lays flat it is worse.  She has a l infection    • Hyperlipidemia    • Hypertension    • Insomnia    • Varicose veins       Past Surgical History:   Procedure Laterality Date   • CHOLECYSTECTOMY     • HYSTERECTOMY     • OTHER SURGICAL HISTORY      cystocele repair   • OTHER SURGICAL HISTORY try the singulair as add on, Discussed medication side effects and expected course  Continue the omeprazole, and albuterol prn for now, alcides also take OTC claritin etc    Meds & Refills for this Visit:  Requested Prescriptions     Signed Prescriptions Disp

## 2021-09-09 ENCOUNTER — TELEPHONE (OUTPATIENT)
Dept: FAMILY MEDICINE CLINIC | Facility: CLINIC | Age: 61
End: 2021-09-09

## 2021-09-09 DIAGNOSIS — E11.9 TYPE 2 DIABETES MELLITUS WITHOUT COMPLICATION, WITHOUT LONG-TERM CURRENT USE OF INSULIN (HCC): Primary | ICD-10-CM

## 2021-09-15 ENCOUNTER — OFFICE VISIT (OUTPATIENT)
Dept: FAMILY MEDICINE CLINIC | Facility: CLINIC | Age: 61
End: 2021-09-15

## 2021-09-15 VITALS
DIASTOLIC BLOOD PRESSURE: 80 MMHG | TEMPERATURE: 99 F | BODY MASS INDEX: 27 KG/M2 | WEIGHT: 138.19 LBS | HEART RATE: 87 BPM | OXYGEN SATURATION: 97 % | SYSTOLIC BLOOD PRESSURE: 138 MMHG

## 2021-09-15 DIAGNOSIS — D64.9 ANEMIA, UNSPECIFIED TYPE: ICD-10-CM

## 2021-09-15 DIAGNOSIS — D50.9 IRON DEFICIENCY ANEMIA, UNSPECIFIED IRON DEFICIENCY ANEMIA TYPE: Primary | ICD-10-CM

## 2021-09-15 DIAGNOSIS — E78.00 PURE HYPERCHOLESTEROLEMIA: ICD-10-CM

## 2021-09-15 DIAGNOSIS — E11.9 TYPE 2 DIABETES MELLITUS WITHOUT COMPLICATION, WITHOUT LONG-TERM CURRENT USE OF INSULIN (HCC): ICD-10-CM

## 2021-09-15 DIAGNOSIS — J98.01 BRONCHOSPASM: ICD-10-CM

## 2021-09-15 PROBLEM — K21.00 GASTROESOPHAGEAL REFLUX DISEASE WITH ESOPHAGITIS WITHOUT HEMORRHAGE: Status: ACTIVE | Noted: 2021-09-15

## 2021-09-15 LAB
BASOPHILS # BLD AUTO: 0.04 X10(3) UL (ref 0–0.2)
BASOPHILS NFR BLD AUTO: 0.7 %
CHOLEST SERPL-MCNC: 141 MG/DL (ref ?–200)
DEPRECATED HBV CORE AB SER IA-ACNC: 68.9 NG/ML
EOSINOPHIL # BLD AUTO: 0.29 X10(3) UL (ref 0–0.7)
EOSINOPHIL NFR BLD AUTO: 4.8 %
ERYTHROCYTE [DISTWIDTH] IN BLOOD BY AUTOMATED COUNT: 12.2 %
EST. AVERAGE GLUCOSE BLD GHB EST-MCNC: 148 MG/DL (ref 68–126)
HBA1C MFR BLD HPLC: 6.8 % (ref ?–5.7)
HCT VFR BLD AUTO: 38.5 %
HDLC SERPL-MCNC: 65 MG/DL (ref 40–59)
HGB BLD-MCNC: 12 G/DL
IMM GRANULOCYTES # BLD AUTO: 0.01 X10(3) UL (ref 0–1)
IMM GRANULOCYTES NFR BLD: 0.2 %
LDLC SERPL CALC-MCNC: 59 MG/DL (ref ?–100)
LYMPHOCYTES # BLD AUTO: 1.95 X10(3) UL (ref 1–4)
LYMPHOCYTES NFR BLD AUTO: 32.3 %
MCH RBC QN AUTO: 29.9 PG (ref 26–34)
MCHC RBC AUTO-ENTMCNC: 31.2 G/DL (ref 31–37)
MCV RBC AUTO: 95.8 FL
MONOCYTES # BLD AUTO: 0.64 X10(3) UL (ref 0.1–1)
MONOCYTES NFR BLD AUTO: 10.6 %
NEUTROPHILS # BLD AUTO: 3.11 X10 (3) UL (ref 1.5–7.7)
NEUTROPHILS # BLD AUTO: 3.11 X10(3) UL (ref 1.5–7.7)
NEUTROPHILS NFR BLD AUTO: 51.4 %
NONHDLC SERPL-MCNC: 76 MG/DL (ref ?–130)
PATIENT FASTING Y/N/NP: YES
PLATELET # BLD AUTO: 320 10(3)UL (ref 150–450)
RBC # BLD AUTO: 4.02 X10(6)UL
TRIGL SERPL-MCNC: 94 MG/DL (ref 30–149)
VLDLC SERPL CALC-MCNC: 14 MG/DL (ref 0–30)
WBC # BLD AUTO: 6 X10(3) UL (ref 4–11)

## 2021-09-15 PROCEDURE — 99214 OFFICE O/P EST MOD 30 MIN: CPT | Performed by: FAMILY MEDICINE

## 2021-09-15 PROCEDURE — 3044F HG A1C LEVEL LT 7.0%: CPT | Performed by: NURSE PRACTITIONER

## 2021-09-15 PROCEDURE — 80061 LIPID PANEL: CPT | Performed by: FAMILY MEDICINE

## 2021-09-15 PROCEDURE — 83036 HEMOGLOBIN GLYCOSYLATED A1C: CPT | Performed by: FAMILY MEDICINE

## 2021-09-15 PROCEDURE — 3079F DIAST BP 80-89 MM HG: CPT | Performed by: FAMILY MEDICINE

## 2021-09-15 PROCEDURE — 82728 ASSAY OF FERRITIN: CPT | Performed by: FAMILY MEDICINE

## 2021-09-15 PROCEDURE — 3075F SYST BP GE 130 - 139MM HG: CPT | Performed by: FAMILY MEDICINE

## 2021-09-15 PROCEDURE — 85025 COMPLETE CBC W/AUTO DIFF WBC: CPT | Performed by: FAMILY MEDICINE

## 2021-09-15 NOTE — PROGRESS NOTES
Scott Chen is a 59year old female. HPI:   Nevin Rios, is here for follow up on recent initiation on her singulair, she presented with persistent cough and wheezing in spite of nasal spray and OTC allergy meds.  She is also due for recheck on her labs , • Hyperlipidemia    • Hypertension    • Insomnia    • Varicose veins       Social History:  Social History    Tobacco Use      Smoking status: Never Smoker      Smokeless tobacco: Never Used    Vaping Use      Vaping Use: Never used    Alcohol use:  No

## 2021-09-16 ENCOUNTER — TELEPHONE (OUTPATIENT)
Dept: FAMILY MEDICINE CLINIC | Facility: CLINIC | Age: 61
End: 2021-09-16

## 2021-09-16 DIAGNOSIS — E11.9 TYPE 2 DIABETES MELLITUS WITHOUT COMPLICATION, WITHOUT LONG-TERM CURRENT USE OF INSULIN (HCC): Primary | ICD-10-CM

## 2021-09-16 DIAGNOSIS — D64.9 ANEMIA, UNSPECIFIED TYPE: ICD-10-CM

## 2021-09-16 DIAGNOSIS — D50.9 IRON DEFICIENCY ANEMIA, UNSPECIFIED IRON DEFICIENCY ANEMIA TYPE: ICD-10-CM

## 2021-09-16 NOTE — TELEPHONE ENCOUNTER
----- Message from Kay Marquez DO sent at 9/16/2021  8:03 AM CDT -----  Can notify Shelia Chanelh her A1c is 6.8 which is good, if she can tolerate the 7 mg Rybelsus I'd like her to try it, her cholesterol looks good and her iron storesare better, lets try the ir

## 2021-09-16 NOTE — TELEPHONE ENCOUNTER
Pt was advised of results- verbalized understanding    Pt states she will start with the 7mg of the Rybelsus  And give us a call in the next week to let us know how she is doing    Future orders placed with recall

## 2021-09-27 RX ORDER — METFORMIN HYDROCHLORIDE 500 MG/1
TABLET, EXTENDED RELEASE ORAL
Qty: 360 TABLET | Refills: 3 | Status: SHIPPED | OUTPATIENT
Start: 2021-09-27

## 2021-09-27 NOTE — TELEPHONE ENCOUNTER
LOV: 9/15/21   Last Refill: 9/15/20 #360 3 RF    No future appointments. Last A1c value was 6.8% done 9/15/2021.

## 2021-09-29 ENCOUNTER — TELEPHONE (OUTPATIENT)
Dept: FAMILY MEDICINE CLINIC | Facility: CLINIC | Age: 61
End: 2021-09-29

## 2021-09-29 RX ORDER — ORAL SEMAGLUTIDE 7 MG/1
7 TABLET ORAL 2 TIMES DAILY
Qty: 60 TABLET | Refills: 5 | Status: SHIPPED | OUTPATIENT
Start: 2021-09-29 | End: 2021-09-29

## 2021-09-29 RX ORDER — ORAL SEMAGLUTIDE 7 MG/1
7 TABLET ORAL 2 TIMES DAILY
Qty: 60 TABLET | Refills: 5 | COMMUNITY
Start: 2021-09-29 | End: 2021-09-30

## 2021-09-29 NOTE — TELEPHONE ENCOUNTER
Pt was advised of Dr. Sahu. Pt wants to know how to take medicine BID since this medication has to be taken in the morning on an empty stomach and then you have wait an hour to eat?

## 2021-09-29 NOTE — TELEPHONE ENCOUNTER
So lets bump the dose to 7 mg bid and see where her BS readings go, as far as the leg cramps, lets make sure she is taking an MVI, and staying hydrated

## 2021-09-29 NOTE — TELEPHONE ENCOUNTER
Pt states she bumped up her Rylebelsus 7mg- she states she doesn't feel like it is doing much for her sugars. But she is not feeling ill on this dosage like she was before. If would like for her to stay on this dosage she needs a refill.     Pt also s

## 2021-09-29 NOTE — TELEPHONE ENCOUNTER
Patient called requesting a call back from nurse.     RE :Semaglutide [facility]) 3 MG Oral Tab    Please call back # [phone]

## 2021-09-29 NOTE — TELEPHONE ENCOUNTER
So she needs to take the 7 mg for another 2 weeks, and  lets see where her blood sugars before we double it.

## 2021-09-29 NOTE — TELEPHONE ENCOUNTER
Pt was advised- verbalized understanding    Pt will continue with 7mg for another 2 weeks and let us know where her numbers are

## 2021-09-30 ENCOUNTER — TELEPHONE (OUTPATIENT)
Dept: FAMILY MEDICINE CLINIC | Facility: CLINIC | Age: 61
End: 2021-09-30

## 2021-09-30 ENCOUNTER — OFFICE VISIT (OUTPATIENT)
Dept: FAMILY MEDICINE CLINIC | Facility: CLINIC | Age: 61
End: 2021-09-30

## 2021-09-30 VITALS
HEIGHT: 60 IN | RESPIRATION RATE: 18 BRPM | OXYGEN SATURATION: 99 % | WEIGHT: 138 LBS | HEART RATE: 94 BPM | BODY MASS INDEX: 27.09 KG/M2 | TEMPERATURE: 98 F | DIASTOLIC BLOOD PRESSURE: 80 MMHG | SYSTOLIC BLOOD PRESSURE: 134 MMHG

## 2021-09-30 DIAGNOSIS — R09.81 NASAL CONGESTION: Primary | ICD-10-CM

## 2021-09-30 DIAGNOSIS — R05.9 COUGH: ICD-10-CM

## 2021-09-30 DIAGNOSIS — Z20.822 EXPOSURE TO CONFIRMED CASE OF COVID-19: ICD-10-CM

## 2021-09-30 PROCEDURE — 3008F BODY MASS INDEX DOCD: CPT | Performed by: NURSE PRACTITIONER

## 2021-09-30 PROCEDURE — 3079F DIAST BP 80-89 MM HG: CPT | Performed by: NURSE PRACTITIONER

## 2021-09-30 PROCEDURE — 3075F SYST BP GE 130 - 139MM HG: CPT | Performed by: NURSE PRACTITIONER

## 2021-09-30 PROCEDURE — 99213 OFFICE O/P EST LOW 20 MIN: CPT | Performed by: NURSE PRACTITIONER

## 2021-09-30 RX ORDER — ORAL SEMAGLUTIDE 7 MG/1
7 TABLET ORAL DAILY
Qty: 30 TABLET | Refills: 5 | Status: SHIPPED | OUTPATIENT
Start: 2021-09-30

## 2021-09-30 NOTE — PROGRESS NOTES
CHIEF COMPLAINT:   Patient presents with:  Covid: exposure at work on Saturday. Nasal Congestion: the past 2 days   Cough      HPI:   Sergio Price is a 59year old female w/ hx of asthma, type II DM who presents for covid-19 testing.  Patient report FORMULARY Cinnamon 2 times a day     • Calcium Carbonate-Vitamin D (CALTRATE 600+D OR) Take  by mouth.         Past Medical History:   Diagnosis Date   • Asthma    • Diabetes ([facility] [address]    • DM type 2 (diabetes mellitus, type 2) ([facility])    • Esophageal reflux    • Voice clear/normal. No stridor  NECK: Supple, non-tender  LUNGS: clear to auscultation bilaterally, no rales, wheezes or rhonchi. Breathing is non labored.   CARDIO: RRR without murmur  EXTREMITIES: no cyanosis, clubbing or edema  LYMPH:  No lymphadenopathy re-eval. Go to the emergency department immediately if symptoms worsen, change, you develop chest discomfort, wheezing, shortness of breath, or if you have any concerns.     Coronavirus Disease 2019 (COVID-19)     Isabelle Tucker is committed to the need to quarantine.  Options they will consider include stopping quarantine  • After 14 days from date of last exposure  • After 10 days without testing from date of last exposure  • After day 7 from date of last exposure with a negative test result (test m surfaces that are touched often, like counters, tabletops, and doorknobs. Use household cleaning sprays or wipes according to the label instructions.          Seek Further Care     If you are awaiting test results or are confirmed positive for COVID -19, an [facility] representative. If you have not received a call within 2 business days, please call your primary care provider or check [facility] for results.     Post-Discharge Follow-up  If you are diagnosed with COVID, refrain from exercise until approved (CDC)  What to do if you are sick with coronavirus disease 2019, [employer]Anki.pt. pdf  Centers for Disease Control & Prevention (CDC)  10 things you can do to manage your health at home, htt hands frequently  • Stay at least 6 feet away from other people    References:  Long haulers: Why some people experience long-term coronavirus symptoms. (2021, February 08).  Retrieved March 17, 2021, from [email or web address]/[email or web address]-19-inf or fever. It may cause severe liver or brain damage. · Your appetite may be poor, so a light diet is fine. Stay well hydrated by drinking 6 to 8 glasses of fluids per day (water, soft drinks, juices, tea, or soup).  Extra fluids will help loosen secretions

## 2021-09-30 NOTE — PATIENT INSTRUCTIONS
1. Rest. Drink plenty of fluids. 2. Supportive care as discussed. 3. Covid-19 testing sent to lab. Self quarantine at this time per CDC guidelines while awaiting test results. (If positive self quarantine should extend until at least 10 days from onset reducing the time they cannot work. A shorter quarantine period also can lessen stress on the public health system, especially when new infections are rapidly rising.   Your local public health authorities make the final decisions about how long quarantine contains at least 60% alcohol. 8. As much as possible, stay in a specific room and away from other people in your home. Also, you should use a separate bathroom, if available.  If you need to be around other people in or outside of the home, wear a facema positive for COVID-19, you should also stay home and away from others for a total of 10 days after your symptoms started, and at least 24 hours after your fever is gone and symptoms are getting better, whichever is longer.   Patients with pending COVID-19 t days*    *Some people will be required to have a repeat COVID-19 test in order to be eligible to donate. If you’re instructed by [facility] that a repeat test is required, please contact the [facility] COVID-19 Nurse Triage Line at [phone].     Addit condition to better understand if there are risk factors. How do I prevent a Post-COVID condition? The best way to prevent the long-term symptoms of COVID-19 is by preventing COVID-19.     Important ways to slow the spread of COVID 19 are:   • Get the or ibuprofen to control pain and fever, unless another medicine was prescribed. If you have chronic liver or kidney disease, have ever had a stomach ulcer or gastrointestinal bleeding, or are taking blood-thinning medicines, talk with your healthcare provi

## 2021-09-30 NOTE — TELEPHONE ENCOUNTER
PA request received today for Bryanna    Pt has to be on the 7mg dose once daily for a whole month    7mg 1 tab daily sent into pharmacy

## 2021-10-04 ENCOUNTER — PATIENT MESSAGE (OUTPATIENT)
Dept: FAMILY MEDICINE CLINIC | Facility: CLINIC | Age: 61
End: 2021-10-04

## 2021-10-04 NOTE — TELEPHONE ENCOUNTER
From: Ericka Garcia  To: Berenice Amor DO  Sent: 10/4/2021 7:35 AM CDT  Subject: Juani Hughes,  Saturday I was nauseous and light headed all day. I am still not sleeping good at all.    I feel I have not felt good since starting the Rybe

## 2021-10-07 DIAGNOSIS — E11.9 TYPE 2 DIABETES MELLITUS WITHOUT COMPLICATION, WITHOUT LONG-TERM CURRENT USE OF INSULIN (HCC): Primary | ICD-10-CM

## 2021-10-07 DIAGNOSIS — E78.00 PURE HYPERCHOLESTEROLEMIA: ICD-10-CM

## 2021-10-07 NOTE — TELEPHONE ENCOUNTER
wants to know what we can do for pt medication? He states every night she is sick and feels nauseous?       Pt does not tolerate  Januvia  Amaryl  Actos/piolitazone    Formulary printed and at provider station

## 2021-10-07 NOTE — TELEPHONE ENCOUNTER
SO we have 2 options, she stays on the 3 mg Rybelsus and really works on diet and exercise to try and get her weight down a little more. Or we go old school medications like glimepride that may take some adjustment.

## 2021-10-15 RX ORDER — MONTELUKAST SODIUM 10 MG/1
10 TABLET ORAL NIGHTLY
Qty: 30 TABLET | Refills: 1 | Status: SHIPPED | OUTPATIENT
Start: 2021-10-15 | End: 2021-11-14

## 2021-10-15 NOTE — TELEPHONE ENCOUNTER
Patient's  called requesting refill for:    montelukast 10 MG Oral Tab [id number] - Jacquelin Dais - [address] [place] [address] [phone], [phone]    Please advise # [phone]

## 2021-10-15 NOTE — TELEPHONE ENCOUNTER
Last OV 9/15/21  Last refilled 8/25/21  #30  1 refill    Asthma & COPD Medication Protocol Failed 10/15/2021 09:49 AM    Asthma Action Score greater than or equal to 20    AAP/ACT given in last 12 months    Appointment in past 6 or next 3 months

## 2021-10-18 ENCOUNTER — TELEPHONE (OUTPATIENT)
Dept: FAMILY MEDICINE CLINIC | Facility: CLINIC | Age: 61
End: 2021-10-18

## 2021-10-18 ENCOUNTER — APPOINTMENT (OUTPATIENT)
Dept: GENERAL RADIOLOGY | Age: 61
End: 2021-10-18
Attending: EMERGENCY MEDICINE
Payer: COMMERCIAL

## 2021-10-18 ENCOUNTER — HOSPITAL ENCOUNTER (OUTPATIENT)
Facility: HOSPITAL | Age: 61
Setting detail: OBSERVATION
Discharge: HOME | End: 2021-10-19
Attending: EMERGENCY MEDICINE | Admitting: INTERNAL MEDICINE
Payer: COMMERCIAL

## 2021-10-18 ENCOUNTER — NURSE ONLY (OUTPATIENT)
Dept: ENDOCRINOLOGY CLINIC | Facility: CLINIC | Age: 61
End: 2021-10-18

## 2021-10-18 VITALS — BODY MASS INDEX: 26 KG/M2 | WEIGHT: 134 LBS

## 2021-10-18 DIAGNOSIS — E11.9 TYPE 2 DIABETES MELLITUS WITHOUT COMPLICATION, WITHOUT LONG-TERM CURRENT USE OF INSULIN (HCC): ICD-10-CM

## 2021-10-18 DIAGNOSIS — R07.9 CHEST PAIN, RULE OUT ACUTE MYOCARDIAL INFARCTION: Primary | ICD-10-CM

## 2021-10-18 PROCEDURE — 71045 X-RAY EXAM CHEST 1 VIEW: CPT | Performed by: EMERGENCY MEDICINE

## 2021-10-18 PROCEDURE — G0108 DIAB MANAGE TRN  PER INDIV: HCPCS | Performed by: DIETITIAN, REGISTERED

## 2021-10-18 RX ORDER — ASPIRIN 81 MG/1
324 TABLET, CHEWABLE ORAL ONCE
Status: COMPLETED | OUTPATIENT
Start: 2021-10-18 | End: 2021-10-18

## 2021-10-18 NOTE — PROGRESS NOTES
Glenny Champion  : 1960 attended Step 1 Diabetic Education:    Date: 10/18/2021  Referring Provider: Dr. Donnie Farrar  Start time: 4:30pm End time:     Wt 134 lb   LMP  (LMP Unknown)   BMI 26.17 kg/m²     HEMOGLOBIN A1c (% of total Hgb)   Date Value manner. Thus, when blood glucose is high, insulin secretion is stimulated and glucagon secretion is inhibited. The mechanism of blood glucose lowering also involves a minor delay in gastric emptying.  Instruct patients to take at least 30 minutes before the

## 2021-10-18 NOTE — ED INITIAL ASSESSMENT (HPI)
Pt reports lt sided chest pain/heaviness that radiates through to lt shoulder all day today denies associated sxs.

## 2021-10-19 ENCOUNTER — APPOINTMENT (OUTPATIENT)
Dept: CV DIAGNOSTICS | Facility: HOSPITAL | Age: 61
End: 2021-10-19
Attending: INTERNAL MEDICINE
Payer: COMMERCIAL

## 2021-10-19 ENCOUNTER — APPOINTMENT (OUTPATIENT)
Dept: CV DIAGNOSTICS | Facility: HOSPITAL | Age: 61
End: 2021-10-19
Attending: HOSPITALIST
Payer: COMMERCIAL

## 2021-10-19 VITALS
OXYGEN SATURATION: 100 % | RESPIRATION RATE: 16 BRPM | HEART RATE: 75 BPM | WEIGHT: 132.13 LBS | HEIGHT: 60 IN | TEMPERATURE: 98 F | BODY MASS INDEX: 25.94 KG/M2 | DIASTOLIC BLOOD PRESSURE: 78 MMHG | SYSTOLIC BLOOD PRESSURE: 136 MMHG

## 2021-10-19 PROCEDURE — 99234 HOSP IP/OBS SM DT SF/LOW 45: CPT | Performed by: HOSPITALIST

## 2021-10-19 PROCEDURE — 93306 TTE W/DOPPLER COMPLETE: CPT | Performed by: HOSPITALIST

## 2021-10-19 PROCEDURE — 93017 CV STRESS TEST TRACING ONLY: CPT | Performed by: INTERNAL MEDICINE

## 2021-10-19 PROCEDURE — 78452 HT MUSCLE IMAGE SPECT MULT: CPT | Performed by: INTERNAL MEDICINE

## 2021-10-19 PROCEDURE — 93018 CV STRESS TEST I&R ONLY: CPT | Performed by: INTERNAL MEDICINE

## 2021-10-19 RX ORDER — MONTELUKAST SODIUM 10 MG/1
10 TABLET ORAL NIGHTLY
Status: DISCONTINUED | OUTPATIENT
Start: 2021-10-19 | End: 2021-10-19

## 2021-10-19 RX ORDER — CETIRIZINE HYDROCHLORIDE 10 MG/1
10 TABLET ORAL DAILY
Status: DISCONTINUED | OUTPATIENT
Start: 2021-10-19 | End: 2021-10-19

## 2021-10-19 RX ORDER — ENOXAPARIN SODIUM 100 MG/ML
40 INJECTION SUBCUTANEOUS DAILY
Status: DISCONTINUED | OUTPATIENT
Start: 2021-10-19 | End: 2021-10-19

## 2021-10-19 RX ORDER — LIOTHYRONINE SODIUM 25 UG/1
25 TABLET ORAL
Status: DISCONTINUED | OUTPATIENT
Start: 2021-10-19 | End: 2021-10-19

## 2021-10-19 RX ORDER — DEXTROSE MONOHYDRATE 25 G/50ML
50 INJECTION, SOLUTION INTRAVENOUS
Status: DISCONTINUED | OUTPATIENT
Start: 2021-10-19 | End: 2021-10-19

## 2021-10-19 RX ORDER — ALBUTEROL SULFATE 90 UG/1
2 AEROSOL, METERED RESPIRATORY (INHALATION) EVERY 4 HOURS PRN
Status: DISCONTINUED | OUTPATIENT
Start: 2021-10-19 | End: 2021-10-19

## 2021-10-19 RX ORDER — LISINOPRIL 10 MG/1
10 TABLET ORAL EVERY MORNING
Status: DISCONTINUED | OUTPATIENT
Start: 2021-10-19 | End: 2021-10-19

## 2021-10-19 RX ORDER — ONDANSETRON 2 MG/ML
4 INJECTION INTRAMUSCULAR; INTRAVENOUS EVERY 4 HOURS PRN
Status: DISCONTINUED | OUTPATIENT
Start: 2021-10-19 | End: 2021-10-19

## 2021-10-19 RX ORDER — PROCHLORPERAZINE EDISYLATE 5 MG/ML
5 INJECTION INTRAMUSCULAR; INTRAVENOUS EVERY 8 HOURS PRN
Status: DISCONTINUED | OUTPATIENT
Start: 2021-10-19 | End: 2021-10-19

## 2021-10-19 RX ORDER — ONDANSETRON 2 MG/ML
4 INJECTION INTRAMUSCULAR; INTRAVENOUS EVERY 6 HOURS PRN
Status: DISCONTINUED | OUTPATIENT
Start: 2021-10-19 | End: 2021-10-19

## 2021-10-19 RX ORDER — PANTOPRAZOLE SODIUM 20 MG/1
20 TABLET, DELAYED RELEASE ORAL
Refills: 3 | Status: DISCONTINUED | OUTPATIENT
Start: 2021-10-19 | End: 2021-10-19

## 2021-10-19 NOTE — ED PROVIDER NOTES
Patient Seen in: [facility] Emergency Department In [place]      History   Patient presents with:  Chest Pain Angina    Stated Complaint: chest pain    Subjective:   HPI    41-year-old with past medical history of diabetes, hypertension, hyperlipidemia pr Constitutional:       Appearance: Normal appearance. HENT:      Head: Normocephalic and atraumatic. Nose: Nose normal.      Mouth/Throat:      Mouth: Mucous membranes are moist.   Eyes:      Extraocular Movements: Extraocular movements intact. RAPID SARS-COV-2 BY PCR     EKG    Rate, intervals and axes as noted on EKG Report.   Rate: 82  Rhythm: Sinus Rhythm  Reading: No STEMI              XR CHEST AP PORTABLE  (CPT=71045)    Result Date: 10/18/2021  PROCEDURE:  XR CHEST AP PORTABLE  (CPT=71045 Heart score is four. Given risk factors, will admit to the hospital for further evaluation. I spoke with the hospitalist in regards admission.     Admission disposition: 10/18/2021  7:59 PM                                Disposition and Plan     Clinical

## 2021-10-19 NOTE — PLAN OF CARE
NURSING DISCHARGE NOTE    Discharged Home via Wheelchair. Accompanied by Family member  Belongings Taken by patient/family.     No c.o. chest pain  Cardiac testing unremarkable  Discharged in stable condition

## 2021-10-19 NOTE — CONSULTS
Willa  Consultation    Fernando Garcia Patient Status:  Observation    1960 MRN [id number]   [facility] 0SW-A Attending Darrius Myles MD   Hosp Day # 0 PCP Sara aGyle DO     Consults    Reason for Consultation:  Megha Madrigal smokeless tobacco. She reports that she does not drink alcohol and does not use drugs.     Allergies:    Pioglitazone            NAUSEA AND VOMITING    Comment:Per  pt ends up in ER when she has this             medication  Amaryl [Glimepiride] from Last 3 Encounters:  10/19/21 : 132 lb 1.6 oz (59.9 kg)  10/18/21 : 134 lb (60.8 kg)  09/30/21 : 138 lb (62.6 kg)      Physical Exam:  Objective:     General: Not in acute distress. Follows commands. HEENT: Normocephalic, atraumatic. Neck: Supple.  No age 70 which is not premature. -Recommend exercise nuclear stress test. This may be done today, or may be scheduled outpatient. -TTE pending, will be reviewed    Plan discussed with patient. Please call with questions. Thank you for your consult.     E

## 2021-10-19 NOTE — DISCHARGE SUMMARY
[facility] HOSPITALIST  DISCHARGE SUMMARY     Miles Garcia Jose Patient Status:  Observation    1960 MRN [id number]   [facility] 0SW-A Attending Maureen Morrison MD   [facility] Day # 0 PCP Chele Meredith DO     Date of Admission: 10/18/2021  Yaw (four) hours as needed for Wheezing. Quantity: 25.5 g  Refills: 2     CALTRATE 600+D OR      Take  by mouth. Refills: 0     CLARITIN OR      Take 1 tablet by mouth daily.    Refills: 0     Iron 325 (65 Fe) MG Tabs      Take 1 tablet by mouth 2 (two) joseph 136/78      -----------------------------------------------------------------------------------------------  PATIENT DISCHARGE INSTRUCTIONS: See electronic chart    Shannon Pearce MD    Time spent:  > 30 minutes

## 2021-10-19 NOTE — H&P
[facility] HOSPITALIST  History and Physical     Hakeem Garcia Patient Status:  Observation    1960 MRN [id number]   [facility] 0SW-A Attending Abbie Zee MD   Hosp Day # 0 PCP Popeye Villavicencio DO     Chief Complaint: chest pain (Other) Mother         non-alcoholic cirrhosis   • Heart Disorder Father         Allergies:   Pioglitazone            NAUSEA AND VOMITING    Comment:Per  pt ends up in ER when she has this             medication  Amaryl [Glimepiride]        Comment: Systems:   A comprehensive 14 point review of systems was completed. Pertinent positives and negatives noted in the HPI.     Physical Exam:    /66 (BP Location: Left arm)   Pulse 79   Temp 97.2 °F (36.2 °C) (Oral)   Resp 20   Ht 5' (1.524 m)   Wt 1 Markers  Recent Labs   Lab 10/18/21  2967   DDIMER <0.27       Imaging: Imaging data reviewed in Epic.       ASSESSMENT / PLAN:     Chest pain   Recheck troponin    Tele monitoring   Cardiology consultation   Echo, may need inpatient stress test  Essential

## 2021-10-19 NOTE — PLAN OF CARE
Patient is alert and approriate. Interactive and cooperative with care. She denies chest pain and shortness of breath. No signs of distress noted. Admitting orders per hospitalist. Follow up labs ordered . Cardiology consulted.  Pt to have echocardiogram and urine specific gravity, serum osmolarity and serum sodium as indicated or ordered  - Monitor response to interventions for patient's volume status, including labs, urine output, blood pressure (other measures as available)  - Encourage oral intake as appro

## 2021-10-19 NOTE — PLAN OF CARE
Nuclear stress test negative for ischemia. EF 48%. TTE with EF 60-65%, no rwmas, no sig valve disease. [address] Stephanie Rajput for [employer] from cardiology standpoint.      Kellie Oleary, JEAN PAUL  10/19/2021  4:20 PM

## 2021-10-19 NOTE — PROGRESS NOTES
Cardiac Diagnostic Note:    Pt walked 7:15 on Sabas protocol for stress nuclear test  Pt denied cardiac symptoms  No arrhythmias  nuc pending.

## 2021-10-19 NOTE — PLAN OF CARE
Admitted with cp.    States feeling has resolved  Encouraged to ambulate in hallway  Echo pending  Family at bedside  Refused scds & lovenox

## 2021-10-20 ENCOUNTER — PATIENT OUTREACH (OUTPATIENT)
Dept: CASE MANAGEMENT | Age: 61
End: 2021-10-20

## 2021-10-20 DIAGNOSIS — R07.9 CHEST PAIN, RULE OUT ACUTE MYOCARDIAL INFARCTION: ICD-10-CM

## 2021-10-20 DIAGNOSIS — Z02.9 ENCOUNTERS FOR UNSPECIFIED ADMINISTRATIVE PURPOSE: ICD-10-CM

## 2021-10-20 RX ORDER — LIOTHYRONINE SODIUM 25 UG/1
25 TABLET ORAL DAILY
COMMUNITY
End: 2021-11-08

## 2021-10-20 NOTE — PROGRESS NOTES
Initial Post Discharge Follow Up   Discharge Date: 10/19/21  Contact Date: 10/20/2021    Consent Verification:  Assessment Completed With: Patient  HIPAA Verified?   Yes    Discharge Dx:   Chest pain     Was TCC ordered: yes          General:   • How hav  (90 Base) MCG/ACT Inhalation Aero Soln Inhale 2 puffs into the lungs every 4 (four) hours as needed for Wheezing.  25.5 g 2   • omeprazole 20 MG Oral Capsule Delayed Release TAKE 1 CAPSULE TWICE A  capsule 3   • Liothyronine Sodium 25 MCG Ora disease concerns, Etc): No     Follow up appointments:  Pt declined TCC appt when offered. Attempted to schedule an appt with Dr. Nydia Prabhakar and pt declined stating she will call back to schedule herself soon.       PCP TCM/HFU appointment: scheduled at D/C with

## 2021-11-03 ENCOUNTER — OFFICE VISIT (OUTPATIENT)
Dept: FAMILY MEDICINE CLINIC | Facility: CLINIC | Age: 61
End: 2021-11-03

## 2021-11-03 VITALS
WEIGHT: 133.63 LBS | HEART RATE: 98 BPM | BODY MASS INDEX: 26 KG/M2 | OXYGEN SATURATION: 98 % | SYSTOLIC BLOOD PRESSURE: 132 MMHG | DIASTOLIC BLOOD PRESSURE: 86 MMHG | TEMPERATURE: 99 F

## 2021-11-03 DIAGNOSIS — I10 PRIMARY HYPERTENSION: ICD-10-CM

## 2021-11-03 DIAGNOSIS — R07.9 CHEST PAIN, RULE OUT ACUTE MYOCARDIAL INFARCTION: Primary | ICD-10-CM

## 2021-11-03 DIAGNOSIS — Z09 FOLLOW UP: ICD-10-CM

## 2021-11-03 DIAGNOSIS — E11.9 TYPE 2 DIABETES MELLITUS WITHOUT COMPLICATION, WITHOUT LONG-TERM CURRENT USE OF INSULIN (HCC): ICD-10-CM

## 2021-11-03 PROCEDURE — 99214 OFFICE O/P EST MOD 30 MIN: CPT | Performed by: NURSE PRACTITIONER

## 2021-11-03 PROCEDURE — 90471 IMMUNIZATION ADMIN: CPT | Performed by: NURSE PRACTITIONER

## 2021-11-03 PROCEDURE — 3075F SYST BP GE 130 - 139MM HG: CPT | Performed by: NURSE PRACTITIONER

## 2021-11-03 PROCEDURE — 90686 IIV4 VACC NO PRSV 0.5 ML IM: CPT | Performed by: NURSE PRACTITIONER

## 2021-11-03 PROCEDURE — 3079F DIAST BP 80-89 MM HG: CPT | Performed by: NURSE PRACTITIONER

## 2021-11-03 NOTE — PATIENT INSTRUCTIONS
firkt ekg was question able. There one day. THE [facility] in [place]. For observatoin.    STress the next day a

## 2021-11-03 NOTE — PROGRESS NOTES
Subjective:   Patient ID: Lisa Monroe is a 59year old female. HPI:   Lisa Monroe is a 59year old female here today for hospital follow up.    She was discharged from Inpatient hospital, [facility] to Home   Admission Date: 10/18/21 what is causing her nausea. Allergies:  She is allergic to pioglitazone, amaryl (glimepiride), ceclor, januvia (sitagliptin phosphate), and sulfa antibiotics.    Current Meds:  Current Outpatient Medications on File Prior to Visit:  liothyronine 25 MCG O mother; Diabetes in her mother; Heart Disorder in her father and mother; Hypertension in her mother; Lipids in her mother; Obesity in her mother; Other in her mother. She  reports that she has never smoked.  She has never used smokeless tobacco. She report motor and sensory are grossly intact    ASSESSMENT/ PLAN:  There are no diagnoses linked to this encounter.     Orders Placed This Encounter      Flulaval 6 months and older 0.5 ml PFS (83602)      Meds & Refills for this Visit:No prescriptions requested or TEST BLOOD SUGAR THREE TIMES A  strip 3   • ONETOUCH DELICA LANCETS FINE Does not apply Misc USE 1 LANCET BY FINGER STICK THREE TIMES A DAY 3 Box 3   • Loratadine (CLARITIN OR) Take 1 tablet by mouth daily.        • Multiple Vitamins-Minerals (Nora Starksks regarding her pulses medication she is currently taking. She thinks it may be causing her nausea, or singular? Gerard Carreon Would like to follow-up with PCP regarding Rybelsus medication. She is asked to continue to monitor symptoms.   Advise follow-up with PCP with

## 2021-11-05 ENCOUNTER — TELEPHONE (OUTPATIENT)
Dept: FAMILY MEDICINE CLINIC | Facility: CLINIC | Age: 61
End: 2021-11-05

## 2021-11-05 NOTE — TELEPHONE ENCOUNTER
Forward to Dr. Aixa Waller, please advise. I checked to see if we have samples, we did but not the dosage pt is taking. Samples we have are for 3mg tabs.

## 2021-11-05 NOTE — TELEPHONE ENCOUNTER
PT called she seen Ale Richardson on 11/03 and talk to her about Semaglutide (RYBELSUS) 7 MG Oral Tab and how it was making her nauseous. Pt states that Ale Richardson was going to talk with Dr. Gamez Neighbours about possibly changing the medication. Pt only has 3 pills left and reorders the medication. She went to pick it up at the pharmacy and it was $900. She did not want to pay that price for medication that she might be stopping. She has enough pills to get her to Monday. Pt is wanting to know what the dr recommends to do?     Pt advised that dr is out of office

## 2021-11-08 ENCOUNTER — TELEPHONE (OUTPATIENT)
Dept: FAMILY MEDICINE CLINIC | Facility: CLINIC | Age: 61
End: 2021-11-08

## 2021-11-08 RX ORDER — LIOTHYRONINE SODIUM 25 UG/1
TABLET ORAL
Qty: 90 TABLET | Refills: 3 | Status: SHIPPED | OUTPATIENT
Start: 2021-11-08

## 2021-11-08 NOTE — TELEPHONE ENCOUNTER
Thyroid Supplements Protocol Failed 11/08/2021 08:37 AM   Protocol Details  TSH test in past 12 months    TSH value between 0.350 and 5.500 IU/ml    Appointment in past 12 or next 3 months     Routing to provider per protocol.     Last refilled on 2/10/21 f

## 2021-11-08 NOTE — TELEPHONE ENCOUNTER
Pt came in office last week saw Wilmar gore was told PA will follow up with Darwin Yoon to see how he wanted to proceed regarding pt taking medication pt would like a call back     Semaglutide (RYBELSUS) 7 MG Oral Tab    Pt call back [phone]    Thank you

## 2021-11-08 NOTE — TELEPHONE ENCOUNTER
Pt states she is not worried about the copy- her biggest concern is that she doesn't plan to continue this medication if she is going to be sick all the time    Today was her last pill of the Rybelsus 7mg     Pt states her Avandia 4mg BID never made her si

## 2021-11-08 NOTE — TELEPHONE ENCOUNTER
So I can't understand why her copay went up so much, when last time it was 25$, and I'm running out of ideas about what to use that won;t make her sick.  So I sent a message to Micah Radford to see what our choices are

## 2021-11-09 NOTE — TELEPHONE ENCOUNTER
I would not take the pioglitazone, if it made her sick before it will do it again. The pioglitazone was at it's maximum dose and was not getting he to goal, and the Avandia was also at maximum dose and not on formulary.  So we're kind of stuck

## 2021-11-09 NOTE — TELEPHONE ENCOUNTER
Judy Astorga Nurse  Caller: Unspecified (Today, 12:42 PM)  PT RETURNED CALL.  PLEASE CALL BACK, THANKS     Pt was advised- she is to continue taking Metformin at this time and once we get a message from [address] us know if there is

## 2021-11-10 ENCOUNTER — TELEPHONE (OUTPATIENT)
Dept: FAMILY MEDICINE CLINIC | Facility: CLINIC | Age: 61
End: 2021-11-10

## 2021-11-10 DIAGNOSIS — E11.9 TYPE 2 DIABETES MELLITUS WITHOUT COMPLICATION, WITHOUT LONG-TERM CURRENT USE OF INSULIN (HCC): Primary | ICD-10-CM

## 2021-11-10 NOTE — TELEPHONE ENCOUNTER
Pt. LM after returning my call. She says she has eaten a few more carbs for lunch & eats a snack at night. Her BG level this am w/only Metformin was 117 mg/dL. We discussed following up w/BG readings on 11/19/21 before leaving for out of town. Will assess readings then. Pt. V/u & was agreeable w/plan.

## 2021-11-10 NOTE — TELEPHONE ENCOUNTER
Contacted pt.'s insurance; pharmacy benefit  Scripts @[phone]   Member ID# [id number]. Rep states that pt. is only allowed to refill a 30-day supply x2. She can get it refilled through [facility] mail order or a [facility] or [employer] . Message left for pt. regarding need to go to [employer] or [employer] for 30 or 90-day supply or through [facility]. Message sent to PCP w/information .

## 2021-11-10 NOTE — TELEPHONE ENCOUNTER
----- Message from Sudha Grider DO sent at 11/10/2021 11:01 AM CST -----  So notify Asad, I talked to [facility] and she wants to sit down with her and figure out what we can get for her to help keep her BS down, that won;t bankrupt her

## 2021-11-17 ENCOUNTER — TELEPHONE (OUTPATIENT)
Dept: ENDOCRINOLOGY CLINIC | Facility: CLINIC | Age: 61
End: 2021-11-17

## 2021-11-18 ENCOUNTER — TELEPHONE (OUTPATIENT)
Dept: ENDOCRINOLOGY CLINIC | Facility: CLINIC | Age: 61
End: 2021-11-18

## 2021-11-18 NOTE — TELEPHONE ENCOUNTER
Pt. contacted regarding BG readings; currently not taking Rybelsus because she is unable to tolerate. Current wt 130.2 lbs & has lost 8 lbs.      Date Breakfast   Lunch    Dinner   Bedtime Comments    Pre Insulin Units Post Pre Insulin Units Post Pre  Ins

## 2021-11-18 NOTE — TELEPHONE ENCOUNTER
Well if she can maintain her current efforts and her A1c comes into line then I'm OK with what she is doing.

## 2021-11-30 ENCOUNTER — TELEPHONE (OUTPATIENT)
Dept: FAMILY MEDICINE CLINIC | Facility: CLINIC | Age: 61
End: 2021-11-30

## 2021-11-30 RX ORDER — BLOOD SUGAR DIAGNOSTIC
STRIP MISCELLANEOUS
Qty: 300 EACH | Refills: 0 | Status: SHIPPED | OUTPATIENT
Start: 2021-11-30

## 2021-11-30 NOTE — TELEPHONE ENCOUNTER
Patient's  called requesting refill for:    Glucose Blood (ONETOUCH ULTRA BLUE) In Vitro Strip 300 strip     [facility], [facility] [phone], [phone]    Please advise # [phone]

## 2021-12-01 ENCOUNTER — TELEPHONE (OUTPATIENT)
Dept: FAMILY MEDICINE CLINIC | Facility: CLINIC | Age: 61
End: 2021-12-01

## 2021-12-01 NOTE — TELEPHONE ENCOUNTER
Received Diabetic Eye Exam from [facility]. Diabetic Retinopathy No    Done 11/30/21. Flowsheet updated. Sent for scanning.

## 2021-12-02 ENCOUNTER — MED REC SCAN ONLY (OUTPATIENT)
Dept: FAMILY MEDICINE CLINIC | Facility: CLINIC | Age: 61
End: 2021-12-02

## 2021-12-03 RX ORDER — PNV NO.95/FERROUS FUM/FOLIC AC 28MG-0.8MG
1 TABLET ORAL 2 TIMES DAILY
Qty: 180 TABLET | Refills: 1 | Status: SHIPPED | OUTPATIENT
Start: 2021-12-03

## 2021-12-03 NOTE — TELEPHONE ENCOUNTER
Ferrous Sulfate (IRON) 325 (65 Fe) MG Oral Tab    [id number] [place], [place] - [address] [place] [address] [phone], [phone]    Spouse would like a refill for pt verified pharmacy     Thank you

## 2021-12-09 ENCOUNTER — TELEPHONE (OUTPATIENT)
Dept: FAMILY MEDICINE CLINIC | Facility: CLINIC | Age: 61
End: 2021-12-09

## 2021-12-09 RX ORDER — LANCETS 33 GAUGE
1 EACH MISCELLANEOUS 3 TIMES DAILY
Qty: 300 EACH | Refills: 3 | Status: SHIPPED | OUTPATIENT
Start: 2021-12-09 | End: 2022-12-09

## 2021-12-09 NOTE — TELEPHONE ENCOUNTER
[address], [facility] [phone], [phone]    Lancets one touch     [facility] pt's  calling for a refill on lancets verified home delivery     Call back # [phone]

## 2022-01-24 ENCOUNTER — TELEPHONE (OUTPATIENT)
Dept: FAMILY MEDICINE CLINIC | Facility: CLINIC | Age: 62
End: 2022-01-24

## 2022-01-24 NOTE — TELEPHONE ENCOUNTER
Letter mailed to patient reminding her she is due for labs.     Lab Frequency Next Occurrence   CBC WITH DIFFERENTIAL WITH PLATELET Once 26/03/8324   HEMOGLOBIN A1C Once 09/16/2021   FERRITIN Once 09/16/2021   MICROALB/CREAT RATIO, RANDOM URINE Once 09/16/2

## 2022-02-21 ENCOUNTER — NURSE ONLY (OUTPATIENT)
Dept: FAMILY MEDICINE CLINIC | Facility: CLINIC | Age: 62
End: 2022-02-21
Payer: COMMERCIAL

## 2022-02-21 DIAGNOSIS — D64.9 ANEMIA, UNSPECIFIED TYPE: ICD-10-CM

## 2022-02-21 DIAGNOSIS — D50.9 IRON DEFICIENCY ANEMIA, UNSPECIFIED IRON DEFICIENCY ANEMIA TYPE: ICD-10-CM

## 2022-02-21 DIAGNOSIS — E11.9 TYPE 2 DIABETES MELLITUS WITHOUT COMPLICATION, WITHOUT LONG-TERM CURRENT USE OF INSULIN (HCC): ICD-10-CM

## 2022-02-21 LAB
BASOPHILS # BLD AUTO: 0.07 X10(3) UL (ref 0–0.2)
BASOPHILS NFR BLD AUTO: 1.1 %
CREAT UR-SCNC: <13 MG/DL
DEPRECATED HBV CORE AB SER IA-ACNC: 75.6 NG/ML
EOSINOPHIL # BLD AUTO: 0.42 X10(3) UL (ref 0–0.7)
EOSINOPHIL NFR BLD AUTO: 6.3 %
ERYTHROCYTE [DISTWIDTH] IN BLOOD BY AUTOMATED COUNT: 12.7 %
EST. AVERAGE GLUCOSE BLD GHB EST-MCNC: 154 MG/DL (ref 68–126)
HBA1C MFR BLD: 7 % (ref ?–5.7)
HCT VFR BLD AUTO: 36.2 %
HGB BLD-MCNC: 11.2 G/DL
IMM GRANULOCYTES # BLD AUTO: 0.01 X10(3) UL (ref 0–1)
IMM GRANULOCYTES NFR BLD: 0.2 %
LYMPHOCYTES # BLD AUTO: 2.38 X10(3) UL (ref 1–4)
LYMPHOCYTES NFR BLD AUTO: 35.8 %
MCH RBC QN AUTO: 29.7 PG (ref 26–34)
MCHC RBC AUTO-ENTMCNC: 30.9 G/DL (ref 31–37)
MCV RBC AUTO: 96 FL
MICROALBUMIN UR-MCNC: <0.5 MG/DL
MONOCYTES # BLD AUTO: 0.6 X10(3) UL (ref 0.1–1)
MONOCYTES NFR BLD AUTO: 9 %
NEUTROPHILS # BLD AUTO: 3.16 X10 (3) UL (ref 1.5–7.7)
NEUTROPHILS # BLD AUTO: 3.16 X10(3) UL (ref 1.5–7.7)
NEUTROPHILS NFR BLD AUTO: 47.6 %
PLATELET # BLD AUTO: 315 10(3)UL (ref 150–450)
RBC # BLD AUTO: 3.77 X10(6)UL
WBC # BLD AUTO: 6.6 X10(3) UL (ref 4–11)

## 2022-02-21 PROCEDURE — 83550 IRON BINDING TEST: CPT | Performed by: FAMILY MEDICINE

## 2022-02-21 PROCEDURE — 83036 HEMOGLOBIN GLYCOSYLATED A1C: CPT | Performed by: FAMILY MEDICINE

## 2022-02-21 PROCEDURE — 85025 COMPLETE CBC W/AUTO DIFF WBC: CPT | Performed by: FAMILY MEDICINE

## 2022-02-21 PROCEDURE — 82043 UR ALBUMIN QUANTITATIVE: CPT | Performed by: FAMILY MEDICINE

## 2022-02-21 PROCEDURE — 82570 ASSAY OF URINE CREATININE: CPT | Performed by: FAMILY MEDICINE

## 2022-02-21 PROCEDURE — 82728 ASSAY OF FERRITIN: CPT | Performed by: FAMILY MEDICINE

## 2022-02-21 PROCEDURE — 83540 ASSAY OF IRON: CPT | Performed by: FAMILY MEDICINE

## 2022-02-21 NOTE — PROGRESS NOTES
Patient presented for lab work ordered by Jose Heath. One gold and one lavender tubes drawn with a butterfly needle x one attempt in right ac space. Urine collected for urine microalbumin test Pt tolerated procedure well.

## 2022-02-23 ENCOUNTER — TELEPHONE (OUTPATIENT)
Dept: FAMILY MEDICINE CLINIC | Facility: CLINIC | Age: 62
End: 2022-02-23

## 2022-02-23 LAB
IRON SATN MFR SERPL: 20 %
IRON SERPL-MCNC: 73 UG/DL
TIBC SERPL-MCNC: 359 UG/DL (ref 240–450)
TRANSFERRIN SERPL-MCNC: 241 MG/DL (ref 200–360)

## 2022-02-23 NOTE — TELEPHONE ENCOUNTER
----- Message from Dru Duran DO sent at 2/23/2022  9:48 AM CST -----  Can we add, iron and TIBC, and Retic count

## 2022-02-26 ENCOUNTER — TELEPHONE (OUTPATIENT)
Dept: FAMILY MEDICINE CLINIC | Facility: CLINIC | Age: 62
End: 2022-02-26

## 2022-02-26 NOTE — TELEPHONE ENCOUNTER
----- Message from Khalif Cordova DO sent at 2/26/2022  9:02 AM CST -----  Can notify Dajuna Palacios, that her BS control went up just a bit, but not terrible, I know she is having trouble tolerating the new medication and if we could increase it I think that would help, but of not then lets focus on exercise 30-45 min 3-5 days a week, to help keep her number under control. Her irons stores have improved, she still has a low grade anemia. It looks like it's been a while since she's seen Dr. Francis Rasmussen. It may not be a bad idea to follow up with him, I see hr last EGD was 2017, and I can see she had a colonoscopy, but I can;t find the recommendation for repeat .  Lets recall A1c and LIPIDS in 6 months Addended by: TAMIA CRUZ on: 11/24/2017 05:20 PM     Modules accepted: Orders

## 2022-02-28 NOTE — TELEPHONE ENCOUNTER
Pt was advised of results- states she is no longer on Ryblesus- she could not tolerate. Pt states she spoke with Cuco OMER about this and she knew was off the medication. She is only taking Metfromin 500mg 2 tabs BID    Pt states her morning sugars are bad especially when she is stressed- she notices it more during th week.       Pt states she is still taking 1 iron pill a day right now

## 2022-02-28 NOTE — TELEPHONE ENCOUNTER
If she can tolerate 1000 mg bid of the metformin that would be helpful even if it was 1 po tid that would help

## 2022-02-28 NOTE — TELEPHONE ENCOUNTER
Pt states she would like to work on diet exercise and hopefully that will help her stress level    Pt states if she starts noticing a trend in her sugars she will let us know.

## 2022-02-28 NOTE — TELEPHONE ENCOUNTER
Oh got it I thought it was 500 bid, I think she may need to meet with KAN Mayorga again for options or really work on her weight and some exercise

## 2022-04-17 NOTE — TELEPHONE ENCOUNTER
Pt was advised that DS would like to see this in person    Verbalized understanding    Future Appointments   Date Time Provider Pam Littlejohn   6/3/2020  4:00 PM Jerzy Castaneda [facility] ZARA Pittman GYN

## 2022-05-25 RX ORDER — OMEPRAZOLE 20 MG/1
CAPSULE, DELAYED RELEASE ORAL
Qty: 180 CAPSULE | Refills: 3 | Status: SHIPPED | OUTPATIENT
Start: 2022-05-25

## 2022-07-16 DIAGNOSIS — E11.9 TYPE 2 DIABETES MELLITUS WITHOUT COMPLICATION, WITHOUT LONG-TERM CURRENT USE OF INSULIN (HCC): Primary | ICD-10-CM

## 2022-07-18 RX ORDER — BLOOD SUGAR DIAGNOSTIC
STRIP MISCELLANEOUS
Qty: 300 STRIP | Refills: 3 | Status: SHIPPED | OUTPATIENT
Start: 2022-07-18

## 2022-07-18 NOTE — TELEPHONE ENCOUNTER
Diabetic Supplies Protocol Passed 07/16/2022 11:15 AM    Appointment in the past 12 or next 3 months     LOV 9/15/21  Last refill 11/31/21 #300 0 refills

## 2022-08-11 ENCOUNTER — TELEPHONE (OUTPATIENT)
Dept: FAMILY MEDICINE CLINIC | Facility: CLINIC | Age: 62
End: 2022-08-11

## 2022-08-11 NOTE — TELEPHONE ENCOUNTER
Pt tested positive for COVID yesterday. Pt states sxs started yesterday- and she felt bad last night    No fever. Lots of nasal congestion/drainage and diarrhea today. Coughing- she states she is coughing so hard to get anything out. Pt wants to know if she can take Mucinex to loosen things up?

## 2022-08-11 NOTE — TELEPHONE ENCOUNTER
Sure that would be fine, if it gets worse let us know, there really isnt anything to bring up with this the cough is all due to inflammation in the tissue

## 2022-08-11 NOTE — TELEPHONE ENCOUNTER
Pt called she tested positive for covid yesterday. She wants to know if there is something specific the doctor wants her to do?

## 2022-08-12 ENCOUNTER — TELEPHONE (OUTPATIENT)
Dept: FAMILY MEDICINE CLINIC | Facility: CLINIC | Age: 62
End: 2022-08-12

## 2022-08-12 NOTE — TELEPHONE ENCOUNTER
Sent in RX for paxlovid, hold her metformin while she is taking this, stay hydrated, if Sx get worse to go to ER

## 2022-08-12 NOTE — TELEPHONE ENCOUNTER
Patient having cough and diarrhea    Tested positive for Covid yesterday    Would like rx    Please adv    Thank you

## 2022-09-06 ENCOUNTER — TELEPHONE (OUTPATIENT)
Dept: FAMILY MEDICINE CLINIC | Facility: CLINIC | Age: 62
End: 2022-09-06

## 2022-09-06 RX ORDER — PREDNISONE 10 MG/1
TABLET ORAL
Qty: 18 TABLET | Refills: 0 | Status: SHIPPED | OUTPATIENT
Start: 2022-09-06

## 2022-09-06 NOTE — TELEPHONE ENCOUNTER
Patient came in contact with poison ivy on either 9/2 or 9/3. Rash on both legs.  called to see if doctor could order medication for it.   Please call back at     [phone]

## 2022-10-04 RX ORDER — METFORMIN HYDROCHLORIDE 500 MG/1
TABLET, EXTENDED RELEASE ORAL
Qty: 360 TABLET | Refills: 3 | Status: SHIPPED | OUTPATIENT
Start: 2022-10-04

## 2022-10-04 RX ORDER — LISINOPRIL 10 MG/1
TABLET ORAL
Qty: 90 TABLET | Refills: 3 | Status: SHIPPED | OUTPATIENT
Start: 2022-10-04

## 2022-10-04 NOTE — TELEPHONE ENCOUNTER
Last refilled 9/27/21 for #30 with 3 RF  LOV with OP 11/3/21  No future appt with DS  Last Micro & A1C 2/21/22    Diabetic Medication Protocol Failed 10/03/2022 09:02 PM   Protocol Details  HgBA1C procedure resulted in past 6 months    Last HgBA1C < 7.5    Appointment in past 6 or next 3 months    Microalbumin procedure in past 12 months or taking ACE/ARB
No change

## 2022-10-04 NOTE — TELEPHONE ENCOUNTER
Last refilled 9/15/20 for #90 with 3 RF  LOV with OP 11/3/21  No future appt with DS  Last CMP 10/18/21     Hypertension Medications Protocol Failed 10/04/2022 07:01 AM   Protocol Details  Appointment in past 6 or next 3 months    CMP or BMP in past 12 months    Last serum creatinine< 2.0

## 2022-10-06 ENCOUNTER — TELEPHONE (OUTPATIENT)
Dept: FAMILY MEDICINE CLINIC | Facility: CLINIC | Age: 62
End: 2022-10-06

## 2022-10-06 DIAGNOSIS — E11.9 TYPE 2 DIABETES MELLITUS WITHOUT COMPLICATION, WITHOUT LONG-TERM CURRENT USE OF INSULIN (HCC): Primary | ICD-10-CM

## 2022-10-08 ENCOUNTER — TELEPHONE (OUTPATIENT)
Dept: FAMILY MEDICINE CLINIC | Facility: CLINIC | Age: 62
End: 2022-10-08

## 2022-10-08 RX ORDER — BLOOD-GLUCOSE METER
EACH MISCELLANEOUS
Qty: 1 KIT | Refills: 0 | Status: SHIPPED | OUTPATIENT
Start: 2022-10-08

## 2022-10-08 NOTE — TELEPHONE ENCOUNTER
[address] [facility] [phone], [address] Samira Martínez would like Climmie Kussmaul to place prescription for wife to get a glucose meter One Touch Ultra 2 would like to go through her insurance       Call back # [phone]    Thank you

## 2022-10-18 ENCOUNTER — NURSE ONLY (OUTPATIENT)
Dept: FAMILY MEDICINE CLINIC | Facility: CLINIC | Age: 62
End: 2022-10-18
Payer: COMMERCIAL

## 2022-10-18 DIAGNOSIS — D64.9 ANEMIA, UNSPECIFIED TYPE: ICD-10-CM

## 2022-10-18 DIAGNOSIS — E11.9 TYPE 2 DIABETES MELLITUS WITHOUT COMPLICATION, WITHOUT LONG-TERM CURRENT USE OF INSULIN (HCC): ICD-10-CM

## 2022-10-18 DIAGNOSIS — E78.00 PURE HYPERCHOLESTEROLEMIA: ICD-10-CM

## 2022-10-18 LAB
CHOLEST SERPL-MCNC: 197 MG/DL (ref ?–200)
EST. AVERAGE GLUCOSE BLD GHB EST-MCNC: 157 MG/DL (ref 68–126)
FASTING PATIENT LIPID ANSWER: YES
HBA1C MFR BLD: 7.1 % (ref ?–5.7)
HDLC SERPL-MCNC: 82 MG/DL (ref 40–59)
HGB RETIC QN AUTO: 36.2 PG (ref 28.2–36.6)
IMM RETICS NFR: 0.08 RATIO (ref 0.1–0.3)
LDLC SERPL CALC-MCNC: 102 MG/DL (ref ?–100)
NONHDLC SERPL-MCNC: 115 MG/DL (ref ?–130)
RETICS # AUTO: 39.5 X10(3) UL (ref 22.5–147.5)
RETICS/RBC NFR AUTO: 1.1 %
TRIGL SERPL-MCNC: 70 MG/DL (ref 30–149)
VLDLC SERPL CALC-MCNC: 12 MG/DL (ref 0–30)

## 2022-10-18 PROCEDURE — 80061 LIPID PANEL: CPT | Performed by: FAMILY MEDICINE

## 2022-10-18 PROCEDURE — 85045 AUTOMATED RETICULOCYTE COUNT: CPT | Performed by: FAMILY MEDICINE

## 2022-10-18 PROCEDURE — 83036 HEMOGLOBIN GLYCOSYLATED A1C: CPT | Performed by: FAMILY MEDICINE

## 2022-10-18 NOTE — PROGRESS NOTES
Patient in office for labs per DS     Venipuncture site right [facility] with a 25G needle collected a mint and lavender top     Patient left in stable condition with no concerns at this time.

## 2022-10-19 ENCOUNTER — TELEPHONE (OUTPATIENT)
Dept: FAMILY MEDICINE CLINIC | Facility: CLINIC | Age: 62
End: 2022-10-19

## 2022-10-19 NOTE — TELEPHONE ENCOUNTER
Spoke with patients, aware of results. Patient is not taking anything.     Future Appointments   Date Time Provider Pam Littlejohn   12/12/2022  5:00 PM Taniya Mixon [facility] ZARA Recinos

## 2022-10-19 NOTE — TELEPHONE ENCOUNTER
----- Message from Arthor Mcburney, DO sent at 10/19/2022  3:29 PM CDT -----  Can notify Dameon Valderrama her A1c is holding steady her cholesterol though needs some help should be 170 and her LDL should be 70 or less, I thought she was taking something previously?

## 2022-11-03 RX ORDER — LIOTHYRONINE SODIUM 25 UG/1
TABLET ORAL
Qty: 90 TABLET | Refills: 3 | Status: SHIPPED | OUTPATIENT
Start: 2022-11-03

## 2022-11-03 NOTE — TELEPHONE ENCOUNTER
Thyroid Supplements Protocol Failed 11/03/2022 12:13 AM   Protocol Details  TSH test in past 12 months    TSH value between 0.350 and 5.500 IU/ml    Appointment in past 12 or next 3 months     LOV: 11/3/21   Last Refill: 11/08/21 #90 3 RF    Future Appointments   Date Time Provider Pam Littlejohn   12/12/2022  5:00 PM Ariel Sosa [facility] Mary Brenner

## 2022-12-12 ENCOUNTER — OFFICE VISIT (OUTPATIENT)
Dept: FAMILY MEDICINE CLINIC | Facility: CLINIC | Age: 62
End: 2022-12-12
Payer: COMMERCIAL

## 2022-12-12 VITALS
DIASTOLIC BLOOD PRESSURE: 80 MMHG | HEIGHT: 60 IN | HEART RATE: 79 BPM | TEMPERATURE: 97 F | SYSTOLIC BLOOD PRESSURE: 130 MMHG | RESPIRATION RATE: 18 BRPM | BODY MASS INDEX: 27.09 KG/M2 | OXYGEN SATURATION: 98 % | WEIGHT: 138 LBS

## 2022-12-12 DIAGNOSIS — Z00.00 ROUTINE HEALTH MAINTENANCE: Primary | ICD-10-CM

## 2022-12-12 DIAGNOSIS — E11.9 TYPE 2 DIABETES MELLITUS WITHOUT COMPLICATION, WITHOUT LONG-TERM CURRENT USE OF INSULIN (HCC): ICD-10-CM

## 2022-12-12 LAB
ALBUMIN SERPL-MCNC: 4 G/DL (ref 3.4–5)
ALBUMIN/GLOB SERPL: 1.4 {RATIO} (ref 1–2)
ALP LIVER SERPL-CCNC: 71 U/L
ALT SERPL-CCNC: 24 U/L
ANION GAP SERPL CALC-SCNC: 4 MMOL/L (ref 0–18)
AST SERPL-CCNC: 13 U/L (ref 15–37)
BASOPHILS # BLD AUTO: 0.05 X10(3) UL (ref 0–0.2)
BASOPHILS NFR BLD AUTO: 0.8 %
BILIRUB SERPL-MCNC: 0.4 MG/DL (ref 0.1–2)
BUN BLD-MCNC: 21 MG/DL (ref 7–18)
CALCIUM BLD-MCNC: 9.5 MG/DL (ref 8.5–10.1)
CHLORIDE SERPL-SCNC: 106 MMOL/L (ref 98–112)
CO2 SERPL-SCNC: 29 MMOL/L (ref 21–32)
CREAT BLD-MCNC: 0.86 MG/DL
EOSINOPHIL # BLD AUTO: 0.38 X10(3) UL (ref 0–0.7)
EOSINOPHIL NFR BLD AUTO: 6.2 %
ERYTHROCYTE [DISTWIDTH] IN BLOOD BY AUTOMATED COUNT: 12 %
FASTING STATUS PATIENT QL REPORTED: NO
GFR SERPLBLD BASED ON 1.73 SQ M-ARVRAT: 75 ML/MIN/1.73M2 (ref 60–?)
GLOBULIN PLAS-MCNC: 2.9 G/DL (ref 2.8–4.4)
GLUCOSE BLD-MCNC: 122 MG/DL (ref 70–99)
HCT VFR BLD AUTO: 34.8 %
HGB BLD-MCNC: 11 G/DL
IMM GRANULOCYTES # BLD AUTO: 0.01 X10(3) UL (ref 0–1)
IMM GRANULOCYTES NFR BLD: 0.2 %
LYMPHOCYTES # BLD AUTO: 2.49 X10(3) UL (ref 1–4)
LYMPHOCYTES NFR BLD AUTO: 40.4 %
MCH RBC QN AUTO: 30.1 PG (ref 26–34)
MCHC RBC AUTO-ENTMCNC: 31.6 G/DL (ref 31–37)
MCV RBC AUTO: 95.3 FL
MONOCYTES # BLD AUTO: 0.66 X10(3) UL (ref 0.1–1)
MONOCYTES NFR BLD AUTO: 10.7 %
NEUTROPHILS # BLD AUTO: 2.58 X10 (3) UL (ref 1.5–7.7)
NEUTROPHILS # BLD AUTO: 2.58 X10(3) UL (ref 1.5–7.7)
NEUTROPHILS NFR BLD AUTO: 41.7 %
OSMOLALITY SERPL CALC.SUM OF ELEC: 292 MOSM/KG (ref 275–295)
PLATELET # BLD AUTO: 334 10(3)UL (ref 150–450)
POTASSIUM SERPL-SCNC: 4.5 MMOL/L (ref 3.5–5.1)
PROT SERPL-MCNC: 6.9 G/DL (ref 6.4–8.2)
RBC # BLD AUTO: 3.65 X10(6)UL
SODIUM SERPL-SCNC: 139 MMOL/L (ref 136–145)
T4 FREE SERPL-MCNC: 0.4 NG/DL (ref 0.8–1.7)
TSI SER-ACNC: 0.52 MIU/ML (ref 0.36–3.74)
WBC # BLD AUTO: 6.2 X10(3) UL (ref 4–11)

## 2022-12-12 PROCEDURE — 84443 ASSAY THYROID STIM HORMONE: CPT | Performed by: FAMILY MEDICINE

## 2022-12-12 PROCEDURE — 84439 ASSAY OF FREE THYROXINE: CPT | Performed by: FAMILY MEDICINE

## 2022-12-12 PROCEDURE — 85025 COMPLETE CBC W/AUTO DIFF WBC: CPT | Performed by: FAMILY MEDICINE

## 2022-12-12 PROCEDURE — 80053 COMPREHEN METABOLIC PANEL: CPT | Performed by: FAMILY MEDICINE

## 2022-12-12 RX ORDER — ATORVASTATIN CALCIUM 10 MG/1
5 TABLET, FILM COATED ORAL NIGHTLY
Qty: 45 TABLET | Refills: 0 | Status: SHIPPED | OUTPATIENT
Start: 2022-12-12 | End: 2023-01-26

## 2022-12-13 ENCOUNTER — TELEPHONE (OUTPATIENT)
Dept: FAMILY MEDICINE CLINIC | Facility: CLINIC | Age: 62
End: 2022-12-13

## 2022-12-13 DIAGNOSIS — E03.4 HYPOTHYROIDISM DUE TO ACQUIRED ATROPHY OF THYROID: Primary | ICD-10-CM

## 2022-12-13 LAB
CREAT UR-SCNC: 36.7 MG/DL
MICROALBUMIN UR-MCNC: 0.54 MG/DL
MICROALBUMIN/CREAT 24H UR-RTO: 14.7 UG/MG (ref ?–30)

## 2022-12-13 PROCEDURE — 82570 ASSAY OF URINE CREATININE: CPT | Performed by: FAMILY MEDICINE

## 2022-12-13 PROCEDURE — 82043 UR ALBUMIN QUANTITATIVE: CPT | Performed by: FAMILY MEDICINE

## 2022-12-13 RX ORDER — LEVOTHYROXINE SODIUM 0.05 MG/1
50 TABLET ORAL
Qty: 30 TABLET | Refills: 1 | Status: SHIPPED | OUTPATIENT
Start: 2022-12-13 | End: 2022-12-16 | Stop reason: CLARIF

## 2022-12-13 RX ORDER — ROSUVASTATIN CALCIUM 5 MG/1
5 TABLET, COATED ORAL NIGHTLY
Qty: 30 TABLET | Refills: 5 | Status: SHIPPED | OUTPATIENT
Start: 2022-12-13 | End: 2023-01-12

## 2022-12-13 NOTE — TELEPHONE ENCOUNTER
Detailed message left for pt and spouse with new medication sent in    Office sherrimber provided- for follow up questions

## 2022-12-13 NOTE — TELEPHONE ENCOUNTER
SPOUSE CALLED AND ADV THAT PT IS NOT ABLE TO TAKE :     ATORVASTATIN - BACK IN 2020, PT ADV WAS ON THIS AND GOT TERRIBLE LEG CRAMPS.    WAS WONDERING IF SOMETHING ELSE CAN BE CALLED IN.     PLEASE ADV    THANK YOU

## 2022-12-16 ENCOUNTER — TELEPHONE (OUTPATIENT)
Dept: FAMILY MEDICINE CLINIC | Facility: CLINIC | Age: 62
End: 2022-12-16

## 2022-12-16 RX ORDER — LIOTHYRONINE SODIUM 50 UG/1
50 TABLET ORAL DAILY
Qty: 90 TABLET | Refills: 0 | Status: SHIPPED | OUTPATIENT
Start: 2022-12-16

## 2022-12-16 NOTE — TELEPHONE ENCOUNTER
Per  patient has previously been on Liothyronine 25 mcg.     Script sent 12/13/22 was for levothyroxine    Please advise if should be liothyronine 50 mcg or if changing to or adding levothyroxine

## 2022-12-16 NOTE — TELEPHONE ENCOUNTER
See last labs    Dr recommended a dosage change of thyroid med    Patient is leaving out of town for 2-3 weeks tomorrow and is concerned about effects of dosage change while away    She is wanting to know if it can wait until she returns    Spouse aware dr Matthew Castano out until tomorrow    Please adv  Thank you

## 2022-12-16 NOTE — TELEPHONE ENCOUNTER
Patient  notified and verbalized understanding. States patient has several liothyronine 25 mcg tabs. Will take 2 daily to use up current supply. Requests script for liothyronine 50mcg sent to [facility].    Script sent

## 2023-01-11 RX ORDER — PNV NO.95/FERROUS FUM/FOLIC AC 28MG-0.8MG
1 TABLET ORAL 2 TIMES DAILY
Qty: 180 TABLET | Refills: 1 | Status: SHIPPED | OUTPATIENT
Start: 2023-01-11

## 2023-01-11 NOTE — TELEPHONE ENCOUNTER
Routing to provider per protocol. Ferrous Sulfate (IRON) 325 (65 Fe) MG Oral Tab  Last refilled on 12/3/21 for #180  with 1 rf. Last labs 12/12/22. Last seen on 12/12/22. No future appointments. Thank you.

## 2023-01-11 NOTE — TELEPHONE ENCOUNTER
SPOUSE STOPPED IN AND ADV PT NEEDS REFILLS OF     Ferrous Sulfate (IRON) 325 (65 Fe) MG Oral Tab    PLEASE SEND TO Kacie Blackburn

## 2023-01-26 ENCOUNTER — OFFICE VISIT (OUTPATIENT)
Dept: FAMILY MEDICINE CLINIC | Facility: CLINIC | Age: 63
End: 2023-01-26
Payer: COMMERCIAL

## 2023-01-26 ENCOUNTER — TELEPHONE (OUTPATIENT)
Dept: FAMILY MEDICINE CLINIC | Facility: CLINIC | Age: 63
End: 2023-01-26

## 2023-01-26 VITALS
BODY MASS INDEX: 27 KG/M2 | RESPIRATION RATE: 18 BRPM | SYSTOLIC BLOOD PRESSURE: 124 MMHG | DIASTOLIC BLOOD PRESSURE: 86 MMHG | OXYGEN SATURATION: 97 % | TEMPERATURE: 98 F | WEIGHT: 140.25 LBS | HEART RATE: 88 BPM

## 2023-01-26 DIAGNOSIS — J34.0 CELLULITIS OF NOSE: ICD-10-CM

## 2023-01-26 DIAGNOSIS — Z86.14 HISTORY OF MRSA INFECTION: ICD-10-CM

## 2023-01-26 DIAGNOSIS — J34.89 PAIN OF NOSE: Primary | ICD-10-CM

## 2023-01-26 PROCEDURE — 3079F DIAST BP 80-89 MM HG: CPT | Performed by: FAMILY MEDICINE

## 2023-01-26 PROCEDURE — 87081 CULTURE SCREEN ONLY: CPT | Performed by: FAMILY MEDICINE

## 2023-01-26 PROCEDURE — 3074F SYST BP LT 130 MM HG: CPT | Performed by: FAMILY MEDICINE

## 2023-01-26 PROCEDURE — 99213 OFFICE O/P EST LOW 20 MIN: CPT | Performed by: FAMILY MEDICINE

## 2023-01-26 NOTE — TELEPHONE ENCOUNTER
PND is the worst at night when she is laying down    Pt has no temp but to touch her nose it is painful- she states it is red, painful. Pt states her nose is dripping constantly and then it get crusty, and full. Pt has not tried anything inside her nose- but the inside of her nose is dry and hurting.     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

## 2023-01-26 NOTE — TELEPHONE ENCOUNTER
Pt states that she thinks she has a sinus infection. Nose is very sore to the touch. Does not think it is from blowing nose. Would like to get on an antibiotic. No real coughing except sometimes at night. Nose is crusty. Please advise. Thank you!     [id number] - [place] - [address] [place] [address] [phone], [phone]   [address] [place] [address] Street   Phone: [phone] Fax: [phone]

## 2023-01-26 NOTE — TELEPHONE ENCOUNTER
Well unless she is blowing thick green or yellow out of her nose, has a fever or facial pain.  But a sore nose doesn;t qite qualify, have her use some flonase and some plain mucinex for a couple of days and lets see how it goes, if any of the above develop let us know

## 2023-01-27 ENCOUNTER — MOBILE ENCOUNTER (OUTPATIENT)
Dept: FAMILY MEDICINE CLINIC | Facility: CLINIC | Age: 63
End: 2023-01-27

## 2023-01-27 ENCOUNTER — TELEPHONE (OUTPATIENT)
Dept: FAMILY MEDICINE CLINIC | Facility: CLINIC | Age: 63
End: 2023-01-27

## 2023-01-27 RX ORDER — SULFAMETHOXAZOLE AND TRIMETHOPRIM 800; 160 MG/1; MG/1
1 TABLET ORAL 2 TIMES DAILY
Qty: 20 TABLET | Refills: 0 | Status: CANCELLED
Start: 2023-01-27 | End: 2023-02-06

## 2023-01-27 NOTE — TELEPHONE ENCOUNTER
Sarthak Canales, DO  You 14 minutes ago (9:57 AM)     Ok to substitute same instructions      Pharmacy has been notified

## 2023-01-27 NOTE — TELEPHONE ENCOUNTER
Spoke with Clementine at [facility] who states Mupirocin 2% nasal ointment or cream is not available. Will need to dispense Mupirocin 2% ointment 22g. States it is the same medication but is in a larger tube and does not say nasal on the tube.     LOPEZ DASILVA

## 2023-01-27 NOTE — TELEPHONE ENCOUNTER
JENNY FROM [facility] CALLED AND ADV THAT THE NOSE OINTMENT IS NOT AVAILABLE AND WOULD LIKE TO OFFER ALTERNATE     PLEASE CALL [facility] [address]

## 2023-01-27 NOTE — TELEPHONE ENCOUNTER
Pt's spouse called in regards to her medication.  he is wanting to know if we know what is being sent in?

## 2023-01-28 ENCOUNTER — TELEPHONE (OUTPATIENT)
Dept: FAMILY MEDICINE CLINIC | Facility: CLINIC | Age: 63
End: 2023-01-28

## 2023-01-28 ENCOUNTER — MOBILE ENCOUNTER (OUTPATIENT)
Dept: FAMILY MEDICINE CLINIC | Facility: CLINIC | Age: 63
End: 2023-01-28

## 2023-01-28 RX ORDER — SULFAMETHOXAZOLE AND TRIMETHOPRIM 800; 160 MG/1; MG/1
1 TABLET ORAL 2 TIMES DAILY
Qty: 20 TABLET | Refills: 0 | Status: CANCELLED | OUTPATIENT
Start: 2023-01-28 | End: 2023-02-07

## 2023-01-28 RX ORDER — CLINDAMYCIN HYDROCHLORIDE 300 MG/1
300 CAPSULE ORAL 3 TIMES DAILY
Qty: 30 CAPSULE | Refills: 0 | Status: SHIPPED | OUTPATIENT
Start: 2023-01-28 | End: 2023-02-07

## 2023-01-28 NOTE — TELEPHONE ENCOUNTER
Patient received test results via My Chart this morning    Shows positive for Staph infection    Patient states she was told antibiotics would be needed right away if lab came back positive     Please send RX to [facility] if appropriate    Please adv  Thank you

## 2023-01-28 NOTE — TELEPHONE ENCOUNTER
Sent in clindamycin 300 tid for 10 days per Dr. Susana Diaz    Left detailed message on pt's voicemail.

## 2023-02-25 ENCOUNTER — PATIENT MESSAGE (OUTPATIENT)
Dept: FAMILY MEDICINE CLINIC | Facility: CLINIC | Age: 63
End: 2023-02-25

## 2023-02-25 ENCOUNTER — OFFICE VISIT (OUTPATIENT)
Dept: FAMILY MEDICINE CLINIC | Facility: CLINIC | Age: 63
End: 2023-02-25
Payer: COMMERCIAL

## 2023-02-25 VITALS
OXYGEN SATURATION: 98 % | BODY MASS INDEX: 27 KG/M2 | WEIGHT: 138.19 LBS | SYSTOLIC BLOOD PRESSURE: 120 MMHG | DIASTOLIC BLOOD PRESSURE: 80 MMHG | TEMPERATURE: 99 F | HEART RATE: 107 BPM | RESPIRATION RATE: 16 BRPM

## 2023-02-25 DIAGNOSIS — R00.2 PALPITATIONS: Primary | ICD-10-CM

## 2023-02-25 DIAGNOSIS — E03.9 PRIMARY HYPOTHYROIDISM: ICD-10-CM

## 2023-02-25 DIAGNOSIS — R25.1 TREMOR: ICD-10-CM

## 2023-02-25 DIAGNOSIS — E03.4 HYPOTHYROIDISM DUE TO ACQUIRED ATROPHY OF THYROID: ICD-10-CM

## 2023-02-25 LAB
ALBUMIN SERPL-MCNC: 3.9 G/DL (ref 3.4–5)
ALBUMIN/GLOB SERPL: 1.3 {RATIO} (ref 1–2)
ALP LIVER SERPL-CCNC: 65 U/L
ALT SERPL-CCNC: 38 U/L
ANION GAP SERPL CALC-SCNC: 6 MMOL/L (ref 0–18)
AST SERPL-CCNC: 19 U/L (ref 15–37)
BILIRUB SERPL-MCNC: 0.8 MG/DL (ref 0.1–2)
BUN BLD-MCNC: 25 MG/DL (ref 7–18)
CALCIUM BLD-MCNC: 10 MG/DL (ref 8.5–10.1)
CHLORIDE SERPL-SCNC: 106 MMOL/L (ref 98–112)
CO2 SERPL-SCNC: 26 MMOL/L (ref 21–32)
CREAT BLD-MCNC: 0.91 MG/DL
FASTING STATUS PATIENT QL REPORTED: YES
GFR SERPLBLD BASED ON 1.73 SQ M-ARVRAT: 70 ML/MIN/1.73M2 (ref 60–?)
GLOBULIN PLAS-MCNC: 3.1 G/DL (ref 2.8–4.4)
GLUCOSE BLD-MCNC: 245 MG/DL (ref 70–99)
OSMOLALITY SERPL CALC.SUM OF ELEC: 299 MOSM/KG (ref 275–295)
POTASSIUM SERPL-SCNC: 4.9 MMOL/L (ref 3.5–5.1)
PROT SERPL-MCNC: 7 G/DL (ref 6.4–8.2)
SODIUM SERPL-SCNC: 138 MMOL/L (ref 136–145)
T4 FREE SERPL-MCNC: 0.3 NG/DL (ref 0.8–1.7)
TSI SER-ACNC: <0.005 MIU/ML (ref 0.36–3.74)

## 2023-02-25 PROCEDURE — 84439 ASSAY OF FREE THYROXINE: CPT | Performed by: FAMILY MEDICINE

## 2023-02-25 PROCEDURE — 80053 COMPREHEN METABOLIC PANEL: CPT | Performed by: FAMILY MEDICINE

## 2023-02-25 PROCEDURE — 3079F DIAST BP 80-89 MM HG: CPT | Performed by: FAMILY MEDICINE

## 2023-02-25 PROCEDURE — 84443 ASSAY THYROID STIM HORMONE: CPT | Performed by: FAMILY MEDICINE

## 2023-02-25 PROCEDURE — 99214 OFFICE O/P EST MOD 30 MIN: CPT | Performed by: FAMILY MEDICINE

## 2023-02-25 PROCEDURE — 3074F SYST BP LT 130 MM HG: CPT | Performed by: FAMILY MEDICINE

## 2023-02-25 RX ORDER — ROSUVASTATIN CALCIUM 5 MG/1
5 TABLET, COATED ORAL NIGHTLY
Qty: 30 TABLET | Refills: 5 | COMMUNITY
Start: 2023-02-25

## 2023-02-27 ENCOUNTER — TELEPHONE (OUTPATIENT)
Dept: FAMILY MEDICINE CLINIC | Facility: CLINIC | Age: 63
End: 2023-02-27

## 2023-02-27 DIAGNOSIS — E03.9 PRIMARY HYPOTHYROIDISM: Primary | ICD-10-CM

## 2023-02-27 RX ORDER — LIOTHYRONINE SODIUM 50 UG/1
25 TABLET ORAL DAILY
Qty: 90 TABLET | Refills: 0 | COMMUNITY
Start: 2023-02-27

## 2023-02-27 NOTE — TELEPHONE ENCOUNTER
----- Message from Kathrin Mcgrath DO sent at 2/27/2023  8:16 AM CST -----  See my chart message, reducing thyroid medication to 25 mcg

## 2023-03-13 ENCOUNTER — OFFICE VISIT (OUTPATIENT)
Dept: FAMILY MEDICINE CLINIC | Facility: CLINIC | Age: 63
End: 2023-03-13
Payer: COMMERCIAL

## 2023-03-13 VITALS
HEART RATE: 73 BPM | OXYGEN SATURATION: 100 % | BODY MASS INDEX: 27 KG/M2 | RESPIRATION RATE: 18 BRPM | TEMPERATURE: 99 F | WEIGHT: 140 LBS | SYSTOLIC BLOOD PRESSURE: 136 MMHG | DIASTOLIC BLOOD PRESSURE: 80 MMHG

## 2023-03-13 DIAGNOSIS — R05.1 ACUTE COUGH: ICD-10-CM

## 2023-03-13 DIAGNOSIS — J18.9 PNEUMONIA OF LEFT LOWER LOBE DUE TO INFECTIOUS ORGANISM: Primary | ICD-10-CM

## 2023-03-13 DIAGNOSIS — R53.83 OTHER FATIGUE: ICD-10-CM

## 2023-03-13 PROCEDURE — 3075F SYST BP GE 130 - 139MM HG: CPT | Performed by: FAMILY MEDICINE

## 2023-03-13 PROCEDURE — 3079F DIAST BP 80-89 MM HG: CPT | Performed by: FAMILY MEDICINE

## 2023-03-13 PROCEDURE — 99214 OFFICE O/P EST MOD 30 MIN: CPT | Performed by: FAMILY MEDICINE

## 2023-03-13 RX ORDER — AMOXICILLIN AND CLAVULANATE POTASSIUM 500; 125 MG/1; MG/1
1 TABLET, FILM COATED ORAL 2 TIMES DAILY
Qty: 20 TABLET | Refills: 0 | Status: SHIPPED | OUTPATIENT
Start: 2023-03-13 | End: 2023-03-23

## 2023-03-14 ENCOUNTER — TELEPHONE (OUTPATIENT)
Dept: FAMILY MEDICINE CLINIC | Facility: CLINIC | Age: 63
End: 2023-03-14

## 2023-03-14 NOTE — TELEPHONE ENCOUNTER
Per DS continue abx, treat sxs and no need to test again. Call if sx do not improve or worsen.     Patient v/u

## 2023-03-14 NOTE — TELEPHONE ENCOUNTER
[facility][id number] - [place] - [address] [place] [address] [phone], [phone]   [address] [place] [address] [place] [address]   Phone: [phone] Fax: [phone]   Hours: Not open 24 hours     PATIENT SAYS SHE SAW DR DASILVA YESTERDAY IN OFFICE. PATIENT SAYS SHE JUST TOOK A COVID HOME TEST AND TESTED POSITIVE. PATIENT ASKING DR DASILVA IF THAT CHANGES ANYTHING WITH THE MEDICATION PRESCRIBED YESTERDAY. SHE SAYS SHE IS ON AN ANTIBIOTIC. PATIENT ALSO ASKING IF OR WHEN SHE SHOULD TAKE ANOTHER COVID TEST BECAUSE SOMETIMES THEY COME BACK WITH A FALSE/POSITIVE RESULT.

## 2023-03-20 ENCOUNTER — TELEPHONE (OUTPATIENT)
Dept: FAMILY MEDICINE CLINIC | Facility: CLINIC | Age: 63
End: 2023-03-20

## 2023-03-20 RX ORDER — FLUCONAZOLE 150 MG/1
TABLET ORAL
Qty: 2 TABLET | Refills: 0 | Status: SHIPPED | OUTPATIENT
Start: 2023-03-20 | End: 2023-03-23

## 2023-03-20 NOTE — TELEPHONE ENCOUNTER
Pt states she has a Yeast infection. Has had in the past - every time she is on an antibiotic. Taking antibiotics until Thursday. Can she have a prescription called in for the yeast infection? FYI: Pt has an appt on Thursday. [facility][id number] - [place] - [address] [facility], [phone]   [address] [place] [address]   Phone: [phone] Fax: [phone]     Thank you!     Future Appointments   Date Time Provider Pam Littlejohn   3/23/2023  9:20 AM Kenny Pitt, [facility]

## 2023-03-23 ENCOUNTER — OFFICE VISIT (OUTPATIENT)
Dept: FAMILY MEDICINE CLINIC | Facility: CLINIC | Age: 63
End: 2023-03-23
Payer: COMMERCIAL

## 2023-03-23 VITALS
WEIGHT: 137.63 LBS | HEART RATE: 87 BPM | BODY MASS INDEX: 27 KG/M2 | TEMPERATURE: 99 F | DIASTOLIC BLOOD PRESSURE: 82 MMHG | SYSTOLIC BLOOD PRESSURE: 112 MMHG | OXYGEN SATURATION: 99 %

## 2023-03-23 DIAGNOSIS — R05.1 ACUTE COUGH: ICD-10-CM

## 2023-03-23 DIAGNOSIS — J18.9 PNEUMONIA OF LEFT LOWER LOBE DUE TO INFECTIOUS ORGANISM: Primary | ICD-10-CM

## 2023-03-23 PROBLEM — K59.1 FUNCTIONAL DIARRHEA: Status: ACTIVE | Noted: 2023-03-23

## 2023-03-23 PROCEDURE — 99213 OFFICE O/P EST LOW 20 MIN: CPT | Performed by: FAMILY MEDICINE

## 2023-03-23 PROCEDURE — 3074F SYST BP LT 130 MM HG: CPT | Performed by: FAMILY MEDICINE

## 2023-03-23 PROCEDURE — 3079F DIAST BP 80-89 MM HG: CPT | Performed by: FAMILY MEDICINE

## 2023-05-08 ENCOUNTER — TELEPHONE (OUTPATIENT)
Dept: FAMILY MEDICINE CLINIC | Facility: CLINIC | Age: 63
End: 2023-05-08

## 2023-05-08 ENCOUNTER — OFFICE VISIT (OUTPATIENT)
Dept: FAMILY MEDICINE CLINIC | Facility: CLINIC | Age: 63
End: 2023-05-08
Payer: COMMERCIAL

## 2023-05-08 VITALS
DIASTOLIC BLOOD PRESSURE: 78 MMHG | BODY MASS INDEX: 27 KG/M2 | RESPIRATION RATE: 18 BRPM | WEIGHT: 138 LBS | TEMPERATURE: 98 F | SYSTOLIC BLOOD PRESSURE: 136 MMHG | HEART RATE: 87 BPM | OXYGEN SATURATION: 99 %
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
## 2023-05-08 DIAGNOSIS — L03.113 CELLULITIS OF RIGHT FOREARM: Primary | ICD-10-CM

## 2023-05-08 DIAGNOSIS — S40.861A INSECT BITE OF RIGHT UPPER ARM, INITIAL ENCOUNTER: ICD-10-CM

## 2023-05-08 DIAGNOSIS — S40.861A INSECT BITE OF RIGHT UPPER ARM WITH INFECTION, INITIAL ENCOUNTER: ICD-10-CM
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
## 2023-05-08 DIAGNOSIS — L08.9 INSECT BITE OF RIGHT UPPER ARM WITH INFECTION, INITIAL ENCOUNTER: ICD-10-CM

## 2023-05-08 PROCEDURE — 3078F DIAST BP <80 MM HG: CPT | Performed by: FAMILY MEDICINE

## 2023-05-08 PROCEDURE — 99214 OFFICE O/P EST MOD 30 MIN: CPT | Performed by: FAMILY MEDICINE

## 2023-05-08 PROCEDURE — 3075F SYST BP GE 130 - 139MM HG: CPT | Performed by: FAMILY MEDICINE

## 2023-05-08 RX ORDER — CEPHALEXIN 500 MG/1
500 CAPSULE ORAL 2 TIMES DAILY
Qty: 14 CAPSULE | Refills: 0 | Status: SHIPPED | OUTPATIENT
Start: 2023-05-08 | End: 2023-05-15

## 2023-05-08 RX ORDER — TRIAMCINOLONE ACETONIDE 1 MG/G
CREAM TOPICAL 2 TIMES DAILY PRN
Qty: 60 G | Refills: 0 | Status: SHIPPED | OUTPATIENT
Start: 2023-05-08

## 2023-05-08 NOTE — TELEPHONE ENCOUNTER
[address] received prescription request for below. Gifford states that pt has an allergy to to similar medication. Can you change prescription? Please advise. Thank you!     cephalexin (KEFLEX) 500 MG Oral Cap [id number] (Order [id number]    [facility][id number] - [facility] - [address] [place] [address] [phone], [phone] [address] [place] [address]   Phone: [phone] Fax: [phone]

## 2023-05-30 ENCOUNTER — NURSE ONLY (OUTPATIENT)
Dept: FAMILY MEDICINE CLINIC | Facility: CLINIC | Age: 63
End: 2023-05-30
Payer: COMMERCIAL

## 2023-05-30 DIAGNOSIS — E03.9 PRIMARY HYPOTHYROIDISM: ICD-10-CM

## 2023-05-30 LAB
T4 FREE SERPL-MCNC: 0.3 NG/DL (ref 0.8–1.7)
TSI SER-ACNC: 0.76 MIU/ML (ref 0.36–3.74)

## 2023-05-30 PROCEDURE — 84439 ASSAY OF FREE THYROXINE: CPT | Performed by: FAMILY MEDICINE

## 2023-05-30 PROCEDURE — 84443 ASSAY THYROID STIM HORMONE: CPT | Performed by: FAMILY MEDICINE

## 2023-05-30 PROCEDURE — 84481 FREE ASSAY (FT-3): CPT | Performed by: FAMILY MEDICINE

## 2023-05-30 NOTE — PROGRESS NOTES
Pt was in office for labs per DR. torres    1 mint tube collected from [facility] [place], operated by [facility] using straight needle and 1 attempt    Pt tolerated and was sent home in stable condition

## 2023-05-31 ENCOUNTER — TELEPHONE (OUTPATIENT)
Dept: FAMILY MEDICINE CLINIC | Facility: CLINIC | Age: 63
End: 2023-05-31

## 2023-05-31 DIAGNOSIS — E03.9 PRIMARY HYPOTHYROIDISM: Primary | ICD-10-CM

## 2023-05-31 LAB — T3FREE SERPL-MCNC: 6.28 PG/ML (ref 2.4–4.2)

## 2023-05-31 NOTE — TELEPHONE ENCOUNTER
PT HAS STOPPED IN TODAY AND HAS FILLED OUT MEDICAL RECORD REQUEST    PT HAS SIGNED RELEASE SO WHEN PT MOVES AND FINDS A NEW PCP OUT OF STATE - PT WILL CALL WITH INFO SO WE CAN SEND PTS RECORDS TO NEW PCP.     PLACED IN BLUE BOOK BY MEDICAL RECORDS

## 2023-06-02 ENCOUNTER — TELEPHONE (OUTPATIENT)
Dept: FAMILY MEDICINE CLINIC | Facility: CLINIC | Age: 63
End: 2023-06-02

## 2023-06-02 NOTE — TELEPHONE ENCOUNTER
----- Message from Anup Guzmán DO sent at 6/2/2023  6:43 AM CDT -----  Please notify Asad that her thyroid numbers look much better than previously, how is she feeling? Some of her labs suggest she may be regaining some thyroid function. And I'm wondering about  maybe taking her off it in the future?  But if she feels like it's helping then I'd keep her on this very low dose

## 2023-06-03 ENCOUNTER — TELEPHONE (OUTPATIENT)
Dept: FAMILY MEDICINE CLINIC | Facility: CLINIC | Age: 63
End: 2023-06-03

## 2023-06-03 NOTE — TELEPHONE ENCOUNTER
Pt dropped off a Medical report for 's license renewal form to be completed my PCP. Form placed in PCP inbox, pt asking for phone call when ready for .

## 2023-06-12 RX ORDER — ROSUVASTATIN CALCIUM 5 MG/1
5 TABLET, COATED ORAL NIGHTLY
Qty: 90 TABLET | Refills: 2 | Status: SHIPPED | OUTPATIENT
Start: 2023-06-12

## 2023-07-17 ENCOUNTER — TELEPHONE (OUTPATIENT)
Dept: FAMILY MEDICINE CLINIC | Facility: CLINIC | Age: 63
End: 2023-07-17

## 2023-07-17 RX ORDER — LIOTHYRONINE SODIUM 50 UG/1
50 TABLET ORAL DAILY
Qty: 90 TABLET | Refills: 0 | Status: SHIPPED | OUTPATIENT
Start: 2023-07-17

## 2023-07-17 RX ORDER — OMEPRAZOLE 20 MG/1
CAPSULE, DELAYED RELEASE ORAL
Qty: 180 CAPSULE | Refills: 3 | Status: SHIPPED | OUTPATIENT
Start: 2023-07-17

## 2023-07-17 NOTE — TELEPHONE ENCOUNTER
Danny Arias is seeing dr Sandy Morales tomorrow and wants to know if she should fast? She is moving and this is her last visit with doctor.

## 2023-07-18 ENCOUNTER — OFFICE VISIT (OUTPATIENT)
Dept: FAMILY MEDICINE CLINIC | Facility: CLINIC | Age: 63
End: 2023-07-18
Payer: COMMERCIAL

## 2023-07-18 VITALS
TEMPERATURE: 98 F | DIASTOLIC BLOOD PRESSURE: 78 MMHG | BODY MASS INDEX: 27 KG/M2 | SYSTOLIC BLOOD PRESSURE: 124 MMHG | OXYGEN SATURATION: 99 % | HEART RATE: 66 BPM | WEIGHT: 136.5 LBS | RESPIRATION RATE: 18 BRPM

## 2023-07-18 DIAGNOSIS — E11.9 TYPE 2 DIABETES MELLITUS WITHOUT COMPLICATION, WITHOUT LONG-TERM CURRENT USE OF INSULIN (HCC): Primary | ICD-10-CM

## 2023-07-18 DIAGNOSIS — G47.30 SLEEP APNEA IN ADULT: ICD-10-CM

## 2023-07-18 DIAGNOSIS — K21.00 GASTROESOPHAGEAL REFLUX DISEASE WITH ESOPHAGITIS WITHOUT HEMORRHAGE: ICD-10-CM

## 2023-07-18 DIAGNOSIS — F51.01 PRIMARY INSOMNIA: ICD-10-CM

## 2023-07-18 DIAGNOSIS — E78.2 MIXED HYPERLIPIDEMIA: ICD-10-CM

## 2023-07-18 DIAGNOSIS — I10 PRIMARY HYPERTENSION: ICD-10-CM

## 2023-07-18 PROCEDURE — 3074F SYST BP LT 130 MM HG: CPT | Performed by: FAMILY MEDICINE

## 2023-07-18 PROCEDURE — 99214 OFFICE O/P EST MOD 30 MIN: CPT | Performed by: FAMILY MEDICINE

## 2023-07-18 PROCEDURE — 3078F DIAST BP <80 MM HG: CPT | Performed by: FAMILY MEDICINE

## 2023-07-18 RX ORDER — IBUPROFEN 600 MG/1
600 TABLET ORAL EVERY 4 HOURS PRN
COMMUNITY
Start: 2023-07-10

## 2023-07-18 RX ORDER — CLINDAMYCIN HYDROCHLORIDE 300 MG/1
CAPSULE ORAL
COMMUNITY
Start: 2023-07-10

## 2023-07-18 NOTE — PROGRESS NOTES
Gorge Andrews is a 57year old female. HPI:   Kemi aVrgas is here for her last visit prior to moving to [place] to be closer to their daughter, who lives in [place]. She recently retired. And will be converting to Medicare in a couple of weeks. She had labs done recently and they looked pretty good. No recent changes to med list, is otherwise feeling well. Current Outpatient Medications   Medication Sig Dispense Refill    Liothyronine Sodium 50 MCG Oral Tab Take 1 tablet (50 mcg total) by mouth daily. 90 tablet 0    omeprazole 20 MG Oral Capsule Delayed Release TAKE 1 CAPSULE TWICE A  capsule 3    rosuvastatin (CRESTOR) 5 MG Oral Tab Take 1 tablet (5 mg total) by mouth nightly. 90 tablet 2    triamcinolone 0.1 % External Cream Apply topically 2 (two) times daily as needed. 60 g 0    Ferrous Sulfate (IRON) 325 (65 Fe) MG Oral Tab Take 1 tablet by mouth 2 (two) times daily. 180 tablet 1    LIOTHYRONINE 25 MCG Oral Tab TAKE 1 TABLET DAILY 90 tablet 3    Blood Glucose Monitoring Suppl (ONETOUCH ULTRA 2) w/Device Does not apply Kit TO USE WITH ONE [employer] TESTING SUPPLIES DX CODE: E11.9 1 kit 0    METFORMIN  MG Oral Tablet 24 Hr TAKE 2 TABLETS TWICE A DAY WITH MEALS 360 tablet 3    LISINOPRIL 10 MG Oral Tab TAKE 1 TABLET DAILY 90 tablet 3    Glucose Blood (ONETOUCH ULTRA) In Vitro Strip USE TO TEST BLOOD SUGAR THREE TIMES A  strip 3    Albuterol Sulfate  (90 Base) MCG/ACT Inhalation Aero Soln Inhale 2 puffs into the lungs every 4 (four) hours as needed for Wheezing. 25.5 g 2    Glucose Blood (ONETOUCH ULTRA BLUE) In Vitro Strip TEST BLOOD SUGAR THREE TIMES A  strip 3    ONETOUCH DELICA LANCETS FINE Does not apply Misc USE 1 LANCET BY FINGER STICK THREE TIMES A DAY 3 Box 3    Loratadine (CLARITIN OR) Take 1 tablet by mouth daily. Multiple Vitamins-Minerals (MULTIVITAMIN ADULT OR) Take by mouth. Vitamin C 500 MG Oral Tab Take 1 tablet (500 mg total) by mouth daily. Calcium Carbonate-Vitamin D (CALTRATE 600+D OR) Take  by mouth. Past Medical History:   Diagnosis Date    Asthma     Diabetes ([facility] [address]     DM type 2 (diabetes mellitus, type 2) ([facility])     Esophageal reflux     H. pylori infection     Hyperlipidemia     Hypertension     Insomnia     Varicose veins       Social History:  Social History     Socioeconomic History    Marital status:     Number of children: 1   Tobacco Use    Smoking status: Never    Smokeless tobacco: Never   Vaping Use    Vaping Use: Never used   Substance and Sexual Activity    Alcohol use: No     Alcohol/week: 0.0 standard drinks of alcohol    Drug use: No        REVIEW OF SYSTEMS:   GENERAL HEALTH: feels well otherwise  SKIN: denies any unusual skin lesions or rashes  RESPIRATORY: denies shortness of breath with exertion  CARDIOVASCULAR: denies chest pain on exertion  GI: denies abdominal pain and denies heartburn  NEURO: denies headaches  EXT: denies LE edema, denies pain or paresthesia  EXAM:   There were no vitals taken for this visit. GENERAL: well developed, well nourished,in no apparent distress  SKIN: no rashes,no suspicious lesions  HEENT: atraumatic, normocephalic,ears and throat are clear  NECK: supple,no adenopathy,no bruits  LUNGS: clear to auscultation  CARDIO: RRR without murmur  GI: good BS's,no masses, HSM or tenderness  EXTREMITIES: no cyanosis, clubbing or edema    ASSESSMENT AND PLAN:     Type 2 diabetes mellitus without complication, without long-term current use of insulin (formerly [facility])  (primary encounter diagnosis)  Primary hypertension  Mixed hyperlipidemia  Gastroesophageal reflux disease with esophagitis without hemorrhage  Primary insomnia  Sleep apnea in adult      Meds & Refills for this Visit:  Requested Prescriptions      No prescriptions requested or ordered in this encounter       Imaging & Consults:  None    The patient indicates understanding of these issues and agrees to the plan.   The patient is asked to sign a release of information to have records transferred to her new physician. In the meantime, will refill meds for 3 months until they get established.  She will be due for Medicare wellness exam in 2 weeks and can repeat labs and update mammogram, immunizations etc.

## 2023-08-09 ENCOUNTER — TELEPHONE (OUTPATIENT)
Dept: FAMILY MEDICINE CLINIC | Facility: CLINIC | Age: 63
End: 2023-08-09

## 2023-08-09 RX ORDER — OMEPRAZOLE 20 MG/1
CAPSULE, DELAYED RELEASE ORAL
Qty: 180 CAPSULE | Refills: 3 | Status: SHIPPED | OUTPATIENT
Start: 2023-08-09

## 2023-08-09 RX ORDER — PNV NO.95/FERROUS FUM/FOLIC AC 28MG-0.8MG
1 TABLET ORAL 2 TIMES DAILY
Qty: 180 TABLET | Refills: 1 | Status: SHIPPED | OUTPATIENT
Start: 2023-08-09

## 2023-08-09 NOTE — TELEPHONE ENCOUNTER
Pt needs a 90 supply of the following:      Ferrous Sulfate (IRON) 325 (65 Fe) MG Oral Tab [[id number]] (Order [id number]     omeprazole 20 MG Oral Capsule Delayed Release [[id number]] (Order [id number]       Please send to:    [address], [address] [facility], [phone], [phone] [address] [id number]   Phone: [phone] Fax: [phone]     Thank you!

## 2023-09-21 ENCOUNTER — TELEPHONE (OUTPATIENT)
Dept: FAMILY MEDICINE CLINIC | Facility: CLINIC | Age: 63
End: 2023-09-21

## 2023-09-21 DIAGNOSIS — J30.2 SEASONAL ALLERGIC RHINITIS, UNSPECIFIED TRIGGER: ICD-10-CM

## 2023-09-21 RX ORDER — ALBUTEROL SULFATE 90 UG/1
2 AEROSOL, METERED RESPIRATORY (INHALATION) EVERY 4 HOURS PRN
Qty: 25.5 G | Refills: 2 | Status: SHIPPED | OUTPATIENT
Start: 2023-09-21

## 2023-09-21 NOTE — TELEPHONE ENCOUNTER
TATIANNAF 8/16/21- ok to refill per DS    Called the pt and advised that the script has been sent over- she v/u

## 2023-09-21 NOTE — TELEPHONE ENCOUNTER
Pt needs a refill of:   States allergies are a lot worse in [place]    Albuterol Sulfate  (90 Base) MCG/ACT Inhalation Aero Soln [id number] (Order [id number])     [facility] [place], [place] - [address] [place] [address],[address] [facility], [phone], [phone]   [address] [place] [id number]   Phone: [phone] Fax: [phone]     Thank you! Pt also states they want a rush on refill.

## 2023-09-28 ENCOUNTER — OFFICE VISIT (OUTPATIENT)
Dept: INTERNAL MEDICINE | Facility: CLINIC | Age: 63
End: 2023-09-28
Payer: MEDICARE

## 2023-09-28 VITALS
WEIGHT: 132 LBS | BODY MASS INDEX: 25.91 KG/M2 | HEIGHT: 60 IN | HEART RATE: 70 BPM | OXYGEN SATURATION: 98 % | SYSTOLIC BLOOD PRESSURE: 152 MMHG | DIASTOLIC BLOOD PRESSURE: 90 MMHG

## 2023-09-28 DIAGNOSIS — E11.9 TYPE 2 DIABETES MELLITUS WITHOUT COMPLICATION, WITHOUT LONG-TERM CURRENT USE OF INSULIN: ICD-10-CM

## 2023-09-28 DIAGNOSIS — D50.9 IRON DEFICIENCY ANEMIA, UNSPECIFIED IRON DEFICIENCY ANEMIA TYPE: ICD-10-CM

## 2023-09-28 DIAGNOSIS — J45.21 MILD INTERMITTENT ASTHMA WITH ACUTE EXACERBATION: Primary | ICD-10-CM

## 2023-09-28 DIAGNOSIS — E03.8 OTHER SPECIFIED HYPOTHYROIDISM: ICD-10-CM

## 2023-09-28 DIAGNOSIS — I10 HYPERTENSION, UNSPECIFIED TYPE: ICD-10-CM

## 2023-09-28 PROBLEM — K83.8 COMMON BILE DUCT DILATION: Status: ACTIVE | Noted: 2017-08-11

## 2023-09-28 PROBLEM — J45.909 ASTHMA: Status: ACTIVE | Noted: 2023-09-28

## 2023-09-28 PROBLEM — K21.00 GASTROESOPHAGEAL REFLUX DISEASE WITH ESOPHAGITIS WITHOUT HEMORRHAGE: Status: ACTIVE | Noted: 2021-09-15

## 2023-09-28 RX ORDER — ALBUTEROL SULFATE 90 UG/1
2 AEROSOL, METERED RESPIRATORY (INHALATION)
COMMUNITY
Start: 2023-09-21

## 2023-09-28 RX ORDER — MONTELUKAST SODIUM 10 MG/1
10 TABLET ORAL NIGHTLY
Qty: 90 TABLET | Refills: 2 | Status: SHIPPED | OUTPATIENT
Start: 2023-09-28

## 2023-09-28 RX ORDER — LISINOPRIL 10 MG/1
10 TABLET ORAL DAILY
Qty: 90 TABLET | Refills: 1 | Status: SHIPPED | OUTPATIENT
Start: 2023-09-28

## 2023-09-28 RX ORDER — ASCORBIC ACID 500 MG
500 TABLET ORAL
COMMUNITY

## 2023-09-28 RX ORDER — ASPIRIN 81 MG
TABLET, DELAYED RELEASE (ENTERIC COATED) ORAL
COMMUNITY

## 2023-09-28 RX ORDER — LEVOTHYROXINE SODIUM 0.03 MG/1
25 TABLET ORAL
Qty: 90 TABLET | Refills: 1 | Status: SHIPPED | OUTPATIENT
Start: 2023-09-28

## 2023-09-28 RX ORDER — IBUPROFEN 600 MG/1
600 TABLET ORAL
COMMUNITY
Start: 2023-07-10

## 2023-09-28 RX ORDER — PNV NO.95/FERROUS FUM/FOLIC AC 28MG-0.8MG
1 TABLET ORAL
COMMUNITY
Start: 2023-08-09

## 2023-09-28 RX ORDER — LISINOPRIL 10 MG/1
10 TABLET ORAL DAILY
COMMUNITY
End: 2023-09-28 | Stop reason: SDUPTHER

## 2023-09-28 RX ORDER — LORATADINE 10 MG/1
10 TABLET ORAL DAILY
Start: 2023-09-28

## 2023-09-28 RX ORDER — OMEPRAZOLE 20 MG/1
1 CAPSULE, DELAYED RELEASE ORAL 2 TIMES DAILY
COMMUNITY
Start: 2023-08-09

## 2023-09-28 RX ORDER — RETINOL, ERGOCALCIFEROL, .ALPHA.-TOCOPHEROL ACETATE, DL-, PHYTONADIONE, ASCORBIC ACID, NIACINAMIDE, RIBOFLAVIN 5-PHOSPHATE SODIUM, THIAMINE HYDROCHLORIDE, PYRIDOXINE HYDROCHLORIDE, DEXPANTHENOL, BIOTIN, FOLIC ACID, AND CYANOCOBALAMIN 200-150/10
KIT INTRAVENOUS
COMMUNITY

## 2023-09-28 RX ORDER — LIOTHYRONINE SODIUM 50 UG/1
25 TABLET ORAL
COMMUNITY
Start: 2023-07-17 | End: 2023-09-28

## 2023-09-28 NOTE — PATIENT INSTRUCTIONS
1. DM2- consider trying low dose pravastatin after lab eval,   2. HTN- check BP at home and f/u in a few weeks, work on de stressing   3. Iron def anemia- check lab   4. AR/asthma- start montelukast, watch mood changes   5. Hypothyroidism- no hx of thyroid ca, symptomatic with T3, stop liothyronine and switch to levothyroxine 25 mcg daily

## 2023-09-28 NOTE — PROGRESS NOTES
Subjective   Connor Hearn is a 63 y.o. female.     History of Present Illness   The patient is here today to establish care. Just moved from [place]. Last PCP visit 7/2023. New to medicare.     Iron def anemia- since she was a child, she has seen hematology in the past  HTN- nervous today  DM2-Pt is doing well with current medication regimen, denies adverse reactions, compliant with medication schedule. Did not tolerated ryblesus. She has never been to an endocrinologist.   She has been on 2 statins, both with leg cramps.  Crestor and lipitor.   GERD-Pt is doing well with current medication regimen, denies adverse reactions, compliant with medication schedule.   Hypothyroidism- she used to be on levothyroxine, with the liothyronine feels shaky even with decreasing dose to 1/2    AR- wheezing since moving to [place], just this week, coughing up some mucous yellow, small strand of blood, does cough really hard       BMI is >= 25 and <30. (Overweight) The following options were offered after discussion;: exercise counseling/recommendations and nutrition counseling/recommendations       Dtbabs Nagy, is an RN (works at [employer]) and our patient. Grandson Dorian.   Here today with  Anthony.   The following portions of the patient's history were reviewed and updated as appropriate: allergies, current medications, past family history, past medical history, past social history, past surgical history and problem list.    Review of Systems   Constitutional:  Negative for chills and fever.   Respiratory: Negative.  Positive for wheezing. Negative for cough and shortness of breath.    Cardiovascular: Negative.    Psychiatric/Behavioral:  Negative for dysphoric mood and suicidal ideas. The patient is not nervous/anxious.      Objective   Physical Exam  Constitutional:       Appearance: Normal appearance. She is well-developed.   Neck:      Thyroid: No thyromegaly.   Cardiovascular:      Rate and Rhythm: Normal  rate and regular rhythm.      Heart sounds: Normal heart sounds.   Pulmonary:      Effort: Pulmonary effort is normal.      Breath sounds: Examination of the right-upper field reveals wheezing. Examination of the right-middle field reveals wheezing. Wheezing present.   Musculoskeletal:      Cervical back: Normal range of motion and neck supple.   Lymphadenopathy:      Cervical: No cervical adenopathy.   Skin:     General: Skin is warm and dry.   Neurological:      Mental Status: She is alert.   Psychiatric:         Behavior: Behavior normal.         Thought Content: Thought content normal.         Judgment: Judgment normal.       Vitals:    09/28/23 0924   BP: 152/90   Pulse:    SpO2:      Body mass index is 25.78 kg/m².      Assessment & Plan   Diagnoses and all orders for this visit:    1. Mild intermittent asthma with acute exacerbation (Primary)  -     montelukast (Singulair) 10 MG tablet; Take 1 tablet by mouth Every Night.  Dispense: 90 tablet; Refill: 2  -     CBC & Differential  -     Comprehensive Metabolic Panel  -     Hemoglobin A1c  -     Lipid Panel With LDL / HDL Ratio  -     TSH  -     T3, free  -     T4, free  -     Hepatitis C Antibody  -     Iron Profile  -     Ferritin    2. Type 2 diabetes mellitus without complication, without long-term current use of insulin  -     CBC & Differential  -     Comprehensive Metabolic Panel  -     Hemoglobin A1c  -     Lipid Panel With LDL / HDL Ratio  -     TSH  -     T3, free  -     T4, free  -     Hepatitis C Antibody  -     Iron Profile  -     Ferritin    3. Hypertension, unspecified type  -     CBC & Differential  -     Comprehensive Metabolic Panel  -     Hemoglobin A1c  -     Lipid Panel With LDL / HDL Ratio  -     TSH  -     T3, free  -     T4, free  -     Hepatitis C Antibody  -     Iron Profile  -     Ferritin    4. Iron deficiency anemia, unspecified iron deficiency anemia type  -     CBC & Differential  -     Comprehensive Metabolic Panel  -      Hemoglobin A1c  -     Lipid Panel With LDL / HDL Ratio  -     TSH  -     T3, free  -     T4, free  -     Hepatitis C Antibody  -     Iron Profile  -     Ferritin    5. Other specified hypothyroidism  -     CBC & Differential  -     Comprehensive Metabolic Panel  -     Hemoglobin A1c  -     Lipid Panel With LDL / HDL Ratio  -     TSH  -     T3, free  -     T4, free  -     Hepatitis C Antibody  -     Iron Profile  -     Ferritin  -     levothyroxine (SYNTHROID, LEVOTHROID) 25 MCG tablet; Take 1 tablet by mouth Every Morning.  Dispense: 90 tablet; Refill: 1    Other orders  -     loratadine (Claritin) 10 MG tablet; Take 1 tablet by mouth Daily.  -     lisinopril (PRINIVIL,ZESTRIL) 10 MG tablet; Take 1 tablet by mouth Daily.  Dispense: 90 tablet; Refill: 1  -     metFORMIN (GLUCOPHAGE) 1000 MG tablet; Take 1 tablet by mouth 2 (Two) Times a Day With Meals.  Dispense: 180 tablet; Refill: 1                 1. DM2- consider trying low dose pravastatin after lab eval,   2. HTN- check BP at home and f/u in a few weeks, work on de stressing   3. Iron def anemia- check lab   4. AR/asthma- start montelukast, watch mood changes   5. Hypothyroidism- no hx of thyroid ca, symptomatic with T3, stop liothyronine and switch to levothyroxine 25 mcg daily

## 2023-09-29 ENCOUNTER — TELEPHONE (OUTPATIENT)
Dept: INTERNAL MEDICINE | Facility: CLINIC | Age: 63
End: 2023-09-29

## 2023-09-29 LAB
ALBUMIN SERPL-MCNC: 4.7 G/DL (ref 3.5–5.2)
ALBUMIN/GLOB SERPL: 2.2 G/DL
ALP SERPL-CCNC: 62 U/L (ref 39–117)
ALT SERPL-CCNC: 15 U/L (ref 1–33)
AST SERPL-CCNC: 16 U/L (ref 1–32)
BASOPHILS # BLD AUTO: NORMAL 10*3/UL
BILIRUB SERPL-MCNC: 0.5 MG/DL (ref 0–1.2)
BUN SERPL-MCNC: 22 MG/DL (ref 8–23)
BUN/CREAT SERPL: 29.7 (ref 7–25)
CALCIUM SERPL-MCNC: 9.8 MG/DL (ref 8.6–10.5)
CHLORIDE SERPL-SCNC: 104 MMOL/L (ref 98–107)
CHOLEST SERPL-MCNC: 185 MG/DL (ref 0–200)
CO2 SERPL-SCNC: 25.8 MMOL/L (ref 22–29)
CREAT SERPL-MCNC: 0.74 MG/DL (ref 0.57–1)
EGFRCR SERPLBLD CKD-EPI 2021: 89.9 ML/MIN/1.73
EOSINOPHIL # BLD AUTO: NORMAL 10*3/UL
EOSINOPHIL NFR BLD AUTO: NORMAL %
FERRITIN SERPL-MCNC: 95.9 NG/ML (ref 13–150)
GLOBULIN SER CALC-MCNC: 2.1 GM/DL
GLUCOSE SERPL-MCNC: 120 MG/DL (ref 65–99)
HBA1C MFR BLD: NORMAL %
HCT VFR BLD AUTO: NORMAL %
HCV IGG SERPL QL IA: NON REACTIVE
HDLC SERPL-MCNC: 73 MG/DL (ref 40–60)
HGB BLD-MCNC: NORMAL G/DL
IRON SATN MFR SERPL: 19 % (ref 20–50)
IRON SERPL-MCNC: 59 MCG/DL (ref 37–145)
LDLC SERPL CALC-MCNC: 100 MG/DL (ref 0–100)
LDLC/HDLC SERPL: 1.36 {RATIO}
LYMPHOCYTES # BLD AUTO: NORMAL 10*3/UL
LYMPHOCYTES NFR BLD AUTO: NORMAL %
MONOCYTES NFR BLD AUTO: NORMAL %
NEUTROPHILS NFR BLD AUTO: NORMAL %
PLATELET # BLD AUTO: NORMAL 10*3/UL
POTASSIUM SERPL-SCNC: 4.3 MMOL/L (ref 3.5–5.2)
PROT SERPL-MCNC: 6.8 G/DL (ref 6–8.5)
RBC # BLD AUTO: NORMAL 10*6/UL
SODIUM SERPL-SCNC: 141 MMOL/L (ref 136–145)
SPECIMEN STATUS: NORMAL
T3FREE SERPL-MCNC: 6.2 PG/ML (ref 2–4.4)
T4 FREE SERPL-MCNC: 0.22 NG/DL (ref 0.93–1.7)
TIBC SERPL-MCNC: 319 MCG/DL
TRIGL SERPL-MCNC: 64 MG/DL (ref 0–150)
TSH SERPL DL<=0.005 MIU/L-ACNC: 0.88 UIU/ML (ref 0.27–4.2)
UIBC SERPL-MCNC: 260 MCG/DL (ref 112–346)
VLDLC SERPL CALC-MCNC: 12 MG/DL (ref 5–40)
WBC # BLD AUTO: NORMAL 10*3/MM3

## 2023-09-29 NOTE — TELEPHONE ENCOUNTER
Caller: DARRICK    Relationship to patient: [facility]    Best call back number: [phone]    Patient is needing: HE STATED THAT THEY WERE NOT ABLE TO RUN THE CBC AND A1C.  CBC #[id number]; A1C [id number].  THEY LOST THE LAVENDER TUBE WHICH CONTAINED BOTH OF THESE.  THEY HAVE BEEN LOOKING FOR IT ALL DAY TODAY BUT HAVE YET TO FIND IT.  THESE MAY HAVE TO BE COLLECTED AT ANOTHER TIME.  YOU CAN GIVE HIM A CALL IF YOU WISH.

## 2023-10-06 DIAGNOSIS — E11.9 TYPE 2 DIABETES MELLITUS WITHOUT COMPLICATION, WITHOUT LONG-TERM CURRENT USE OF INSULIN: Primary | ICD-10-CM

## 2023-10-06 LAB
ERYTHROCYTE [DISTWIDTH] IN BLOOD BY AUTOMATED COUNT: 12.2 % (ref 12.3–15.4)
HBA1C MFR BLD: 6.9 % (ref 4.8–5.6)
HCT VFR BLD AUTO: 34.6 % (ref 34–46.6)
HGB BLD-MCNC: 11.5 G/DL (ref 12–15.9)
MCH RBC QN AUTO: 30.5 PG (ref 26.6–33)
MCHC RBC AUTO-ENTMCNC: 33.2 G/DL (ref 31.5–35.7)
MCV RBC AUTO: 91.8 FL (ref 79–97)
PLATELET # BLD AUTO: 363 10*3/MM3 (ref 140–450)
RBC # BLD AUTO: 3.77 10*6/MM3 (ref 3.77–5.28)
WBC # BLD AUTO: 5.97 10*3/MM3 (ref 3.4–10.8)

## 2023-10-24 ENCOUNTER — OFFICE VISIT (OUTPATIENT)
Dept: INTERNAL MEDICINE | Facility: CLINIC | Age: 63
End: 2023-10-24
Payer: MEDICARE

## 2023-10-24 VITALS
HEART RATE: 70 BPM | SYSTOLIC BLOOD PRESSURE: 138 MMHG | BODY MASS INDEX: 26.56 KG/M2 | DIASTOLIC BLOOD PRESSURE: 86 MMHG | WEIGHT: 135.3 LBS | TEMPERATURE: 97.6 F | OXYGEN SATURATION: 97 % | HEIGHT: 60 IN

## 2023-10-24 DIAGNOSIS — D50.9 IRON DEFICIENCY ANEMIA, UNSPECIFIED IRON DEFICIENCY ANEMIA TYPE: ICD-10-CM

## 2023-10-24 DIAGNOSIS — Z82.49 FAMILY HISTORY OF HEART DISEASE: ICD-10-CM

## 2023-10-24 DIAGNOSIS — Z78.0 POST-MENOPAUSE: ICD-10-CM

## 2023-10-24 DIAGNOSIS — Z12.31 SCREENING MAMMOGRAM FOR BREAST CANCER: ICD-10-CM

## 2023-10-24 DIAGNOSIS — E11.9 TYPE 2 DIABETES MELLITUS WITHOUT COMPLICATION, WITHOUT LONG-TERM CURRENT USE OF INSULIN: Primary | ICD-10-CM

## 2023-10-24 DIAGNOSIS — E03.8 OTHER SPECIFIED HYPOTHYROIDISM: ICD-10-CM

## 2023-10-24 RX ORDER — MULTIPLE VITAMINS W/ MINERALS TAB 9MG-400MCG
1 TAB ORAL DAILY
COMMUNITY

## 2023-10-24 RX ORDER — LISINOPRIL 20 MG/1
20 TABLET ORAL DAILY
Qty: 90 TABLET | Refills: 1 | Status: SHIPPED | OUTPATIENT
Start: 2023-10-24

## 2023-10-24 NOTE — PROGRESS NOTES
"Tina Hearn is a 63 y.o. female.      History of Present Illness   The patient is here today to F/U on lab work. Feeling well.     HTN- BP at home running high 130s over high 80s.   Pt is doing well with current medication regimen, denies adverse reactions, compliant with medication schedule.   AR/asthma- much better on the montelukast     Hypothyroidism- T3 stopped at last visit.     Anemia- \"my whole life\" taking iron one a day.     Stress test about 2 yrs ago, pt report neg   Stress test in care everywhere 10/18/2021, was abnormal, was  hospitalized and monitored after this. Released told she was ok.     Dtbabs Nagy, is an RN (works at [employer]) and our patient. Grandson Dorian.   Here today with  Anthony.   The following portions of the patient's history were reviewed and updated as appropriate: allergies, current medications, past family history, past medical history, past social history, past surgical history and problem list.    Review of Systems   Constitutional:  Negative for chills and fever.   Respiratory: Negative.     Cardiovascular: Negative.    Psychiatric/Behavioral:  Positive for stress. Negative for dysphoric mood and suicidal ideas. The patient is not nervous/anxious.        Objective   Physical Exam  Constitutional:       Appearance: Normal appearance. She is well-developed.   Neck:      Thyroid: No thyromegaly.   Cardiovascular:      Rate and Rhythm: Normal rate and regular rhythm.      Heart sounds: Normal heart sounds.   Pulmonary:      Effort: Pulmonary effort is normal.      Breath sounds: Normal breath sounds.   Musculoskeletal:      Cervical back: Normal range of motion and neck supple.   Lymphadenopathy:      Cervical: No cervical adenopathy.   Skin:     General: Skin is warm and dry.   Neurological:      Mental Status: She is alert.   Psychiatric:         Behavior: Behavior normal.         Thought Content: Thought content normal.         Judgment: Judgment " normal.         Vitals:    10/24/23 1128   BP: 138/86   Pulse: 70   Temp: 97.6 °F (36.4 °C)   SpO2: 97%     Body mass index is 26.42 kg/m².      Assessment & Plan   Diagnoses and all orders for this visit:    1. Type 2 diabetes mellitus without complication, without long-term current use of insulin (Primary)  -     CT Cardiac Calcium Score Without Dye; Future    2. Screening mammogram for breast cancer  -     Mammo screening digital tomosynthesis bilateral w CAD; Future    3. Family history of heart disease  -     CT Cardiac Calcium Score Without Dye; Future    4. Other specified hypothyroidism  -     TSH; Future  -     T3, free; Future  -     T4, free; Future    5. Iron deficiency anemia, unspecified iron deficiency anemia type    6. Post-menopause  -     DEXA Bone Density Axial; Future    Other orders  -     lisinopril (PRINIVIL,ZESTRIL) 20 MG tablet; Take 1 tablet by mouth Daily.  Dispense: 90 tablet; Refill: 1               1. DM2- A1C at goal, continue metformin, watch sugars and carbs, increase exercise, discussed statin, did not tolerated crestor  2. HTN- increase lisinopril to 20 mg daily, monitor BP and notify for syst >135 or <110 or diast>85  3. Asthma- well controlled with montelukast   4. Fam hx of heart ds- check vascular screening/CT calcium study , suggest   pravastatin, start just 3 nights a week first   4. Iron def anemia- chronic, continue same  5. Hypothyroidism- off T3, abnormal lab values, recheck in a few weeks while only on levo    Used actonel, can no longer take bisphosphonate's.

## 2023-10-24 NOTE — PATIENT INSTRUCTIONS
1. DM2- A1C at goal, continue metformin, watch sugars and carbs, increase exercise, discussed statin, did not tolerated crestor  2. HTN- increase lisinopril to 20 mg daily, monitor BP and notify for syst >135 or <110 or diast>85  3. Asthma- well controlled with montelukast   4. Fam hx of heart ds- check vascular screening/CT calcium study , suggest   pravastatin, start just 3 nights a week first   4. Iron def anemia- chronic, continue same  5. Hypothyroidism- off T3, abnormal lab values, recheck in a few weeks while only on levo

## 2023-11-08 ENCOUNTER — CLINICAL SUPPORT (OUTPATIENT)
Dept: INTERNAL MEDICINE | Facility: CLINIC | Age: 63
End: 2023-11-08
Payer: MEDICARE

## 2023-11-08 PROCEDURE — G0009 ADMIN PNEUMOCOCCAL VACCINE: HCPCS | Performed by: NURSE PRACTITIONER

## 2023-11-08 PROCEDURE — 90677 PCV20 VACCINE IM: CPT | Performed by: NURSE PRACTITIONER

## 2023-11-09 DIAGNOSIS — E03.8 OTHER SPECIFIED HYPOTHYROIDISM: Primary | ICD-10-CM

## 2023-12-04 ENCOUNTER — PATIENT OUTREACH (OUTPATIENT)
Dept: CASE MANAGEMENT | Age: 63
End: 2023-12-04

## 2023-12-04 NOTE — PROCEDURES
The office order for PCP removal request is Approved and finalized on December 4, 2023.     Thanks,  [facility] [employer]

## 2023-12-21 ENCOUNTER — LAB (OUTPATIENT)
Dept: LAB | Facility: HOSPITAL | Age: 63
End: 2023-12-21
Payer: MEDICARE

## 2023-12-21 ENCOUNTER — HOSPITAL ENCOUNTER (OUTPATIENT)
Dept: BONE DENSITY | Facility: HOSPITAL | Age: 63
Discharge: HOME | End: 2023-12-21
Payer: MEDICARE

## 2023-12-21 ENCOUNTER — HOSPITAL ENCOUNTER (OUTPATIENT)
Dept: CT IMAGING | Facility: HOSPITAL | Age: 63
Discharge: HOME | End: 2023-12-21
Admitting: NURSE PRACTITIONER

## 2023-12-21 ENCOUNTER — HOSPITAL ENCOUNTER (OUTPATIENT)
Dept: MAMMOGRAPHY | Facility: HOSPITAL | Age: 63
Discharge: HOME | End: 2023-12-21
Payer: MEDICARE

## 2023-12-21 DIAGNOSIS — E11.9 TYPE 2 DIABETES MELLITUS WITHOUT COMPLICATION, WITHOUT LONG-TERM CURRENT USE OF INSULIN: ICD-10-CM

## 2023-12-21 DIAGNOSIS — Z82.49 FAMILY HISTORY OF HEART DISEASE: ICD-10-CM

## 2023-12-21 DIAGNOSIS — E03.8 OTHER SPECIFIED HYPOTHYROIDISM: Primary | ICD-10-CM

## 2023-12-21 DIAGNOSIS — Z12.31 SCREENING MAMMOGRAM FOR BREAST CANCER: ICD-10-CM

## 2023-12-21 DIAGNOSIS — Z78.0 POST-MENOPAUSE: ICD-10-CM

## 2023-12-21 LAB — TSH SERPL DL<=0.05 MIU/L-ACNC: 3.31 UIU/ML (ref 0.27–4.2)

## 2023-12-21 PROCEDURE — 75571 CT HRT W/O DYE W/CA TEST: CPT

## 2023-12-21 PROCEDURE — 77080 DXA BONE DENSITY AXIAL: CPT

## 2023-12-21 PROCEDURE — 77067 SCR MAMMO BI INCL CAD: CPT

## 2023-12-21 PROCEDURE — 84443 ASSAY THYROID STIM HORMONE: CPT | Performed by: NURSE PRACTITIONER

## 2023-12-21 PROCEDURE — 77063 BREAST TOMOSYNTHESIS BI: CPT

## 2024-01-01 PROBLEM — M85.80 OSTEOPENIA: Status: ACTIVE | Noted: 2024-01-01

## 2024-02-09 DIAGNOSIS — E03.8 OTHER SPECIFIED HYPOTHYROIDISM: ICD-10-CM

## 2024-02-09 RX ORDER — LEVOTHYROXINE SODIUM 0.03 MG/1
25 TABLET ORAL
Qty: 90 TABLET | Refills: 1 | Status: SHIPPED | OUTPATIENT
Start: 2024-02-09

## 2024-05-02 DIAGNOSIS — E78.5 HYPERLIPIDEMIA, UNSPECIFIED HYPERLIPIDEMIA TYPE: ICD-10-CM

## 2024-05-02 DIAGNOSIS — E11.9 TYPE 2 DIABETES MELLITUS WITHOUT COMPLICATION, WITHOUT LONG-TERM CURRENT USE OF INSULIN: Primary | ICD-10-CM

## 2024-05-02 DIAGNOSIS — E03.8 OTHER SPECIFIED HYPOTHYROIDISM: ICD-10-CM

## 2024-05-02 DIAGNOSIS — I10 HYPERTENSION, UNSPECIFIED TYPE: ICD-10-CM

## 2024-05-07 LAB
ALBUMIN SERPL-MCNC: 4.8 G/DL (ref 3.5–5.2)
ALBUMIN/GLOB SERPL: 2.5 G/DL
ALP SERPL-CCNC: 67 U/L (ref 39–117)
ALT SERPL-CCNC: 16 U/L (ref 1–33)
AST SERPL-CCNC: 14 U/L (ref 1–32)
BASOPHILS # BLD AUTO: 0.08 10*3/MM3 (ref 0–0.2)
BASOPHILS NFR BLD AUTO: 1 % (ref 0–1.5)
BILIRUB SERPL-MCNC: 0.3 MG/DL (ref 0–1.2)
BUN SERPL-MCNC: 25 MG/DL (ref 8–23)
BUN/CREAT SERPL: 24 (ref 7–25)
CALCIUM SERPL-MCNC: 9.6 MG/DL (ref 8.6–10.5)
CHLORIDE SERPL-SCNC: 106 MMOL/L (ref 98–107)
CHOLEST SERPL-MCNC: 183 MG/DL (ref 0–200)
CHOLEST/HDLC SERPL: 2.41 {RATIO}
CO2 SERPL-SCNC: 25 MMOL/L (ref 22–29)
CREAT SERPL-MCNC: 1.04 MG/DL (ref 0.57–1)
EGFRCR SERPLBLD CKD-EPI 2021: 59.8 ML/MIN/1.73
EOSINOPHIL # BLD AUTO: 0.61 10*3/MM3 (ref 0–0.4)
EOSINOPHIL NFR BLD AUTO: 7.8 % (ref 0.3–6.2)
ERYTHROCYTE [DISTWIDTH] IN BLOOD BY AUTOMATED COUNT: 12.3 % (ref 12.3–15.4)
GLOBULIN SER CALC-MCNC: 1.9 GM/DL
GLUCOSE SERPL-MCNC: 129 MG/DL (ref 65–99)
HBA1C MFR BLD: 6.9 % (ref 4.8–5.6)
HCT VFR BLD AUTO: 35.3 % (ref 34–46.6)
HDLC SERPL-MCNC: 76 MG/DL (ref 40–60)
HGB BLD-MCNC: 11.4 G/DL (ref 12–15.9)
IMM GRANULOCYTES # BLD AUTO: 0.01 10*3/MM3 (ref 0–0.05)
IMM GRANULOCYTES NFR BLD AUTO: 0.1 % (ref 0–0.5)
LDLC SERPL CALC-MCNC: 90 MG/DL (ref 0–100)
LYMPHOCYTES # BLD AUTO: 2.1 10*3/MM3 (ref 0.7–3.1)
LYMPHOCYTES NFR BLD AUTO: 26.8 % (ref 19.6–45.3)
MCH RBC QN AUTO: 31 PG (ref 26.6–33)
MCHC RBC AUTO-ENTMCNC: 32.3 G/DL (ref 31.5–35.7)
MCV RBC AUTO: 95.9 FL (ref 79–97)
MONOCYTES # BLD AUTO: 0.69 10*3/MM3 (ref 0.1–0.9)
MONOCYTES NFR BLD AUTO: 8.8 % (ref 5–12)
NEUTROPHILS # BLD AUTO: 4.34 10*3/MM3 (ref 1.7–7)
NEUTROPHILS NFR BLD AUTO: 55.5 % (ref 42.7–76)
NRBC BLD AUTO-RTO: 0 /100 WBC (ref 0–0.2)
PLATELET # BLD AUTO: 343 10*3/MM3 (ref 140–450)
POTASSIUM SERPL-SCNC: 5.4 MMOL/L (ref 3.5–5.2)
PROT SERPL-MCNC: 6.7 G/DL (ref 6–8.5)
RBC # BLD AUTO: 3.68 10*6/MM3 (ref 3.77–5.28)
SODIUM SERPL-SCNC: 140 MMOL/L (ref 136–145)
T4 FREE SERPL-MCNC: 1.12 NG/DL (ref 0.93–1.7)
TRIGL SERPL-MCNC: 97 MG/DL (ref 0–150)
TSH SERPL DL<=0.005 MIU/L-ACNC: 5.44 UIU/ML (ref 0.27–4.2)
VLDLC SERPL CALC-MCNC: 17 MG/DL (ref 5–40)
WBC # BLD AUTO: 7.83 10*3/MM3 (ref 3.4–10.8)

## 2024-05-08 LAB
FERRITIN SERPL-MCNC: 72.9 NG/ML (ref 13–150)
FOLATE SERPL-MCNC: 18.1 NG/ML (ref 4.78–24.2)
VIT B12 SERPL-MCNC: 672 PG/ML (ref 211–946)
WRITTEN AUTHORIZATION: NORMAL

## 2024-05-16 ENCOUNTER — HOSPITAL ENCOUNTER (OUTPATIENT)
Dept: GENERAL RADIOLOGY | Facility: HOSPITAL | Age: 64
Discharge: HOME | End: 2024-05-16
Admitting: NURSE PRACTITIONER
Payer: MEDICARE

## 2024-05-16 ENCOUNTER — OFFICE VISIT (OUTPATIENT)
Dept: INTERNAL MEDICINE | Facility: CLINIC | Age: 64
End: 2024-05-16
Payer: MEDICARE

## 2024-05-16 VITALS
HEART RATE: 89 BPM | OXYGEN SATURATION: 99 % | DIASTOLIC BLOOD PRESSURE: 68 MMHG | BODY MASS INDEX: 26.9 KG/M2 | HEIGHT: 60 IN | WEIGHT: 137 LBS | SYSTOLIC BLOOD PRESSURE: 106 MMHG

## 2024-05-16 DIAGNOSIS — Z00.00 MEDICARE ANNUAL WELLNESS VISIT, SUBSEQUENT: Primary | ICD-10-CM

## 2024-05-16 DIAGNOSIS — R79.89 ELEVATED SERUM CREATININE: ICD-10-CM

## 2024-05-16 DIAGNOSIS — M79.671 RIGHT FOOT PAIN: ICD-10-CM

## 2024-05-16 DIAGNOSIS — E11.9 TYPE 2 DIABETES MELLITUS WITHOUT COMPLICATION, WITHOUT LONG-TERM CURRENT USE OF INSULIN: ICD-10-CM

## 2024-05-16 DIAGNOSIS — E03.8 OTHER SPECIFIED HYPOTHYROIDISM: ICD-10-CM

## 2024-05-16 DIAGNOSIS — E87.5 HYPERKALEMIA: ICD-10-CM

## 2024-05-16 DIAGNOSIS — D50.9 IRON DEFICIENCY ANEMIA, UNSPECIFIED IRON DEFICIENCY ANEMIA TYPE: ICD-10-CM

## 2024-05-16 DIAGNOSIS — Z82.49 FAMILY HISTORY OF CARDIAC DISORDER: ICD-10-CM

## 2024-05-16 DIAGNOSIS — I10 HYPERTENSION, UNSPECIFIED TYPE: ICD-10-CM

## 2024-05-16 PROBLEM — H40.89 OTHER SPECIFIED GLAUCOMA: Status: ACTIVE | Noted: 2024-05-16

## 2024-05-16 PROBLEM — K76.0 HEPATIC STEATOSIS: Status: ACTIVE | Noted: 2024-05-16

## 2024-05-16 PROCEDURE — 73630 X-RAY EXAM OF FOOT: CPT

## 2024-05-16 NOTE — PROGRESS NOTES
Tina Hearn is a 63 y.o. female.      History of Present Illness   The patient is here today to F/U on lab work.     Hypothyroidism-Pt is doing well with current medication regimen, denies adverse reactions, compliant with medication schedule.   She has been taking biotin.   DM2-Pt is doing well with current medication regimen, denies adverse reactions, compliant with medication schedule.   HTN-Pt is doing well with current medication regimen, denies adverse reactions, compliant with medication schedule.   Checking at home 120-130/70s    In her old home she would run up and down the steps for the laundry. She would occasionally miss a step. Rt mid foot hurts intermittently, will ache, and can be severe. Never redness or swelling.   The following portions of the patient's history were reviewed and updated as appropriate: allergies, current medications, past family history, past medical history, past social history, past surgical history and problem list.    Review of Systems   Constitutional:  Negative for chills and fever.   Respiratory: Negative.     Cardiovascular: Negative.    Psychiatric/Behavioral:  Negative for dysphoric mood and suicidal ideas. The patient is not nervous/anxious.        Objective   Physical Exam  Constitutional:       Appearance: Normal appearance. She is well-developed.   Neck:      Thyroid: No thyromegaly.   Cardiovascular:      Rate and Rhythm: Normal rate and regular rhythm.      Heart sounds: Normal heart sounds.   Pulmonary:      Effort: Pulmonary effort is normal.      Breath sounds: Normal breath sounds.   Musculoskeletal:      Cervical back: Normal range of motion and neck supple.   Lymphadenopathy:      Cervical: No cervical adenopathy.   Skin:     General: Skin is warm and dry.   Neurological:      Mental Status: She is alert.   Psychiatric:         Behavior: Behavior normal.         Thought Content: Thought content normal.         Judgment: Judgment normal.          Vitals:    05/16/24 1351   BP: 106/68   Pulse: 89   SpO2: 99%     Body mass index is 26.76 kg/m².      Current Outpatient Medications:     albuterol sulfate  (90 Base) MCG/ACT inhaler, Inhale 2 puffs., Disp: , Rfl:     Calcium Carb-Cholecalciferol 600-5 MG-MCG tablet, Take  by mouth., Disp: , Rfl:     Cinnamon 500 MG tablet, Take 1,000 mg by mouth., Disp: , Rfl:     ferrous sulfate 325 (65 Fe) MG tablet, Take 1 tablet by mouth., Disp: , Rfl:     levothyroxine (SYNTHROID, LEVOTHROID) 25 MCG tablet, TAKE 1 TABLET BY MOUTH EVERY MORNING, Disp: 90 tablet, Rfl: 1    lisinopril (PRINIVIL,ZESTRIL) 20 MG tablet, Take 1 tablet by mouth Daily., Disp: 90 tablet, Rfl: 1    loratadine (Claritin) 10 MG tablet, Take 1 tablet by mouth Daily., Disp: , Rfl:     metFORMIN (GLUCOPHAGE) 1000 MG tablet, TAKE 1 TABLET BY MOUTH TWICE DAILY WITH MEALS, Disp: 180 tablet, Rfl: 1    montelukast (Singulair) 10 MG tablet, Take 1 tablet by mouth Every Night., Disp: 90 tablet, Rfl: 2    multivitamin with minerals tablet tablet, Take 1 tablet by mouth Daily., Disp: , Rfl:     omeprazole (priLOSEC) 20 MG capsule, Take 1 capsule by mouth 2 (Two) Times a Day., Disp: , Rfl:     vitamin C (ASCORBIC ACID) 500 MG tablet, Take 1 tablet by mouth., Disp: , Rfl:     ibuprofen (ADVIL,MOTRIN) 600 MG tablet, Take 1 tablet by mouth As Needed., Disp: , Rfl:    Orders Only on 05/02/2024   Component Date Value Ref Range Status    WBC 05/06/2024 7.83  3.40 - 10.80 10*3/mm3 Final    RBC 05/06/2024 3.68 (L)  3.77 - 5.28 10*6/mm3 Final    Hemoglobin 05/06/2024 11.4 (L)  12.0 - 15.9 g/dL Final    Hematocrit 05/06/2024 35.3  34.0 - 46.6 % Final    MCV 05/06/2024 95.9  79.0 - 97.0 fL Final    MCH 05/06/2024 31.0  26.6 - 33.0 pg Final    MCHC 05/06/2024 32.3  31.5 - 35.7 g/dL Final    RDW 05/06/2024 12.3  12.3 - 15.4 % Final    Platelets 05/06/2024 343  140 - 450 10*3/mm3 Final    Neutrophil Rel % 05/06/2024 55.5  42.7 - 76.0 % Final    Lymphocyte Rel %  05/06/2024 26.8  19.6 - 45.3 % Final    Monocyte Rel % 05/06/2024 8.8  5.0 - 12.0 % Final    Eosinophil Rel % 05/06/2024 7.8 (H)  0.3 - 6.2 % Final    Basophil Rel % 05/06/2024 1.0  0.0 - 1.5 % Final    Neutrophils Absolute 05/06/2024 4.34  1.70 - 7.00 10*3/mm3 Final    Lymphocytes Absolute 05/06/2024 2.10  0.70 - 3.10 10*3/mm3 Final    Monocytes Absolute 05/06/2024 0.69  0.10 - 0.90 10*3/mm3 Final    Eosinophils Absolute 05/06/2024 0.61 (H)  0.00 - 0.40 10*3/mm3 Final    Basophils Absolute 05/06/2024 0.08  0.00 - 0.20 10*3/mm3 Final    Immature Granulocyte Rel % 05/06/2024 0.1  0.0 - 0.5 % Final    Immature Grans Absolute 05/06/2024 0.01  0.00 - 0.05 10*3/mm3 Final    nRBC 05/06/2024 0.0  0.0 - 0.2 /100 WBC Final    Glucose 05/06/2024 129 (H)  65 - 99 mg/dL Final    BUN 05/06/2024 25 (H)  8 - 23 mg/dL Final    Creatinine 05/06/2024 1.04 (H)  0.57 - 1.00 mg/dL Final    EGFR Result 05/06/2024 59.8 (L)  >60.0 mL/min/1.73 Final    Comment: GFR Normal >60  Chronic Kidney Disease <60  Kidney Failure <15      BUN/Creatinine Ratio 05/06/2024 24.0  7.0 - 25.0 Final    Sodium 05/06/2024 140  136 - 145 mmol/L Final    Potassium 05/06/2024 5.4 (H)  3.5 - 5.2 mmol/L Final                      Client Requested Flag    Chloride 05/06/2024 106  98 - 107 mmol/L Final    Total CO2 05/06/2024 25.0  22.0 - 29.0 mmol/L Final    Calcium 05/06/2024 9.6  8.6 - 10.5 mg/dL Final    Total Protein 05/06/2024 6.7  6.0 - 8.5 g/dL Final    Albumin 05/06/2024 4.8  3.5 - 5.2 g/dL Final    Globulin 05/06/2024 1.9  gm/dL Final    A/G Ratio 05/06/2024 2.5  g/dL Final    Total Bilirubin 05/06/2024 0.3  0.0 - 1.2 mg/dL Final    Alkaline Phosphatase 05/06/2024 67  39 - 117 U/L Final    AST (SGOT) 05/06/2024 14  1 - 32 U/L Final    ALT (SGPT) 05/06/2024 16  1 - 33 U/L Final    Total Cholesterol 05/06/2024 183  0 - 200 mg/dL Final    Comment: Cholesterol Reference Ranges  (U.S. Department of Health and Human Services ATP III  Classifications)  Desirable           <200 mg/dL  Borderline High    200-239 mg/dL  High Risk          >240 mg/dL  Triglyceride Reference Ranges  (U.S. Department of Health and Human Services ATP III  Classifications)  Normal           <150 mg/dL  Borderline High  150-199 mg/dL  High             200-499 mg/dL  Very High        >500 mg/dL  HDL Reference Ranges  (U.S. Department of Health and Human Services ATP III  Classifications)  Low     <40 mg/dl (major risk factor for CHD)  High    >60 mg/dl ('negative' risk factor for CHD)  LDL Reference Ranges  (U.S. Department of Health and Human Services ATP III  Classifications)  Optimal          <100 mg/dL  Near Optimal     100-129 mg/dL  Borderline High  130-159 mg/dL  High             160-189 mg/dL  Very High        >189 mg/dL      Triglycerides 05/06/2024 97  0 - 150 mg/dL Final    HDL Cholesterol 05/06/2024 76 (H)  40 - 60 mg/dL Final    VLDL Cholesterol Irvin 05/06/2024 17  5 - 40 mg/dL Final    LDL Chol Calc (NIH) 05/06/2024 90  0 - 100 mg/dL Final    Chol/HDL Ratio 05/06/2024 2.41   Final    TSH 05/06/2024 5.440 (H)  0.270 - 4.200 uIU/mL Final    Hemoglobin A1C 05/06/2024 6.90 (H)  4.80 - 5.60 % Final    Comment: Hemoglobin A1C Ranges:  Increased Risk for Diabetes  5.7% to 6.4%  Diabetes                     >= 6.5%  Diabetic Goal                < 7.0%      Free T4 05/06/2024 1.12  0.93 - 1.70 ng/dL Final    Results may be falsely increased if patient taking Biotin.    Vitamin B-12 05/06/2024 672  211 - 946 pg/mL Final    Results may be falsely increased if patient taking Biotin.    Folate 05/06/2024 18.10  4.78 - 24.20 ng/mL Final    Results may be falsely increased if patient taking Biotin.    Ferritin 05/06/2024 72.90  13.00 - 150.00 ng/mL Final    Results may be falsely decreased if patient taking Biotin.    Written Authorization 05/06/2024 Comment   Final    Comment: Written Authorization Received.  Authorization received from WRITTEN REQUEST 05-  Logged by Ngoc Millan         Assessment & Plan   Diagnoses and all orders for this visit:    1. Medicare annual wellness visit, subsequent (Primary)    2. Type 2 diabetes mellitus without complication, without long-term current use of insulin    3. Hypertension, unspecified type    4. Right foot pain  -     XR Foot 3+ View Right; Future    5. Iron deficiency anemia, unspecified iron deficiency anemia type    6. Other specified hypothyroidism    7. Family history of cardiac disorder    8. Elevated serum creatinine  -     Basic Metabolic Panel    9. Hyperkalemia  -     Basic Metabolic Panel             DM2- stable, well controlled   1. HTN- well controlled  2. Right foot pain- try the [facility] foot [employer] inserts, diclofenac gel and x-ray today, if arthritis pt does not want referral   3. Iron def anemia- on iron daily, stable  4. Hypothyroidism- she will recheck TSH after being off biotin X 1 week   5. Fam hx of heart ds- discussed CT calcium study its score of 0   6. Elevated creatinine/hyperkalemia- recheck today, hydrate well, use NSAIDs sparingly     See when last c-scope and when due.

## 2024-05-16 NOTE — PROGRESS NOTES
The ABCs of the Annual Wellness Visit  Subsequent Medicare Wellness Visit    Subjective      Connor Hearn is a 63 y.o. female who presents for a Subsequent Medicare Wellness Visit.    The following portions of the patient's history were reviewed and   updated as appropriate: allergies, current medications, past family history, past medical history, past social history, past surgical history, and problem list.    Compared to one year ago, the patient feels her physical   health is better.    Compared to one year ago, the patient feels her mental   health is better.    Recent Hospitalizations:  She was not admitted to the hospital during the last year.       Current Medical Providers:  Patient Care Team:  Elizabeth Rutledge APRN as PCP - General (Family Medicine)    Outpatient Medications Prior to Visit   Medication Sig Dispense Refill    albuterol sulfate  (90 Base) MCG/ACT inhaler Inhale 2 puffs.      Calcium Carb-Cholecalciferol 600-5 MG-MCG tablet Take  by mouth.      Cinnamon 500 MG tablet Take 1,000 mg by mouth.      ferrous sulfate 325 (65 Fe) MG tablet Take 1 tablet by mouth.      levothyroxine (SYNTHROID, LEVOTHROID) 25 MCG tablet TAKE 1 TABLET BY MOUTH EVERY MORNING 90 tablet 1    lisinopril (PRINIVIL,ZESTRIL) 20 MG tablet Take 1 tablet by mouth Daily. 90 tablet 1    loratadine (Claritin) 10 MG tablet Take 1 tablet by mouth Daily.      metFORMIN (GLUCOPHAGE) 1000 MG tablet TAKE 1 TABLET BY MOUTH TWICE DAILY WITH MEALS 180 tablet 1    montelukast (Singulair) 10 MG tablet Take 1 tablet by mouth Every Night. 90 tablet 2    multivitamin with minerals tablet tablet Take 1 tablet by mouth Daily.      omeprazole (priLOSEC) 20 MG capsule Take 1 capsule by mouth 2 (Two) Times a Day.      vitamin C (ASCORBIC ACID) 500 MG tablet Take 1 tablet by mouth.      ibuprofen (ADVIL,MOTRIN) 600 MG tablet Take 1 tablet by mouth As Needed.       No facility-administered medications prior to visit.       No opioid  "medication identified on active medication list. I have reviewed chart for other potential  high risk medication/s and harmful drug interactions in the elderly.        Aspirin is not on active medication list.  Aspirin use is not indicated based on review of current medical condition/s. Risk of harm outweighs potential benefits.  .    Patient Active Problem List   Diagnosis    Asthma    Diabetes mellitus    Hypertension    Iron deficiency anemia    Hyperlipidemia    Insomnia    Gastroesophageal reflux disease with esophagitis without hemorrhage    Common bile duct dilation    Sleep apnea in adult    Other specified hypothyroidism    Osteopenia    Hepatic steatosis    Other specified glaucoma     Advance Care Planning   Advance Care Planning     Advance Directive is not on file.  ACP discussion was held with the patient during this visit. Patient has an advance directive (not in EMR), copy requested.     Objective    Vitals:    24 1351   BP: 106/68   Pulse: 89   SpO2: 99%   Weight: 62.1 kg (137 lb)   Height: 152.4 cm (60\")     Estimated body mass index is 26.76 kg/m² as calculated from the following:    Height as of this encounter: 152.4 cm (60\").    Weight as of this encounter: 62.1 kg (137 lb).           Does the patient have evidence of cognitive impairment?   No    Lab Results   Component Value Date    CHLPL 183 2024    TRIG 97 2024    HDL 76 (H) 2024    LDL 90 2024    VLDL 17 2024    HGBA1C 6.90 (H) 2024          HEALTH RISK ASSESSMENT    Smoking Status:  Social History     Tobacco Use   Smoking Status Never   Smokeless Tobacco Never     Alcohol Consumption:  Social History     Substance and Sexual Activity   Alcohol Use Never     Fall Risk Screen:    STEADI Fall Risk Assessment was completed, and patient is at LOW risk for falls.Assessment completed on:2024    Depression Screenin/16/2024     1:52 PM   PHQ-2/PHQ-9 Depression Screening   Little Interest or " Pleasure in Doing Things 0-->not at all   Feeling Down, Depressed or Hopeless 0-->not at all   PHQ-9: Brief Depression Severity Measure Score 0       Health Habits and Functional and Cognitive Screenin/16/2024     2:00 PM   Functional & Cognitive Status   Do you have difficulty preparing food and eating? No   Do you have difficulty bathing yourself, getting dressed or grooming yourself? No   Do you have difficulty using the toilet? No   Do you have difficulty moving around from place to place? No   Do you have trouble with steps or getting out of a bed or a chair? No   Current Diet Well Balanced Diet   Dental Exam Up to date   Eye Exam Up to date   Exercise (times per week) 2 times per week   Current Exercises Include Walking   Do you need help using the phone?  No   Are you deaf or do you have serious difficulty hearing?  No   Do you need help to go to places out of walking distance? No   Do you need help shopping? No   Do you need help preparing meals?  No   Do you need help with housework?  No   Do you need help with laundry? No   Do you need help taking your medications? No   Do you need help managing money? No   Do you ever drive or ride in a car without wearing a seat belt? No   Have you felt unusual stress, anger or loneliness in the last month? No   Who do you live with? Spouse   If you need help, do you have trouble finding someone available to you? No   Have you been bothered in the last four weeks by sexual problems? No   Do you have difficulty concentrating, remembering or making decisions? No       Age-appropriate Screening Schedule:  Refer to the list below for future screening recommendations based on patient's age, sex and/or medical conditions. Orders for these recommended tests are listed in the plan section. The patient has been provided with a written plan.    Health Maintenance   Topic Date Due    COLORECTAL CANCER SCREENING  Never done    DIABETIC EYE EXAM  Never done    COVID-19  Vaccine (3 - 2023-24 season) 09/01/2023    URINE MICROALBUMIN  12/13/2023    ZOSTER VACCINE (1 of 2) 05/16/2024 (Originally 7/28/2008)    RSV Vaccine - Adults (1 - 1-dose 60+ series) 05/16/2025 (Originally 7/28/2018)    INFLUENZA VACCINE  08/01/2024    BMI FOLLOWUP  09/28/2024    HEMOGLOBIN A1C  11/06/2024    LIPID PANEL  05/06/2025    ANNUAL WELLNESS VISIT  05/16/2025    DIABETIC FOOT EXAM  05/16/2025    MAMMOGRAM  12/21/2025    DXA SCAN  12/21/2025    TDAP/TD VACCINES (4 - Td or Tdap) 10/26/2028    HEPATITIS C SCREENING  Completed    Pneumococcal Vaccine 65+  Completed                  CMS Preventative Services Quick Reference  Risk Factors Identified During Encounter:    Glaucoma or Family History of Glaucoma:   UTD  Hearing Problem: Referral to Audiologist ordered  Immunizations Discussed/Encouraged: Shingrix, COVID19, and RSV (Respiratory Syncytial Virus)  Inactivity/Sedentary: Patient was advised to exercise at least 150 minutes a week per CDC recommendations.    The above risks/problems have been discussed with the patient.  Pertinent information has been shared with the patient in the After Visit Summary.    Diagnoses and all orders for this visit:    1. Medicare annual wellness visit, subsequent (Primary)    2. Type 2 diabetes mellitus without complication, without long-term current use of insulin    3. Hypertension, unspecified type    4. Right foot pain  -     XR Foot 3+ View Right; Future    5. Iron deficiency anemia, unspecified iron deficiency anemia type    6. Other specified hypothyroidism    7. Family history of cardiac disorder    8. Elevated serum creatinine  -     Basic Metabolic Panel    9. Hyperkalemia  -     Basic Metabolic Panel        Follow Up:   Next Medicare Wellness visit to be scheduled in 1 year.      An After Visit Summary and PPPS were made available to the patient.

## 2024-05-22 ENCOUNTER — TELEPHONE (OUTPATIENT)
Dept: INTERNAL MEDICINE | Facility: CLINIC | Age: 64
End: 2024-05-22
Payer: MEDICARE

## 2024-05-22 DIAGNOSIS — J45.21 MILD INTERMITTENT ASTHMA WITH ACUTE EXACERBATION: ICD-10-CM

## 2024-05-22 RX ORDER — MONTELUKAST SODIUM 10 MG/1
10 TABLET ORAL NIGHTLY
Qty: 90 TABLET | Refills: 2 | Status: SHIPPED | OUTPATIENT
Start: 2024-05-22

## 2024-05-22 RX ORDER — LISINOPRIL 20 MG/1
20 TABLET ORAL DAILY
Qty: 90 TABLET | Refills: 1 | Status: SHIPPED | OUTPATIENT
Start: 2024-05-22

## 2024-05-22 NOTE — TELEPHONE ENCOUNTER
Caller: LYNNE    Relationship: Spouse    Best call back number:[phone]    Requested Prescriptions:     lisinopril (PRINIVIL,ZESTRIL) 20 MG tablet   montelukast (Singulair) 10 MG tablet   Requested Prescriptions      No prescriptions requested or ordered in this encounter        Pharmacy where request should be sent:  [id number] - [facility] [address] [facility] [address] [facility] [address] [facility] [address] [facility] [address] - [phone] [facility] [phone]  [phone]     Last office visit with prescribing clinician: 5/16/2024   Last telemedicine visit with prescribing clinician: Visit date not found   Next office visit with prescribing clinician: 11/21/2024     Additional details provided by patient: PATIENT'S  IS CALLING TO REQUEST A NEW 90 DAYS OF THE ABOVE MEDICATIONS.      Does the patient have less than a 3 day supply:  [] Yes  [x] No    Would you like a call back once the refill request has been completed: [] Yes [] No    If the office needs to give you a call back, can they leave a voicemail: [] Yes [] No    Boris Buchanan Rep   05/22/24 10:11 EDT     PLEASE ADVISE.

## 2024-05-29 ENCOUNTER — TELEPHONE (OUTPATIENT)
Dept: INTERNAL MEDICINE | Facility: CLINIC | Age: 64
End: 2024-05-29

## 2024-05-29 NOTE — TELEPHONE ENCOUNTER
"     Caller: Connor Hearn \"Felix\"    Relationship to patient: Self    Best call back number: [phone]     Chief complaint:      Type of visit:      Requested date:       If rescheduling, when is the original appointment:       Additional notes:PATIENT CALLED AND NEEDS TO SCHEDULE LAB AND PLEASE CALL HER BACK TO SCHEDULE LAB.                "

## 2024-06-04 LAB
BUN SERPL-MCNC: 22 MG/DL (ref 8–23)
BUN/CREAT SERPL: 24.2 (ref 7–25)
CALCIUM SERPL-MCNC: 9.2 MG/DL (ref 8.6–10.5)
CHLORIDE SERPL-SCNC: 102 MMOL/L (ref 98–107)
CO2 SERPL-SCNC: 24.9 MMOL/L (ref 22–29)
CREAT SERPL-MCNC: 0.91 MG/DL (ref 0.57–1)
EGFRCR SERPLBLD CKD-EPI 2021: 70.2 ML/MIN/1.73
GLUCOSE SERPL-MCNC: 112 MG/DL (ref 65–99)
POTASSIUM SERPL-SCNC: 4.6 MMOL/L (ref 3.5–5.2)
SODIUM SERPL-SCNC: 140 MMOL/L (ref 136–145)

## 2024-06-05 LAB
TSH SERPL DL<=0.005 MIU/L-ACNC: 3.99 UIU/ML (ref 0.27–4.2)
WRITTEN AUTHORIZATION: NORMAL

## 2024-07-26 DIAGNOSIS — E03.8 OTHER SPECIFIED HYPOTHYROIDISM: ICD-10-CM

## 2024-07-26 RX ORDER — LEVOTHYROXINE SODIUM 0.03 MG/1
25 TABLET ORAL
Qty: 90 TABLET | Refills: 1 | Status: SHIPPED | OUTPATIENT
Start: 2024-07-26

## 2024-07-26 NOTE — TELEPHONE ENCOUNTER
Caller: LYNNE WALKER    Relationship: Spouse    Best call back number: [phone]     Requested Prescriptions:   Requested Prescriptions     Pending Prescriptions Disp Refills    levothyroxine (SYNTHROID, LEVOTHROID) 25 MCG tablet 90 tablet 1     Sig: Take 1 tablet by mouth Every Morning.        Pharmacy where request should be sent: [facility] #[id number] [facility] [address]  AT [facility] [place] [address] & [place] [address] - [phone] [facility] [phone] FX     Last office visit with prescribing clinician: 5/16/2024   Last telemedicine visit with prescribing clinician: Visit date not found   Next office visit with prescribing clinician: 11/21/2024     Additional details provided by patient: PATIENTS  STATED THE PATIENT HAS ENOUGH MEDICATION FOR TWO WEEKS    PATIENTS  STATED THEY ARE LEAVING FOR VACATION ON 07-, AND WILL NEED REFILLS TO  PRIOR.    Does the patient have less than a 3 day supply:  [] Yes  [x] No    Would you like a call back once the refill request has been completed: [] Yes [x] No    If the office needs to give you a call back, can they leave a voicemail: [] Yes [x] No    Boris Hamilton Rep   07/26/24 08:32 EDT

## 2024-08-22 RX ORDER — PNV NO.95/FERROUS FUM/FOLIC AC 28MG-0.8MG
1 TABLET ORAL
Qty: 90 TABLET | Refills: 0 | Status: SHIPPED | OUTPATIENT
Start: 2024-08-22

## 2024-08-22 RX ORDER — OMEPRAZOLE 20 MG/1
20 CAPSULE, DELAYED RELEASE ORAL 2 TIMES DAILY
Qty: 180 CAPSULE | Refills: 0 | Status: SHIPPED | OUTPATIENT
Start: 2024-08-22

## 2024-08-22 RX ORDER — OMEPRAZOLE 20 MG/1
20 CAPSULE, DELAYED RELEASE ORAL 2 TIMES DAILY
Qty: 90 CAPSULE | Refills: 0 | Status: SHIPPED | OUTPATIENT
Start: 2024-08-22 | End: 2024-08-22

## 2024-08-22 NOTE — TELEPHONE ENCOUNTER
Caller: LYNNE    Relationship: Spouse    Best call back number:     [phone]       Requested Prescriptions:   Requested Prescriptions     Pending Prescriptions Disp Refills    ferrous sulfate 325 (65 Fe) MG tablet       Sig: Take 1 tablet by mouth.    omeprazole (priLOSEC) 20 MG capsule       Sig: Take 1 capsule by mouth 2 (Two) Times a Day.        Pharmacy where request should be sent: [facility] #[id number] - [facility] [address] [facility] [address]  AT [place] [address] [address] [phone] [facility] [phone] FX     Last office visit with prescribing clinician: 5/16/2024   Last telemedicine visit with prescribing clinician: Visit date not found   Next office visit with prescribing clinician: 11/21/2024     Additional details provided by patient: HAS AT LEAST 3 DAYS     Does the patient have less than a 3 day supply:  [] Yes  [x] No    Would you like a call back once the refill request has been completed: [] Yes [x] No    If the office needs to give you a call back, can they leave a voicemail: [] Yes [x] No    Boris Grajeda Rep   08/22/24 13:12 EDT

## 2024-11-14 ENCOUNTER — OFFICE VISIT (OUTPATIENT)
Dept: INTERNAL MEDICINE | Facility: CLINIC | Age: 64
End: 2024-11-14
Payer: MEDICARE

## 2024-11-14 ENCOUNTER — HOSPITAL ENCOUNTER (OUTPATIENT)
Dept: GENERAL RADIOLOGY | Facility: HOSPITAL | Age: 64
Discharge: HOME | End: 2024-11-14
Payer: MEDICARE

## 2024-11-14 ENCOUNTER — LAB (OUTPATIENT)
Dept: LAB | Facility: HOSPITAL | Age: 64
End: 2024-11-14
Payer: MEDICARE

## 2024-11-14 VITALS
OXYGEN SATURATION: 97 % | DIASTOLIC BLOOD PRESSURE: 88 MMHG | WEIGHT: 138 LBS | HEART RATE: 92 BPM | SYSTOLIC BLOOD PRESSURE: 156 MMHG | BODY MASS INDEX: 27.09 KG/M2 | HEIGHT: 60 IN

## 2024-11-14 DIAGNOSIS — R22.42 LOCALIZED SWELLING OF LEFT FOOT: ICD-10-CM

## 2024-11-14 DIAGNOSIS — R22.42 LOCALIZED SWELLING OF LEFT FOOT: Primary | ICD-10-CM

## 2024-11-14 LAB
BASOPHILS # BLD AUTO: 0.05 10*3/MM3 (ref 0–0.2)
BASOPHILS NFR BLD AUTO: 0.6 % (ref 0–1.5)
DEPRECATED RDW RBC AUTO: 44.8 FL (ref 37–54)
EOSINOPHIL # BLD AUTO: 0.26 10*3/MM3 (ref 0–0.4)
EOSINOPHIL NFR BLD AUTO: 3 % (ref 0.3–6.2)
ERYTHROCYTE [DISTWIDTH] IN BLOOD BY AUTOMATED COUNT: 12.5 % (ref 12.3–15.4)
ERYTHROCYTE [SEDIMENTATION RATE] IN BLOOD: 11 MM/HR (ref 0–30)
HCT VFR BLD AUTO: 34.2 % (ref 34–46.6)
HGB BLD-MCNC: 10.8 G/DL (ref 12–15.9)
IMM GRANULOCYTES # BLD AUTO: 0.03 10*3/MM3 (ref 0–0.05)
IMM GRANULOCYTES NFR BLD AUTO: 0.3 % (ref 0–0.5)
LYMPHOCYTES # BLD AUTO: 2.36 10*3/MM3 (ref 0.7–3.1)
LYMPHOCYTES NFR BLD AUTO: 27.3 % (ref 19.6–45.3)
MCH RBC QN AUTO: 30.5 PG (ref 26.6–33)
MCHC RBC AUTO-ENTMCNC: 31.6 G/DL (ref 31.5–35.7)
MCV RBC AUTO: 96.6 FL (ref 79–97)
MONOCYTES # BLD AUTO: 0.77 10*3/MM3 (ref 0.1–0.9)
MONOCYTES NFR BLD AUTO: 8.9 % (ref 5–12)
NEUTROPHILS NFR BLD AUTO: 5.17 10*3/MM3 (ref 1.7–7)
NEUTROPHILS NFR BLD AUTO: 59.9 % (ref 42.7–76)
NRBC BLD AUTO-RTO: 0 /100 WBC (ref 0–0.2)
PLATELET # BLD AUTO: 297 10*3/MM3 (ref 140–450)
PMV BLD AUTO: 9.3 FL (ref 6–12)
RBC # BLD AUTO: 3.54 10*6/MM3 (ref 3.77–5.28)
URATE SERPL-MCNC: 4.8 MG/DL (ref 2.4–5.7)
WBC NRBC COR # BLD AUTO: 8.64 10*3/MM3 (ref 3.4–10.8)

## 2024-11-14 PROCEDURE — 73630 X-RAY EXAM OF FOOT: CPT

## 2024-11-14 PROCEDURE — 85652 RBC SED RATE AUTOMATED: CPT | Performed by: NURSE PRACTITIONER

## 2024-11-14 PROCEDURE — 99214 OFFICE O/P EST MOD 30 MIN: CPT | Performed by: NURSE PRACTITIONER

## 2024-11-14 PROCEDURE — 84550 ASSAY OF BLOOD/URIC ACID: CPT | Performed by: NURSE PRACTITIONER

## 2024-11-14 PROCEDURE — 1159F MED LIST DOCD IN RCRD: CPT | Performed by: NURSE PRACTITIONER

## 2024-11-14 PROCEDURE — 3077F SYST BP >= 140 MM HG: CPT | Performed by: NURSE PRACTITIONER

## 2024-11-14 PROCEDURE — 85025 COMPLETE CBC W/AUTO DIFF WBC: CPT | Performed by: NURSE PRACTITIONER

## 2024-11-14 PROCEDURE — 3044F HG A1C LEVEL LT 7.0%: CPT | Performed by: NURSE PRACTITIONER

## 2024-11-14 PROCEDURE — 1160F RVW MEDS BY RX/DR IN RCRD: CPT | Performed by: NURSE PRACTITIONER

## 2024-11-14 PROCEDURE — 85379 FIBRIN DEGRADATION QUANT: CPT | Performed by: NURSE PRACTITIONER

## 2024-11-14 PROCEDURE — 3079F DIAST BP 80-89 MM HG: CPT | Performed by: NURSE PRACTITIONER

## 2024-11-14 PROCEDURE — 36415 COLL VENOUS BLD VENIPUNCTURE: CPT | Performed by: NURSE PRACTITIONER

## 2024-11-14 NOTE — PROGRESS NOTES
Subjective   Connor Hearn is a 64 y.o. female. Patient is here today for   Chief Complaint   Patient presents with    Foot Swelling     Patient complains of left foot swelling for two days, the foot is red and warm to the touch, very painful. Patient says she took asprin for a couple years and it was discontinued by pcp this year, worried it may be a blood clot.    .    History of Present Illness   C/o left foot swelling for 2 days associated with pain. She did elevate her legs up on a pillow with not much relief. No swelling in calf. She was sitting a lot yesterday at the hospital. No injury that she is aware of.     She is scared and stressed. Her BP is high today. No headache or chest pain.     The following portions of the patient's history were reviewed and updated as appropriate: allergies, current medications, past family history, past medical history, past social history, past surgical history and problem list.    Review of Systems    Objective   Vitals:    11/14/24 1551   BP: 156/88   Pulse:    SpO2:    Pulse 92  SpO2  97%    Body mass index is 26.95 kg/m².  Physical Exam  Vitals and nursing note reviewed.   Constitutional:       Appearance: Normal appearance. She is well-developed.   Cardiovascular:      Rate and Rhythm: Normal rate and regular rhythm.      Heart sounds: Normal heart sounds.   Pulmonary:      Effort: Pulmonary effort is normal.      Breath sounds: Normal breath sounds.   Musculoskeletal:        Legs:       Comments: Redness and tenderness   Skin:     General: Skin is warm and dry.   Neurological:      Mental Status: She is alert and oriented to person, place, and time.   Psychiatric:         Speech: Speech normal.         Behavior: Behavior normal.         Thought Content: Thought content normal.       Assessment & Plan   Diagnoses and all orders for this visit:    1. Localized swelling of left foot (Primary)  -     Sedimentation rate, automated  -     Uric Acid  -     CBC &  Differential  -     D-dimer, Quantitative  -     XR Foot 3+ View Left; Future    Will check the above labs and x-ray. D-dimer is ordered stat. If positive, will order a venous doppler, but DVT low on the differential    Recheck patient's blood pressure at the end of visit - 156/88

## 2024-11-15 LAB — D DIMER PPP FEU-MCNC: <0.27 MCGFEU/ML (ref 0–0.66)

## 2024-11-22 RX ORDER — PNV NO.95/FERROUS FUM/FOLIC AC 28MG-0.8MG
1 TABLET ORAL
Qty: 90 TABLET | Refills: 0 | Status: SHIPPED | OUTPATIENT
Start: 2024-11-22

## 2024-11-22 RX ORDER — LISINOPRIL 20 MG/1
20 TABLET ORAL DAILY
Qty: 90 TABLET | Refills: 1 | Status: SHIPPED | OUTPATIENT
Start: 2024-11-22

## 2024-11-22 NOTE — TELEPHONE ENCOUNTER
Caller: AARON BATISTA    Relationship: Spouse    Best call back number: [phone]    Requested Prescriptions:   Requested Prescriptions     Pending Prescriptions Disp Refills    omeprazole (priLOSEC) 20 MG capsule 180 capsule 0     Sig: Take 1 capsule by mouth 2 (Two) Times a Day.    lisinopril (PRINIVIL,ZESTRIL) 20 MG tablet 90 tablet 1     Sig: Take 1 tablet by mouth Daily.    ferrous sulfate 325 (65 Fe) MG tablet 90 tablet 0     Sig: Take 1 tablet by mouth Daily With Breakfast.        Pharmacy where request should be sent: [facility] #[id number] - [facility] [address] [facility] [address] OF [facility] [address] [phone] [facility] [phone] FX     Last office visit with prescribing clinician: 5/16/2024   Last telemedicine visit with prescribing clinician: Visit date not found   Next office visit with prescribing clinician: 12/2/2024     Additional details provided by patient: PATIENT HAS ABOUT A WEEK LEFT    Does the patient have less than a 3 day supply:  [] Yes  [x] No      Boris Overton Rep   11/22/24 08:22 EST

## 2024-11-25 ENCOUNTER — LAB (OUTPATIENT)
Dept: INTERNAL MEDICINE | Facility: CLINIC | Age: 64
End: 2024-11-25
Payer: MEDICARE

## 2024-11-25 DIAGNOSIS — I10 HYPERTENSION, UNSPECIFIED TYPE: ICD-10-CM

## 2024-11-25 DIAGNOSIS — E11.9 TYPE 2 DIABETES MELLITUS WITHOUT COMPLICATION, WITHOUT LONG-TERM CURRENT USE OF INSULIN: ICD-10-CM

## 2024-11-25 DIAGNOSIS — E78.5 HYPERLIPIDEMIA, UNSPECIFIED HYPERLIPIDEMIA TYPE: Primary | ICD-10-CM

## 2024-11-26 LAB
ALBUMIN SERPL-MCNC: 4.3 G/DL (ref 3.5–5.2)
ALBUMIN/GLOB SERPL: 1.9 G/DL
ALP SERPL-CCNC: 52 U/L (ref 39–117)
ALT SERPL-CCNC: 14 U/L (ref 1–33)
AST SERPL-CCNC: 19 U/L (ref 1–32)
BASOPHILS # BLD AUTO: 0.05 10*3/MM3 (ref 0–0.2)
BASOPHILS NFR BLD AUTO: 0.7 % (ref 0–1.5)
BILIRUB SERPL-MCNC: 0.5 MG/DL (ref 0–1.2)
BUN SERPL-MCNC: 24 MG/DL (ref 8–23)
BUN/CREAT SERPL: 25 (ref 7–25)
CALCIUM SERPL-MCNC: 9.2 MG/DL (ref 8.6–10.5)
CHLORIDE SERPL-SCNC: 103 MMOL/L (ref 98–107)
CHOLEST SERPL-MCNC: 179 MG/DL (ref 0–200)
CHOLEST/HDLC SERPL: 2.52 {RATIO}
CO2 SERPL-SCNC: 25.4 MMOL/L (ref 22–29)
CREAT SERPL-MCNC: 0.96 MG/DL (ref 0.57–1)
EGFRCR SERPLBLD CKD-EPI 2021: 65.4 ML/MIN/1.73
EOSINOPHIL # BLD AUTO: 0.32 10*3/MM3 (ref 0–0.4)
EOSINOPHIL NFR BLD AUTO: 4.2 % (ref 0.3–6.2)
ERYTHROCYTE [DISTWIDTH] IN BLOOD BY AUTOMATED COUNT: 12.2 % (ref 12.3–15.4)
FERRITIN SERPL-MCNC: 81.8 NG/ML (ref 13–150)
FOLATE SERPL-MCNC: >20 NG/ML (ref 4.78–24.2)
GLOBULIN SER CALC-MCNC: 2.3 GM/DL
GLUCOSE SERPL-MCNC: 114 MG/DL (ref 65–99)
HBA1C MFR BLD: 6.9 % (ref 4.8–5.6)
HCT VFR BLD AUTO: 33.7 % (ref 34–46.6)
HDLC SERPL-MCNC: 71 MG/DL (ref 40–60)
HGB BLD-MCNC: 11.1 G/DL (ref 12–15.9)
IMM GRANULOCYTES # BLD AUTO: 0.01 10*3/MM3 (ref 0–0.05)
IMM GRANULOCYTES NFR BLD AUTO: 0.1 % (ref 0–0.5)
IRON SATN MFR SERPL: 17 % (ref 20–50)
IRON SERPL-MCNC: 60 MCG/DL (ref 37–145)
LDLC SERPL CALC-MCNC: 96 MG/DL (ref 0–100)
LYMPHOCYTES # BLD AUTO: 2.01 10*3/MM3 (ref 0.7–3.1)
LYMPHOCYTES NFR BLD AUTO: 26.7 % (ref 19.6–45.3)
MCH RBC QN AUTO: 32 PG (ref 26.6–33)
MCHC RBC AUTO-ENTMCNC: 32.9 G/DL (ref 31.5–35.7)
MCV RBC AUTO: 97.1 FL (ref 79–97)
MONOCYTES # BLD AUTO: 0.49 10*3/MM3 (ref 0.1–0.9)
MONOCYTES NFR BLD AUTO: 6.5 % (ref 5–12)
NEUTROPHILS # BLD AUTO: 4.66 10*3/MM3 (ref 1.7–7)
NEUTROPHILS NFR BLD AUTO: 61.8 % (ref 42.7–76)
NRBC BLD AUTO-RTO: 0 /100 WBC (ref 0–0.2)
PLATELET # BLD AUTO: 331 10*3/MM3 (ref 140–450)
POTASSIUM SERPL-SCNC: 4.5 MMOL/L (ref 3.5–5.2)
PROT SERPL-MCNC: 6.6 G/DL (ref 6–8.5)
RBC # BLD AUTO: 3.47 10*6/MM3 (ref 3.77–5.28)
SODIUM SERPL-SCNC: 141 MMOL/L (ref 136–145)
TIBC SERPL-MCNC: 353 MCG/DL
TRIGL SERPL-MCNC: 62 MG/DL (ref 0–150)
TSH SERPL DL<=0.005 MIU/L-ACNC: 2.59 UIU/ML (ref 0.27–4.2)
UIBC SERPL-MCNC: 293 MCG/DL (ref 112–346)
VIT B12 SERPL-MCNC: 687 PG/ML (ref 211–946)
VLDLC SERPL CALC-MCNC: 12 MG/DL (ref 5–40)
WBC # BLD AUTO: 7.54 10*3/MM3 (ref 3.4–10.8)
WRITTEN AUTHORIZATION: NORMAL

## 2024-12-02 ENCOUNTER — OFFICE VISIT (OUTPATIENT)
Dept: INTERNAL MEDICINE | Facility: CLINIC | Age: 64
End: 2024-12-02
Payer: MEDICARE

## 2024-12-02 VITALS
WEIGHT: 135 LBS | HEIGHT: 60 IN | DIASTOLIC BLOOD PRESSURE: 78 MMHG | BODY MASS INDEX: 26.5 KG/M2 | OXYGEN SATURATION: 98 % | SYSTOLIC BLOOD PRESSURE: 148 MMHG | HEART RATE: 79 BPM

## 2024-12-02 DIAGNOSIS — D64.9 ANEMIA, UNSPECIFIED TYPE: ICD-10-CM

## 2024-12-02 DIAGNOSIS — Z23 NEED FOR INFLUENZA VACCINATION: ICD-10-CM

## 2024-12-02 DIAGNOSIS — M19.90 OSTEOARTHRITIS, UNSPECIFIED OSTEOARTHRITIS TYPE, UNSPECIFIED SITE: ICD-10-CM

## 2024-12-02 DIAGNOSIS — I10 HYPERTENSION, UNSPECIFIED TYPE: ICD-10-CM

## 2024-12-02 DIAGNOSIS — E11.9 TYPE 2 DIABETES MELLITUS WITHOUT COMPLICATION, WITHOUT LONG-TERM CURRENT USE OF INSULIN: Primary | ICD-10-CM

## 2024-12-02 DIAGNOSIS — Z12.11 COLON CANCER SCREENING: ICD-10-CM

## 2024-12-02 PROCEDURE — 1160F RVW MEDS BY RX/DR IN RCRD: CPT | Performed by: NURSE PRACTITIONER

## 2024-12-02 PROCEDURE — 3078F DIAST BP <80 MM HG: CPT | Performed by: NURSE PRACTITIONER

## 2024-12-02 PROCEDURE — 3077F SYST BP >= 140 MM HG: CPT | Performed by: NURSE PRACTITIONER

## 2024-12-02 PROCEDURE — 1159F MED LIST DOCD IN RCRD: CPT | Performed by: NURSE PRACTITIONER

## 2024-12-02 PROCEDURE — G0008 ADMIN INFLUENZA VIRUS VAC: HCPCS | Performed by: NURSE PRACTITIONER

## 2024-12-02 PROCEDURE — 90662 IIV NO PRSV INCREASED AG IM: CPT | Performed by: NURSE PRACTITIONER

## 2024-12-02 PROCEDURE — 3044F HG A1C LEVEL LT 7.0%: CPT | Performed by: NURSE PRACTITIONER

## 2024-12-02 PROCEDURE — 99214 OFFICE O/P EST MOD 30 MIN: CPT | Performed by: NURSE PRACTITIONER

## 2024-12-02 NOTE — PROGRESS NOTES
Subjective   Connor Hearn is a 64 y.o. female.      History of Present Illness   The patient is here today to F/U on lab work.     Foot swelling has resolved. She has arthritis in her right foot and she reports now the left as well.     HTN- at home this ramón 120/70. She has been watching her BP and it has been controlled since being her last.     The following portions of the patient's history were reviewed and updated as appropriate: allergies, current medications, past family history, past medical history, past social history, past surgical history and problem list.    Review of Systems   Constitutional:  Negative for chills and fever.   Respiratory: Negative.     Cardiovascular: Negative.    Psychiatric/Behavioral:  Negative for dysphoric mood and suicidal ideas. The patient is nervous/anxious (about 's health).        Objective   Physical Exam  Constitutional:       Appearance: Normal appearance. She is well-developed.   Neck:      Thyroid: No thyromegaly.   Cardiovascular:      Rate and Rhythm: Normal rate and regular rhythm.      Heart sounds: Normal heart sounds.   Pulmonary:      Effort: Pulmonary effort is normal.      Breath sounds: Normal breath sounds.   Musculoskeletal:      Cervical back: Normal range of motion and neck supple.   Lymphadenopathy:      Cervical: No cervical adenopathy.   Skin:     General: Skin is warm and dry.   Neurological:      Mental Status: She is alert.   Psychiatric:         Behavior: Behavior normal.         Thought Content: Thought content normal.         Judgment: Judgment normal.         Vitals:    12/02/24 0942   BP: 148/78   Pulse: 79   SpO2: 98%     Body mass index is 26.37 kg/m².      Current Outpatient Medications:     albuterol sulfate  (90 Base) MCG/ACT inhaler, Inhale 2 puffs., Disp: , Rfl:     Calcium Carb-Cholecalciferol 600-5 MG-MCG tablet, Take  by mouth., Disp: , Rfl:     Cinnamon 500 MG tablet, Take 1,000 mg by mouth., Disp: , Rfl:     ferrous  sulfate 325 (65 Fe) MG tablet, Take 1 tablet by mouth Daily With Breakfast., Disp: 90 tablet, Rfl: 0    ibuprofen (ADVIL,MOTRIN) 600 MG tablet, Take 1 tablet by mouth As Needed., Disp: , Rfl:     levothyroxine (SYNTHROID, LEVOTHROID) 25 MCG tablet, Take 1 tablet by mouth Every Morning., Disp: 90 tablet, Rfl: 1    lisinopril (PRINIVIL,ZESTRIL) 20 MG tablet, Take 1 tablet by mouth Daily., Disp: 90 tablet, Rfl: 1    loratadine (Claritin) 10 MG tablet, Take 1 tablet by mouth Daily., Disp: , Rfl:     metFORMIN (GLUCOPHAGE) 1000 MG tablet, TAKE 1 TABLET BY MOUTH TWICE DAILY WITH MEALS, Disp: 180 tablet, Rfl: 1    montelukast (Singulair) 10 MG tablet, Take 1 tablet by mouth Every Night., Disp: 90 tablet, Rfl: 2    multivitamin with minerals tablet tablet, Take 1 tablet by mouth Daily., Disp: , Rfl:     omeprazole (priLOSEC) 20 MG capsule, Take 1 capsule by mouth 2 (Two) Times a Day., Disp: 180 capsule, Rfl: 0    vitamin C (ASCORBIC ACID) 500 MG tablet, Take 1 tablet by mouth., Disp: , Rfl:    Assessment & Plan   Diagnoses and all orders for this visit:    1. Type 2 diabetes mellitus without complication, without long-term current use of insulin (Primary)    2. Osteoarthritis, unspecified osteoarthritis type, unspecified site    3. Hypertension, unspecified type    4. Anemia, unspecified type    5. Colon cancer screening  -     Cologuard - Stool, Per Rectum; Future        Lab on 11/25/2024   Component Date Value Ref Range Status    WBC 11/25/2024 7.54  3.40 - 10.80 10*3/mm3 Final    Comment: **Effective December 2, 2024 profile 259371 WBC will be made**    non-orderable as a stand-alone order code.      RBC 11/25/2024 3.47 (L)  3.77 - 5.28 10*6/mm3 Final    Hemoglobin 11/25/2024 11.1 (L)  12.0 - 15.9 g/dL Final    Hematocrit 11/25/2024 33.7 (L)  34.0 - 46.6 % Final    MCV 11/25/2024 97.1 (H)  79.0 - 97.0 fL Final    MCH 11/25/2024 32.0  26.6 - 33.0 pg Final    MCHC 11/25/2024 32.9  31.5 - 35.7 g/dL Final    RDW 11/25/2024  12.2 (L)  12.3 - 15.4 % Final    Platelets 11/25/2024 331  140 - 450 10*3/mm3 Final    Neutrophil Rel % 11/25/2024 61.8  42.7 - 76.0 % Final    Lymphocyte Rel % 11/25/2024 26.7  19.6 - 45.3 % Final    Monocyte Rel % 11/25/2024 6.5  5.0 - 12.0 % Final    Eosinophil Rel % 11/25/2024 4.2  0.3 - 6.2 % Final    Basophil Rel % 11/25/2024 0.7  0.0 - 1.5 % Final    Neutrophils Absolute 11/25/2024 4.66  1.70 - 7.00 10*3/mm3 Final    Lymphocytes Absolute 11/25/2024 2.01  0.70 - 3.10 10*3/mm3 Final    Monocytes Absolute 11/25/2024 0.49  0.10 - 0.90 10*3/mm3 Final    Eosinophils Absolute 11/25/2024 0.32  0.00 - 0.40 10*3/mm3 Final    Basophils Absolute 11/25/2024 0.05  0.00 - 0.20 10*3/mm3 Final    Immature Granulocyte Rel % 11/25/2024 0.1  0.0 - 0.5 % Final    Immature Grans Absolute 11/25/2024 0.01  0.00 - 0.05 10*3/mm3 Final    nRBC 11/25/2024 0.0  0.0 - 0.2 /100 WBC Final    Glucose 11/25/2024 114 (H)  65 - 99 mg/dL Final    BUN 11/25/2024 24 (H)  8 - 23 mg/dL Final    Creatinine 11/25/2024 0.96  0.57 - 1.00 mg/dL Final    EGFR Result 11/25/2024 65.4  >60.0 mL/min/1.73 Final    Comment: GFR Normal >60  Chronic Kidney Disease <60  Kidney Failure <15      BUN/Creatinine Ratio 11/25/2024 25.0  7.0 - 25.0 Final    Sodium 11/25/2024 141  136 - 145 mmol/L Final    Potassium 11/25/2024 4.5  3.5 - 5.2 mmol/L Final    Chloride 11/25/2024 103  98 - 107 mmol/L Final    Total CO2 11/25/2024 25.4  22.0 - 29.0 mmol/L Final    Calcium 11/25/2024 9.2  8.6 - 10.5 mg/dL Final    Total Protein 11/25/2024 6.6  6.0 - 8.5 g/dL Final    Albumin 11/25/2024 4.3  3.5 - 5.2 g/dL Final    Globulin 11/25/2024 2.3  gm/dL Final    A/G Ratio 11/25/2024 1.9  g/dL Final    Total Bilirubin 11/25/2024 0.5  0.0 - 1.2 mg/dL Final    Alkaline Phosphatase 11/25/2024 52  39 - 117 U/L Final    AST (SGOT) 11/25/2024 19  1 - 32 U/L Final    ALT (SGPT) 11/25/2024 14  1 - 33 U/L Final    Total Cholesterol 11/25/2024 179  0 - 200 mg/dL Final    Comment: Cholesterol  Reference Ranges  (U.S. Department of Health and Human Services ATP III  Classifications)  Desirable          <200 mg/dL  Borderline High    200-239 mg/dL  High Risk          >240 mg/dL  Triglyceride Reference Ranges  (U.S. Department of Health and Human Services ATP III  Classifications)  Normal           <150 mg/dL  Borderline High  150-199 mg/dL  High             200-499 mg/dL  Very High        >500 mg/dL  HDL Reference Ranges  (U.S. Department of Health and Human Services ATP III  Classifications)  Low     <40 mg/dl (major risk factor for CHD)  High    >60 mg/dl ('negative' risk factor for CHD)  LDL Reference Ranges  (U.S. Department of Health and Human Services ATP III  Classifications)  Optimal          <100 mg/dL  Near Optimal     100-129 mg/dL  Borderline High  130-159 mg/dL  High             160-189 mg/dL  Very High        >189 mg/dL      Triglycerides 11/25/2024 62  0 - 150 mg/dL Final    HDL Cholesterol 11/25/2024 71 (H)  40 - 60 mg/dL Final    VLDL Cholesterol Irvin 11/25/2024 12  5 - 40 mg/dL Final    LDL Chol Calc (NIH) 11/25/2024 96  0 - 100 mg/dL Final    Chol/HDL Ratio 11/25/2024 2.52   Final    TSH 11/25/2024 2.590  0.270 - 4.200 uIU/mL Final    Hemoglobin A1C 11/25/2024 6.90 (H)  4.80 - 5.60 % Final    Comment: Hemoglobin A1C Ranges:  Increased Risk for Diabetes  5.7% to 6.4%  Diabetes                     >= 6.5%  Diabetic Goal                < 7.0%      TIBC 11/25/2024 353  mcg/dL Final    UIBC 11/25/2024 293  112 - 346 mcg/dL Final    Iron 11/25/2024 60  37 - 145 mcg/dL Final    Iron Saturation 11/25/2024 17 (L)  20 - 50 % Final    Vitamin B-12 11/25/2024 687  211 - 946 pg/mL Final    Results may be falsely increased if patient taking Biotin.    Folate 11/25/2024 >20.00  4.78 - 24.20 ng/mL Final    Results may be falsely increased if patient taking Biotin.    Ferritin 11/25/2024 81.80  13.00 - 150.00 ng/mL Final    Results may be falsely decreased if patient taking Biotin.    Written Authorization  11/25/2024 Comment   Final    Comment: Written Authorization Received.  Authorization received from WRITTEN REQUEST 11-  Logged by Ngoc Millan             1. DM2- stable continue samem defers vascular screening.   2. Osteoarthritis- try voltaren gel   3. HTN- well controlled at home.   4. Anemia- chronic, not tolerating the iron BID, will continue one daily   5. Hypothyroidism- feeling better, TSH much improved

## 2024-12-09 ENCOUNTER — TELEPHONE (OUTPATIENT)
Dept: INTERNAL MEDICINE | Facility: CLINIC | Age: 64
End: 2024-12-09

## 2024-12-09 DIAGNOSIS — U07.1 COVID-19: Primary | ICD-10-CM

## 2024-12-09 NOTE — TELEPHONE ENCOUNTER
Caller: LYNNE WALKER    Relationship: Spouse    Best call back number: [phone]    What medication are you requesting: TREAT COVID    What are your current symptoms: COVID POSITIVE    How long have you been experiencing symptoms: COUPLE OF DAYS AGO    Have you had these symptoms before:    [] Yes  [x] No    Have you been treated for these symptoms before:   [] Yes  [x] No    If a prescription is needed, what is your preferred pharmacy and phone number: [facility] #[id number] - [facility] [address] [facility] [address] AT [place] [address] & [place] [address] - [phone]  - [phone] FX     Additional notes: STATED THAT THE PATIENT HAD TESTED POSITIVE FOR COVID THE DAY BEFORE YESTERDAY. STATED THAT THEY HAD IT BEFORE AND THAT DERECK NORMAN HAD INFORMED THEM TO CALL RIGHT AWAY IF SHE DEVELOPED IT. PLEASE CALL AND ADVISE

## 2024-12-09 NOTE — TELEPHONE ENCOUNTER
PATIENT'S SPOUSE  IS CALLING ON THE STATUS OF THIS. PATIENT'S SPOUSE STATES JENNA HAS NOT RECEIVED ANYTHING YET.

## 2024-12-09 NOTE — TELEPHONE ENCOUNTER
Called pt she said she wasn't that bad and she doesn't want rebound sx and will ride it out and let us know if something changes

## 2024-12-23 DIAGNOSIS — E03.8 OTHER SPECIFIED HYPOTHYROIDISM: ICD-10-CM

## 2024-12-23 RX ORDER — LEVOTHYROXINE SODIUM 25 UG/1
25 TABLET ORAL
Qty: 90 TABLET | Refills: 1 | Status: SHIPPED | OUTPATIENT
Start: 2024-12-23

## 2024-12-23 NOTE — TELEPHONE ENCOUNTER
Caller: LYNNE DIAZPETTY    Relationship: Spouse    Best call back number: [phone]    Requested Prescriptions:   Requested Prescriptions     Pending Prescriptions Disp Refills    levothyroxine (SYNTHROID, LEVOTHROID) 25 MCG tablet 90 tablet 1     Sig: Take 1 tablet by mouth Every Morning.        Pharmacy where request should be sent: [facility] #[id number] [facility] [address] [address] AT [place] [address] & [place] [address] - [phone] [facility] [phone] FX     Last office visit with prescribing clinician: 12/2/2024   Last telemedicine visit with prescribing clinician: Visit date not found   Next office visit with prescribing clinician: 6/11/2025     Additional details provided by patient: NEEDS FILLED HAS 3-4 DAYS    Does the patient have less than a 3 day supply:  [] Yes  [x] No    Would you like a call back once the refill request has been completed: [] Yes [x] No    If the office needs to give you a call back, can they leave a voicemail: [] Yes [x] No    Mary Hoover   12/23/24 08:39 EST

## 2025-01-09 ENCOUNTER — TELEPHONE (OUTPATIENT)
Dept: INTERNAL MEDICINE | Facility: CLINIC | Age: 65
End: 2025-01-09
Payer: MEDICARE

## 2025-01-09 DIAGNOSIS — E11.9 TYPE 2 DIABETES MELLITUS WITHOUT COMPLICATION, WITHOUT LONG-TERM CURRENT USE OF INSULIN: Primary | ICD-10-CM

## 2025-01-09 NOTE — TELEPHONE ENCOUNTER
Caller: LYNNE     Relationship:     Best call back number: [phone]     What medication are you requesting:      What are your current symptoms:      How long have you been experiencing symptoms:      Have you had these symptoms before:    [] Yes  [] No    Have you been treated for these symptoms before:   [] Yes  [] No    If a prescription is needed, what is your preferred pharmacy and phone number: [facility] #[id number] - [facility] [address] [address] AT [place] [address] & [place] [address] - [phone] [facility] [phone] FX     Additional notes: PATIENT  LYNNE CALLED AND PATIENT NEEDS TO NIC CAMPOS TO ORDER THE ONE TOUCH ULTRA TEST STRIPS 100 PER BOX SEND IN NEW PRESCRIPTION TO THE PHARMACY.  CAN PLEASE ORDER 2 TO 3 BOX AT ONE TIME.

## 2025-02-20 DIAGNOSIS — J45.21 MILD INTERMITTENT ASTHMA WITH ACUTE EXACERBATION: ICD-10-CM

## 2025-02-20 RX ORDER — FERROUS SULFATE 325(65) MG
1 TABLET ORAL
Qty: 90 TABLET | Refills: 1 | Status: SHIPPED | OUTPATIENT
Start: 2025-02-20

## 2025-02-20 RX ORDER — MONTELUKAST SODIUM 10 MG/1
10 TABLET ORAL NIGHTLY
Qty: 90 TABLET | Refills: 1 | Status: SHIPPED | OUTPATIENT
Start: 2025-02-20

## 2025-05-12 RX ORDER — LISINOPRIL 20 MG/1
20 TABLET ORAL DAILY
Qty: 90 TABLET | Refills: 1 | Status: SHIPPED | OUTPATIENT
Start: 2025-05-12

## 2025-05-27 DIAGNOSIS — E03.8 OTHER SPECIFIED HYPOTHYROIDISM: ICD-10-CM

## 2025-05-27 RX ORDER — LEVOTHYROXINE SODIUM 25 UG/1
25 TABLET ORAL
Qty: 90 TABLET | Refills: 1 | Status: SHIPPED | OUTPATIENT
Start: 2025-05-27

## 2025-05-27 NOTE — TELEPHONE ENCOUNTER
Caller: LYNNE Hearn    Relationship: Significant Other    Best call back number:     [phone]       Requested Prescriptions:   Requested Prescriptions     Pending Prescriptions Disp Refills    levothyroxine (SYNTHROID, LEVOTHROID) 25 MCG tablet 90 tablet 1     Sig: Take 1 tablet by mouth Every Morning.        Pharmacy where request should be sent: [facility] #[id number] - [facility] [address] [address] AT [place] [address] & [place] [address] - [phone] [facility] [phone] FX     Last office visit with prescribing clinician: 12/2/2024   Last telemedicine visit with prescribing clinician: Visit date not found   Next office visit with prescribing clinician: 6/11/2025     Additional details provided by patient:     Does the patient have less than a 3 day supply:  [x] Yes  [] No    Would you like a call back once the refill request has been completed: [] Yes [] No    If the office needs to give you a call back, can they leave a voicemail: [] Yes [] No    Boris Feliz Rep   05/27/25 11:44 EDT

## 2025-06-03 DIAGNOSIS — E11.9 TYPE 2 DIABETES MELLITUS WITHOUT COMPLICATION, WITHOUT LONG-TERM CURRENT USE OF INSULIN: Primary | ICD-10-CM

## 2025-06-03 DIAGNOSIS — I10 HYPERTENSION, UNSPECIFIED TYPE: ICD-10-CM

## 2025-06-03 DIAGNOSIS — E78.5 HYPERLIPIDEMIA, UNSPECIFIED HYPERLIPIDEMIA TYPE: ICD-10-CM

## 2025-06-03 DIAGNOSIS — D64.9 ANEMIA, UNSPECIFIED TYPE: ICD-10-CM

## 2025-06-05 LAB
ALBUMIN SERPL-MCNC: 4.6 G/DL (ref 3.5–5.2)
ALBUMIN/GLOB SERPL: 2.2 G/DL
ALP SERPL-CCNC: 56 U/L (ref 39–117)
ALT SERPL-CCNC: 16 U/L (ref 1–33)
AST SERPL-CCNC: 21 U/L (ref 1–32)
BASOPHILS # BLD AUTO: 0.08 10*3/MM3 (ref 0–0.2)
BASOPHILS NFR BLD AUTO: 1.4 % (ref 0–1.5)
BILIRUB SERPL-MCNC: 0.5 MG/DL (ref 0–1.2)
BUN SERPL-MCNC: 24 MG/DL (ref 8–23)
BUN/CREAT SERPL: 26.7 (ref 7–25)
CALCIUM SERPL-MCNC: 9.3 MG/DL (ref 8.6–10.5)
CHLORIDE SERPL-SCNC: 102 MMOL/L (ref 98–107)
CHOLEST SERPL-MCNC: 182 MG/DL (ref 0–200)
CHOLEST/HDLC SERPL: 2.76 {RATIO}
CO2 SERPL-SCNC: 24.9 MMOL/L (ref 22–29)
CREAT SERPL-MCNC: 0.9 MG/DL (ref 0.57–1)
EGFRCR SERPLBLD CKD-EPI 2021: 70.7 ML/MIN/1.73
EOSINOPHIL # BLD AUTO: 0.66 10*3/MM3 (ref 0–0.4)
EOSINOPHIL NFR BLD AUTO: 11.9 % (ref 0.3–6.2)
ERYTHROCYTE [DISTWIDTH] IN BLOOD BY AUTOMATED COUNT: 11.9 % (ref 12.3–15.4)
GLOBULIN SER CALC-MCNC: 2.1 GM/DL
GLUCOSE SERPL-MCNC: 126 MG/DL (ref 65–99)
HBA1C MFR BLD: 6.9 % (ref 4.8–5.6)
HCT VFR BLD AUTO: 34 % (ref 34–46.6)
HDLC SERPL-MCNC: 66 MG/DL (ref 40–60)
HGB BLD-MCNC: 11.5 G/DL (ref 12–15.9)
IMM GRANULOCYTES # BLD AUTO: 0.01 10*3/MM3 (ref 0–0.05)
IMM GRANULOCYTES NFR BLD AUTO: 0.2 % (ref 0–0.5)
IRON SATN MFR SERPL: 20 % (ref 20–50)
IRON SERPL-MCNC: 71 MCG/DL (ref 37–145)
LDLC SERPL CALC-MCNC: 93 MG/DL (ref 0–100)
LYMPHOCYTES # BLD AUTO: 1.76 10*3/MM3 (ref 0.7–3.1)
LYMPHOCYTES NFR BLD AUTO: 31.7 % (ref 19.6–45.3)
MCH RBC QN AUTO: 32.1 PG (ref 26.6–33)
MCHC RBC AUTO-ENTMCNC: 33.8 G/DL (ref 31.5–35.7)
MCV RBC AUTO: 95 FL (ref 79–97)
MONOCYTES # BLD AUTO: 0.47 10*3/MM3 (ref 0.1–0.9)
MONOCYTES NFR BLD AUTO: 8.5 % (ref 5–12)
NEUTROPHILS # BLD AUTO: 2.58 10*3/MM3 (ref 1.7–7)
NEUTROPHILS NFR BLD AUTO: 46.3 % (ref 42.7–76)
NRBC BLD AUTO-RTO: 0 /100 WBC (ref 0–0.2)
PLATELET # BLD AUTO: 318 10*3/MM3 (ref 140–450)
POTASSIUM SERPL-SCNC: 4.8 MMOL/L (ref 3.5–5.2)
PROT SERPL-MCNC: 6.7 G/DL (ref 6–8.5)
RBC # BLD AUTO: 3.58 10*6/MM3 (ref 3.77–5.28)
SODIUM SERPL-SCNC: 137 MMOL/L (ref 136–145)
TIBC SERPL-MCNC: 352 MCG/DL
TRIGL SERPL-MCNC: 135 MG/DL (ref 0–150)
TSH SERPL DL<=0.005 MIU/L-ACNC: 3.27 UIU/ML (ref 0.27–4.2)
UIBC SERPL-MCNC: 281 MCG/DL (ref 112–346)
VLDLC SERPL CALC-MCNC: 23 MG/DL (ref 5–40)
WBC # BLD AUTO: 5.56 10*3/MM3 (ref 3.4–10.8)

## 2025-06-06 LAB
FERRITIN SERPL-MCNC: 101 NG/ML (ref 13–150)
WRITTEN AUTHORIZATION: NORMAL

## 2025-06-11 ENCOUNTER — OFFICE VISIT (OUTPATIENT)
Dept: INTERNAL MEDICINE | Facility: CLINIC | Age: 65
End: 2025-06-11
Payer: MEDICARE

## 2025-06-11 VITALS
SYSTOLIC BLOOD PRESSURE: 148 MMHG | BODY MASS INDEX: 26.5 KG/M2 | WEIGHT: 135 LBS | OXYGEN SATURATION: 98 % | DIASTOLIC BLOOD PRESSURE: 82 MMHG | HEIGHT: 60 IN | HEART RATE: 65 BPM

## 2025-06-11 DIAGNOSIS — H91.90 HEARING LOSS, UNSPECIFIED HEARING LOSS TYPE, UNSPECIFIED LATERALITY: ICD-10-CM

## 2025-06-11 DIAGNOSIS — E78.5 HYPERLIPIDEMIA, UNSPECIFIED HYPERLIPIDEMIA TYPE: ICD-10-CM

## 2025-06-11 DIAGNOSIS — E03.8 OTHER SPECIFIED HYPOTHYROIDISM: ICD-10-CM

## 2025-06-11 DIAGNOSIS — Z78.0 POSTMENOPAUSE: ICD-10-CM

## 2025-06-11 DIAGNOSIS — Z00.00 MEDICARE ANNUAL WELLNESS VISIT, SUBSEQUENT: Primary | ICD-10-CM

## 2025-06-11 DIAGNOSIS — I10 HYPERTENSION, UNSPECIFIED TYPE: ICD-10-CM

## 2025-06-11 DIAGNOSIS — Z12.31 ENCOUNTER FOR SCREENING MAMMOGRAM FOR MALIGNANT NEOPLASM OF BREAST: ICD-10-CM

## 2025-06-11 DIAGNOSIS — E11.9 TYPE 2 DIABETES MELLITUS WITHOUT COMPLICATION, WITHOUT LONG-TERM CURRENT USE OF INSULIN: ICD-10-CM

## 2025-06-11 RX ORDER — ALBUTEROL SULFATE 90 UG/1
2 INHALANT RESPIRATORY (INHALATION)
Qty: 18 G | Refills: 3 | Status: SHIPPED | OUTPATIENT
Start: 2025-06-11

## 2025-06-11 RX ORDER — LISINOPRIL 40 MG/1
40 TABLET ORAL DAILY
Qty: 90 TABLET | Refills: 1 | Status: SHIPPED | OUTPATIENT
Start: 2025-06-11

## 2025-06-11 NOTE — ASSESSMENT & PLAN NOTE
Hypertension is uncontrolled  Medication changes per orders.  Blood pressure will be reassessedat the next regular appointment.    Orders:    lisinopril (PRINIVIL,ZESTRIL) 40 MG tablet; Take 1 tablet by mouth Daily.    Iron Profile w/o Ferritin; Future    Ferritin; Future    CBC & Differential; Future    Comprehensive Metabolic Panel; Future    Hemoglobin A1c; Future    TSH Rfx On Abnormal To Free T4; Future    Vitamin B12 & Folate; Future    Microalbumin / Creatinine Urine Ratio - Urine, Clean Catch; Future

## 2025-06-11 NOTE — PROGRESS NOTES
Subjective   The ABCs of the Annual Wellness Visit  Medicare Wellness Visit      Connor Hearn is a 64 y.o. patient who presents for a Medicare Wellness Visit.    The following portions of the patient's history were reviewed and   updated as appropriate: allergies, current medications, past family history, past medical history, past social history, past surgical history, and problem list.    Compared to one year ago, the patient's physical   health is the same.  Compared to one year ago, the patient's mental   health is the same.    Recent Hospitalizations:  She was not admitted to the hospital during the last year.     Current Medical Providers:  Patient Care Team:  Elizabeth Rutledge APRN as PCP - General (Family Medicine)    Outpatient Medications Prior to Visit   Medication Sig Dispense Refill    Calcium Carb-Cholecalciferol 600-5 MG-MCG tablet Take  by mouth.      Cinnamon 500 MG tablet Take 1,000 mg by mouth.      FeroSul 325 (65 Fe) MG tablet TAKE 1 TABLET BY MOUTH DAILY WITH BREAKFAST 90 tablet 1    glucose blood test strip 3 times daily 200 each 1    ibuprofen (ADVIL,MOTRIN) 600 MG tablet Take 1 tablet by mouth As Needed.      levothyroxine (SYNTHROID, LEVOTHROID) 25 MCG tablet Take 1 tablet by mouth Every Morning. 90 tablet 1    loratadine (Claritin) 10 MG tablet Take 1 tablet by mouth Daily.      metFORMIN (GLUCOPHAGE) 1000 MG tablet TAKE 1 TABLET BY MOUTH TWICE DAILY WITH MEALS 180 tablet 1    montelukast (SINGULAIR) 10 MG tablet TAKE 1 TABLET BY MOUTH EVERY NIGHT 90 tablet 1    multivitamin with minerals tablet tablet Take 1 tablet by mouth Daily.      omeprazole (priLOSEC) 20 MG capsule TAKE 1 CAPSULE BY MOUTH TWICE DAILY 180 capsule 1    vitamin C (ASCORBIC ACID) 500 MG tablet Take 1 tablet by mouth.      albuterol sulfate  (90 Base) MCG/ACT inhaler Inhale 2 puffs.      lisinopril (PRINIVIL,ZESTRIL) 20 MG tablet TAKE 1 TABLET BY MOUTH DAILY 90 tablet 1    Nirmatrelvir & Ritonavir, 300mg/100mg,  "(PAXLOVID) Take 3 tablets by mouth 2 (Two) Times a Day. Indications: COVID-19 Confirmed Infection (Patient not taking: Reported on 6/11/2025) 30 tablet 0     No facility-administered medications prior to visit.     No opioid medication identified on active medication list. I have reviewed chart for other potential  high risk medication/s and harmful drug interactions in the elderly.      Aspirin is not on active medication list.  Aspirin use is not indicated based on review of current medical condition/s. Risk of harm outweighs potential benefits.  .    Patient Active Problem List   Diagnosis    Asthma    Diabetes mellitus    Hypertension    Iron deficiency anemia    Hyperlipidemia    Insomnia    Gastroesophageal reflux disease with esophagitis without hemorrhage    Common bile duct dilation    Sleep apnea in adult    Other specified hypothyroidism    Osteopenia    Hepatic steatosis    Other specified glaucoma     Advance Care Planning Advance Directive is not on file.  ACP discussion was held with the patient during this visit. Patient has an advance directive (not in EMR), copy requested.            Objective   Vitals:    06/11/25 1026   BP: 148/82   Pulse: 65   SpO2: 98%   Weight: 61.2 kg (135 lb)   Height: 152.4 cm (60\")   PainSc: 5        Estimated body mass index is 26.37 kg/m² as calculated from the following:    Height as of this encounter: 152.4 cm (60\").    Weight as of this encounter: 61.2 kg (135 lb).    BMI is >= 25 and <30. (Overweight) The following options were offered after discussion;: exercise counseling/recommendations and nutrition counseling/recommendations           Does the patient have evidence of cognitive impairment? No  Lab Results   Component Value Date    CHLPL 182 06/04/2025    TRIG 135 06/04/2025    HDL 66 (H) 06/04/2025    LDL 93 06/04/2025    VLDL 23 06/04/2025    HGBA1C 6.90 (H) 06/04/2025                                                                                              "   Health  Risk Assessment    Smoking Status:  Social History     Tobacco Use   Smoking Status Never   Smokeless Tobacco Never     Alcohol Consumption:  Social History     Substance and Sexual Activity   Alcohol Use Never       Fall Risk Screen  STEADI Fall Risk Assessment was completed, and patient is at LOW risk for falls.Assessment completed on:2025    Depression Screening   Little interest or pleasure in doing things? Not at all   Feeling down, depressed, or hopeless? Not at all   PHQ-2 Total Score 0      Health Habits and Functional and Cognitive Screenin/11/2025    10:27 AM   Functional & Cognitive Status   Do you have difficulty preparing food and eating? No   Do you have difficulty bathing yourself, getting dressed or grooming yourself? No   Do you have difficulty using the toilet? No   Do you have difficulty moving around from place to place? No   Do you have trouble with steps or getting out of a bed or a chair? No   Current Diet Well Balanced Diet   Dental Exam Up to date   Eye Exam Up to date   Exercise (times per week) 3 times per week   Current Exercises Include Yard Work   Do you need help using the phone?  No   Are you deaf or do you have serious difficulty hearing?  No   Do you need help to go to places out of walking distance? No   Do you need help shopping? No   Do you need help preparing meals?  No   Do you need help with housework?  No   Do you need help with laundry? No   Do you need help taking your medications? No   Do you need help managing money? No   Do you ever drive or ride in a car without wearing a seat belt? No   Have you felt unusual stress, anger or loneliness in the last month? No   Who do you live with? Spouse   If you need help, do you have trouble finding someone available to you? No   Have you been bothered in the last four weeks by sexual problems? No   Do you have difficulty concentrating, remembering or making decisions? No           Age-appropriate Screening  Schedule:  Refer to the list below for future screening recommendations based on patient's age, sex and/or medical conditions. Orders for these recommended tests are listed in the plan section. The patient has been provided with a written plan.    Health Maintenance List  Health Maintenance   Topic Date Due    DIABETIC EYE EXAM  Never done    ZOSTER VACCINE (1 of 2) Never done    URINE MICROALBUMIN-CREATININE RATIO (uACR)  12/13/2023    COVID-19 Vaccine (3 - 2024-25 season) 09/01/2024    INFLUENZA VACCINE  07/01/2025    HEMOGLOBIN A1C  12/04/2025    MAMMOGRAM  12/21/2025    DXA SCAN  12/21/2025    LIPID PANEL  06/04/2026    ANNUAL WELLNESS VISIT  06/11/2026    DIABETIC FOOT EXAM  06/11/2026    COLORECTAL CANCER SCREENING  12/17/2027    TDAP/TD VACCINES (4 - Td or Tdap) 10/26/2028    HEPATITIS C SCREENING  Completed    Pneumococcal Vaccine 50+  Completed                                                                                                                                                CMS Preventative Services Quick Reference  Risk Factors Identified During Encounter  Hearing Problem: Referral to Audiologist ordered  Immunizations Discussed/Encouraged: Shingrix and COVID19  Vision Screening Recommended    The above risks/problems have been discussed with the patient.  Pertinent information has been shared with the patient in the After Visit Summary.  An After Visit Summary and PPPS were made available to the patient.    Follow Up:   Next Medicare Wellness visit to be scheduled in 1 year.         Additional E&M Note during same encounter follows:  Patient has additional, significant, and separately identifiable condition(s)/problem(s) that require work above and beyond the Medicare Wellness Visit     Chief Complaint  Medicare Wellness-subsequent (Labs done )    Subjective   HPI  DM2-Pt is doing well with current medication regimen, denies adverse reactions, compliant with medication schedule.   HTN-Pt is  "doing well with current medication regimen, denies adverse reactions, compliant with medication schedule.   Hypothyroidism-Pt is doing well with current medication regimen, denies adverse reactions, compliant with medication schedule.     Iron def - taking 1 tab daily    BP at home 140/68   Felix is also being seen today for additional medical problem/s.    Review of Systems   Constitutional: Negative.    Respiratory: Negative.     Cardiovascular: Negative.    Psychiatric/Behavioral: Negative.                Objective   Vital Signs:  /82   Pulse 65   Ht 152.4 cm (60\")   Wt 61.2 kg (135 lb)   SpO2 98%   BMI 26.37 kg/m²   Physical Exam  Constitutional:       Appearance: Normal appearance. She is well-developed.   Neck:      Thyroid: No thyromegaly.   Cardiovascular:      Rate and Rhythm: Normal rate and regular rhythm.      Pulses:           Dorsalis pedis pulses are 2+ on the right side and 2+ on the left side.        Posterior tibial pulses are 2+ on the right side and 2+ on the left side.      Heart sounds: Normal heart sounds.   Pulmonary:      Effort: Pulmonary effort is normal.      Breath sounds: Normal breath sounds.   Musculoskeletal:      Cervical back: Normal range of motion and neck supple.      Right foot: Normal range of motion.      Left foot: Normal range of motion.   Feet:      Right foot:      Protective Sensation: 10 sites tested.  10 sites sensed.      Skin integrity: Dry skin present.      Toenail Condition: Right toenails are normal.      Left foot:      Protective Sensation: 10 sites tested.  10 sites sensed.      Skin integrity: Dry skin present.      Toenail Condition: Left toenails are normal.   Lymphadenopathy:      Cervical: No cervical adenopathy.   Skin:     General: Skin is warm and dry.   Neurological:      Mental Status: She is alert.   Psychiatric:         Behavior: Behavior normal.         Thought Content: Thought content normal.         Judgment: Judgment normal. "         The following data was reviewed by: NIC Garcia on 06/11/2025:  Data reviewed : labs  Common labs          11/14/2024    16:07 11/25/2024    09:33 6/4/2025    08:01   Common Labs   Glucose  114  126    BUN  24  24.0    Creatinine  0.96  0.90    Sodium  141  137    Potassium  4.5  4.8    Chloride  103  102    Calcium  9.2  9.3    Albumin  4.3  4.6    Total Bilirubin  0.5  0.5    Alkaline Phosphatase  52  56    AST (SGOT)  19  21    ALT (SGPT)  14  16    WBC 8.64  7.54  5.56    Hemoglobin 10.8  11.1  11.5    Hematocrit 34.2  33.7  34.0    Platelets 297  331  318    Total Cholesterol  179  182    Triglycerides  62  135    HDL Cholesterol  71  66    LDL Cholesterol   96  93    Hemoglobin A1C  6.90  6.90    Uric Acid 4.8             Assessment and Plan Additional age appropriate preventative wellness advice topics were discussed during today's preventative wellness exam(some topics already addressed during AWV portion of the note above):    Physical Activity: Advised cardiovascular activity 150 minutes per week as tolerated. (example brisk walk for 30 minutes, 5 days a week).     Hypertension, unspecified type  Hypertension is uncontrolled  Medication changes per orders.  Blood pressure will be reassessedat the next regular appointment.    Orders:    lisinopril (PRINIVIL,ZESTRIL) 40 MG tablet; Take 1 tablet by mouth Daily.    Iron Profile w/o Ferritin; Future    Ferritin; Future    CBC & Differential; Future    Comprehensive Metabolic Panel; Future    Hemoglobin A1c; Future    TSH Rfx On Abnormal To Free T4; Future    Vitamin B12 & Folate; Future    Microalbumin / Creatinine Urine Ratio - Urine, Clean Catch; Future    Hearing loss, unspecified hearing loss type, unspecified laterality    Orders:    Ambulatory Referral to Audiology    Type 2 diabetes mellitus without complication, without long-term current use of insulin  Diabetes is stable.   Continue current treatment regimen.  Diabetes will be  reassessed in 6 months    Orders:    Iron Profile w/o Ferritin; Future    Ferritin; Future    CBC & Differential; Future    Comprehensive Metabolic Panel; Future    Hemoglobin A1c; Future    TSH Rfx On Abnormal To Free T4; Future    Vitamin B12 & Folate; Future    Microalbumin / Creatinine Urine Ratio - Urine, Clean Catch; Future    Hyperlipidemia, unspecified hyperlipidemia type   Lipid abnormalities are stable    Plan:  Continue same medication/s without change.      Discussed medication dosage, use, side effects, and goals of treatment in detail.    Counseled patient on lifestyle modifications to help control hyperlipidemia.     Patient Treatment Goals:   LDL goal is less than 70    Followup in 6 months.    Orders:    Iron Profile w/o Ferritin; Future    Ferritin; Future    CBC & Differential; Future    Comprehensive Metabolic Panel; Future    Hemoglobin A1c; Future    TSH Rfx On Abnormal To Free T4; Future    Vitamin B12 & Folate; Future    Microalbumin / Creatinine Urine Ratio - Urine, Clean Catch; Future    Other specified hypothyroidism    Orders:    Iron Profile w/o Ferritin; Future    Ferritin; Future    CBC & Differential; Future    Comprehensive Metabolic Panel; Future    Hemoglobin A1c; Future    TSH Rfx On Abnormal To Free T4; Future    Vitamin B12 & Folate; Future    Microalbumin / Creatinine Urine Ratio - Urine, Clean Catch; Future    Medicare annual wellness visit, subsequent         Postmenopause    Orders:    DEXA Bone Density Axial; Future    Encounter for screening mammogram for malignant neoplasm of breast    Orders:    Mammo Screening Digital Tomosynthesis Bilateral With CAD; Future     1. DM2- well controlled, check B12 with next lab draw   2. HTN- not to goal, increase lisinopril to 40 mg daily, goal BP <130/80, call for syst <110  3. Iron def- ok to try 1 tab QOD    4. Osteoarthritis- discussed voltaren gel, pt would prefer to use compound pain cream (sister does well with this)        I  spent 22 minutes caring for [place] on this date of service. This time includes time spent by me in the following activities:preparing for the visit, reviewing tests, performing a medically appropriate examination and/or evaluation , counseling and educating the patient/family/caregiver, documenting information in the medical record, and independently interpreting results and communicating that information with the patient/family/caregiver  Follow Up   Return in about 6 months (around 12/11/2025).  Patient was given instructions and counseling regarding her condition or for health maintenance advice. Please see specific information pulled into the AVS if appropriate.

## 2025-06-11 NOTE — ASSESSMENT & PLAN NOTE
Lipid abnormalities are stable    Plan:  Continue same medication/s without change.      Discussed medication dosage, use, side effects, and goals of treatment in detail.    Counseled patient on lifestyle modifications to help control hyperlipidemia.     Patient Treatment Goals:   LDL goal is less than 70    Followup in 6 months.    Orders:    Iron Profile w/o Ferritin; Future    Ferritin; Future    CBC & Differential; Future    Comprehensive Metabolic Panel; Future    Hemoglobin A1c; Future    TSH Rfx On Abnormal To Free T4; Future    Vitamin B12 & Folate; Future    Microalbumin / Creatinine Urine Ratio - Urine, Clean Catch; Future

## 2025-06-11 NOTE — ASSESSMENT & PLAN NOTE
Diabetes is stable.   Continue current treatment regimen.  Diabetes will be reassessed in 6 months    Orders:    Iron Profile w/o Ferritin; Future    Ferritin; Future    CBC & Differential; Future    Comprehensive Metabolic Panel; Future    Hemoglobin A1c; Future    TSH Rfx On Abnormal To Free T4; Future    Vitamin B12 & Folate; Future    Microalbumin / Creatinine Urine Ratio - Urine, Clean Catch; Future

## 2025-06-11 NOTE — ASSESSMENT & PLAN NOTE
Orders:    Iron Profile w/o Ferritin; Future    Ferritin; Future    CBC & Differential; Future    Comprehensive Metabolic Panel; Future    Hemoglobin A1c; Future    TSH Rfx On Abnormal To Free T4; Future    Vitamin B12 & Folate; Future    Microalbumin / Creatinine Urine Ratio - Urine, Clean Catch; Future

## 2025-06-11 NOTE — PATIENT INSTRUCTIONS
Medicare Wellness  Personal Prevention Plan of Service     Date of Office Visit:    Encounter Provider:  NIC Garcia  Place of Service:  [facility]  Patient Name: Connor Hearn  :  1960    As part of the Medicare Wellness portion of your visit today, we are providing you with this personalized preventive plan of services (PPPS). This plan is based upon recommendations of the [place] Preventive Services Task Force (USPSTF) and the Advisory Committee on Immunization Practices (ACIP).    This lists the preventive care services that should be considered, and provides dates of when you are due. Items listed as completed are up-to-date and do not require any further intervention.    Health Maintenance   Topic Date Due    DIABETIC EYE EXAM  Never done    ZOSTER VACCINE (1 of 2) Never done    URINE MICROALBUMIN-CREATININE RATIO (uACR)  2023    COVID-19 Vaccine (3 - 2024-25 season) 2024    ANNUAL WELLNESS VISIT  2025    INFLUENZA VACCINE  2025    HEMOGLOBIN A1C  2025    MAMMOGRAM  2025    DXA SCAN  2025    LIPID PANEL  2026    DIABETIC FOOT EXAM  2026    COLORECTAL CANCER SCREENING  2027    TDAP/TD VACCINES (4 - Td or Tdap) 10/26/2028    HEPATITIS C SCREENING  Completed    Pneumococcal Vaccine 50+  Completed       Orders Placed This Encounter   Procedures    Iron Profile w/o Ferritin     Standing Status:   Future     Expected Date:   2025     Expiration Date:   2026     Release to patient:   Routine Release [[id number]]    Ferritin     Standing Status:   Future     Expected Date:   2025     Expiration Date:   2026     Release to patient:   Routine Release [[id number]]    Comprehensive Metabolic Panel     Standing Status:   Future     Expected Date:   2025     Expiration Date:   2026     Release to patient:   Routine Release [[id number]]    Hemoglobin A1c     Standing  Status:   Future     Expected Date:   12/11/2025     Expiration Date:   6/11/2026     Release to patient:   Routine Release [[id number]]    TSH Rfx On Abnormal To Free T4     Standing Status:   Future     Expected Date:   12/11/2025     Expiration Date:   6/12/2026     Release to patient:   Routine Release [[id number]]    Vitamin B12 & Folate     Standing Status:   Future     Expected Date:   12/11/2025     Expiration Date:   6/12/2026     Release to patient:   Routine Release [[id number]]    Microalbumin / Creatinine Urine Ratio - Urine, Clean Catch     Standing Status:   Future     Expected Date:   12/11/2025     Expiration Date:   6/11/2026     Release to patient:   Routine Release [[id number]]    Ambulatory Referral to Audiology     Referral Priority:   Routine     Referral Type:   Consultation     Referral Reason:   Specialty Services Required     Referred to Provider:   Karyn Larsen Au.D     Requested Specialty:   Audiology     Number of Visits Requested:   1    CBC & Differential     Standing Status:   Future     Expected Date:   12/11/2025     Expiration Date:   6/12/2026     Manual Differential:   No     Release to patient:   Routine Release [[id number]]       No follow-ups on file.

## 2025-07-08 ENCOUNTER — HOSPITAL ENCOUNTER (OUTPATIENT)
Dept: MAMMOGRAPHY | Facility: HOSPITAL | Age: 65
Discharge: HOME | End: 2025-07-08
Admitting: NURSE PRACTITIONER
Payer: MEDICARE

## 2025-07-08 DIAGNOSIS — Z12.31 ENCOUNTER FOR SCREENING MAMMOGRAM FOR MALIGNANT NEOPLASM OF BREAST: ICD-10-CM

## 2025-07-08 PROCEDURE — 77063 BREAST TOMOSYNTHESIS BI: CPT

## 2025-07-08 PROCEDURE — 77067 SCR MAMMO BI INCL CAD: CPT

## 2025-07-10 DIAGNOSIS — R92.8 ABNORMAL MAMMOGRAM OF RIGHT BREAST: Primary | ICD-10-CM

## 2025-08-09 DIAGNOSIS — J45.21 MILD INTERMITTENT ASTHMA WITH ACUTE EXACERBATION: ICD-10-CM

## 2025-08-11 RX ORDER — OMEPRAZOLE 20 MG/1
20 CAPSULE, DELAYED RELEASE ORAL 2 TIMES DAILY
Qty: 180 CAPSULE | Refills: 1 | Status: SHIPPED | OUTPATIENT
Start: 2025-08-11

## 2025-08-11 RX ORDER — MONTELUKAST SODIUM 10 MG/1
10 TABLET ORAL NIGHTLY
Qty: 90 TABLET | Refills: 1 | Status: SHIPPED | OUTPATIENT
Start: 2025-08-11

## (undated) DIAGNOSIS — D64.9 ANEMIA, UNSPECIFIED TYPE: ICD-10-CM

## (undated) DIAGNOSIS — E11.9 TYPE 2 DIABETES MELLITUS WITHOUT COMPLICATION, WITHOUT LONG-TERM CURRENT USE OF INSULIN (HCC): Primary | ICD-10-CM

## (undated) DIAGNOSIS — E78.00 PURE HYPERCHOLESTEROLEMIA: ICD-10-CM

## (undated) NOTE — LETTER
12/27/18        Donald Garcia  [address]  [place] [address]      Dear Donald Alicia,    Our records indicate that you have outstanding lab work and or testing that was ordered for you and has not yet been completed:  Lab Frequency Next Occurrence   LANDEN

## (undated) NOTE — LETTER
[address]  [facility] [address]    1/24/2022      Dear  Cammie Polk    In order to provide the highest quality care, ZARA Colon uses a sophisticated computer system to track our patien

## (undated) NOTE — LETTER
[address] [facility] [address]    4/26/2018      Dear  Lalo Dominguez    In order to provide the highest quality care, ZARA Kohli uses a sophisticated computer system to track our patient's

## (undated) NOTE — LETTER
20          Heather Schulz [facility]  :  1960      To Whom It May Concern: This patient has been off work due to viral gastroenteritis . Work status:   May return to work full-time, no restrictions    May return to work status per above

## (undated) NOTE — LETTER
2/1/2018      Claudean Rick Derdzinski  [address] [place], [place] [id number]      Dear Lulu Bliss,        Here are your results from your recent visit with Gastroenterology.        You will be happy to know that the stool test for the bacteria called H.pylori retur

## (undated) NOTE — Clinical Note
04/14/2017        Ashely Garcia  [address] [facility]  [place] [address]      Dear Ashely Juárez,    Our records indicate that you have lab work and or testing that was ordered for you and has not yet been completed:  Lab Frequency Next Occurrence   HEMOGLOBIN A1

## (undated) NOTE — Clinical Note
Pt declined HFU appt at this time, she plans to CB to schedule. Pt reports she is doing well, no chest pain. Pt feels it is stress related from work and plans to monitor what she is doing or how she is feeling if it comes back. Medication list reviewed.  Tom Kendall

## (undated) NOTE — LETTER
[address]  [facility] [address]    1/24/2022      Dear  Nico Mendoza    In order to provide the highest quality care, ZARA Hadley uses a sophisticated computer system to track our patien

## (undated) NOTE — LETTER
Isauro Garcia  [address] [facility] [address]    1/7/2020      Dear  Lisa Monroe    In order to provide the highest quality care, ZARA Diego uses a sophisticated computer system to track our patien

## (undated) NOTE — MR AVS SNAPSHOT
[address] [facility] [id number] [phone]               Thank you for choosing us [facility].   We are glad to serve you and happy to provide you with this Commonly known as:  PRILOSEC           ONETOUCH DELICA LANCETS FINE Misc   USE 1 LANCET BY FINGER STICK THREE TIMES A DAY           ONETOUCH ULTRA BLUE Strp   Generic drug:  Glucose Blood   TEST BLOOD SUGAR THREE TIMES A DAY           Rosiglitazone Maleate

## (undated) NOTE — LETTER
Olegario Blunt  [address] [place] [id number]    3/2/2020      Dear  Olegario Blunt    In order to provide the highest quality care, ZARA Quintana uses a sophisticated computer system to track our patien